# Patient Record
Sex: FEMALE | Race: WHITE | Employment: UNEMPLOYED | URBAN - METROPOLITAN AREA
[De-identification: names, ages, dates, MRNs, and addresses within clinical notes are randomized per-mention and may not be internally consistent; named-entity substitution may affect disease eponyms.]

---

## 2022-05-28 ENCOUNTER — HOSPITAL ENCOUNTER (INPATIENT)
Age: 72
LOS: 2 days | Discharge: HOME | DRG: 064 | End: 2022-05-30
Attending: EMERGENCY MEDICINE | Admitting: PSYCHIATRY & NEUROLOGY
Payer: MEDICARE

## 2022-05-28 ENCOUNTER — APPOINTMENT (OUTPATIENT)
Dept: MRI IMAGING | Age: 72
DRG: 064 | End: 2022-05-28
Payer: MEDICARE

## 2022-05-28 ENCOUNTER — APPOINTMENT (OUTPATIENT)
Dept: CT IMAGING | Age: 72
DRG: 064 | End: 2022-05-28
Payer: MEDICARE

## 2022-05-28 ENCOUNTER — APPOINTMENT (OUTPATIENT)
Dept: GENERAL RADIOLOGY | Age: 72
DRG: 064 | End: 2022-05-28
Payer: MEDICARE

## 2022-05-28 DIAGNOSIS — E86.0 DEHYDRATION: Primary | ICD-10-CM

## 2022-05-28 DIAGNOSIS — N17.9 AKI (ACUTE KIDNEY INJURY) (HCC): ICD-10-CM

## 2022-05-28 DIAGNOSIS — E11.22 CKD STAGE 3 DUE TO TYPE 2 DIABETES MELLITUS (HCC): ICD-10-CM

## 2022-05-28 DIAGNOSIS — N18.30 CKD STAGE 3 DUE TO TYPE 2 DIABETES MELLITUS (HCC): ICD-10-CM

## 2022-05-28 DIAGNOSIS — I63.9 CEREBROVASCULAR ACCIDENT (CVA), UNSPECIFIED MECHANISM (HCC): ICD-10-CM

## 2022-05-28 DIAGNOSIS — E11.65 TYPE 2 DIABETES MELLITUS WITH HYPERGLYCEMIA, WITH LONG-TERM CURRENT USE OF INSULIN (HCC): ICD-10-CM

## 2022-05-28 DIAGNOSIS — Z79.4 TYPE 2 DIABETES MELLITUS WITH HYPERGLYCEMIA, WITH LONG-TERM CURRENT USE OF INSULIN (HCC): ICD-10-CM

## 2022-05-28 PROBLEM — E78.2 MIXED HYPERLIPIDEMIA: Status: ACTIVE | Noted: 2022-05-28

## 2022-05-28 PROBLEM — K52.9 CHRONIC DIARRHEA: Status: ACTIVE | Noted: 2022-05-28

## 2022-05-28 PROBLEM — I10 PRIMARY HYPERTENSION: Status: ACTIVE | Noted: 2022-05-28

## 2022-05-28 PROBLEM — N30.00 ACUTE CYSTITIS: Status: ACTIVE | Noted: 2022-05-28

## 2022-05-28 PROBLEM — E03.9 ACQUIRED HYPOTHYROIDISM: Status: ACTIVE | Noted: 2022-05-28

## 2022-05-28 PROBLEM — E03.8 OTHER SPECIFIED HYPOTHYROIDISM: Status: ACTIVE | Noted: 2022-05-28

## 2022-05-28 LAB
-: ABNORMAL
ABSOLUTE EOS #: 0.28 K/UL (ref 0–0.44)
ABSOLUTE IMMATURE GRANULOCYTE: 0.05 K/UL (ref 0–0.3)
ABSOLUTE LYMPH #: 3.15 K/UL (ref 1.1–3.7)
ABSOLUTE MONO #: 0.7 K/UL (ref 0.1–1.2)
ALBUMIN SERPL-MCNC: 3.9 G/DL (ref 3.5–5.2)
ALBUMIN/GLOBULIN RATIO: 1.2 (ref 1–2.5)
ALP BLD-CCNC: 69 U/L (ref 35–104)
ALT SERPL-CCNC: 45 U/L (ref 5–33)
ANION GAP SERPL CALCULATED.3IONS-SCNC: 16 MMOL/L (ref 9–17)
ANION GAP: 10 MMOL/L (ref 7–16)
AST SERPL-CCNC: 42 U/L
BACTERIA: ABNORMAL
BASOPHILS # BLD: 1 % (ref 0–2)
BASOPHILS ABSOLUTE: 0.13 K/UL (ref 0–0.2)
BETA-HYDROXYBUTYRATE: 0.29 MMOL/L (ref 0.02–0.27)
BILIRUB SERPL-MCNC: 0.38 MG/DL (ref 0.3–1.2)
BILIRUBIN URINE: NEGATIVE
BUN BLDV-MCNC: 23 MG/DL (ref 8–23)
CALCIUM SERPL-MCNC: 9.9 MG/DL (ref 8.6–10.4)
CASTS UA: ABNORMAL /LPF (ref 0–8)
CHLORIDE BLD-SCNC: 93 MMOL/L (ref 98–107)
CHOLESTEROL/HDL RATIO: 5.1
CHOLESTEROL: 256 MG/DL
CHP ED QC CHECK: NORMAL
CO2: 22 MMOL/L (ref 20–31)
COLOR: YELLOW
CREAT SERPL-MCNC: 1.32 MG/DL (ref 0.5–0.9)
CREATININE URINE: 76 MG/DL (ref 28–217)
EOSINOPHILS RELATIVE PERCENT: 3 % (ref 1–4)
EPITHELIAL CELLS UA: ABNORMAL /HPF (ref 0–5)
FLU A ANTIGEN: NEGATIVE
FLU B ANTIGEN: NEGATIVE
GFR AFRICAN AMERICAN: 48 ML/MIN
GFR NON-AFRICAN AMERICAN: 33 ML/MIN
GFR NON-AFRICAN AMERICAN: 40 ML/MIN
GFR SERPL CREATININE-BSD FRML MDRD: 40 ML/MIN
GFR SERPL CREATININE-BSD FRML MDRD: ABNORMAL ML/MIN/{1.73_M2}
GFR SERPL CREATININE-BSD FRML MDRD: ABNORMAL ML/MIN/{1.73_M2}
GLUCOSE BLD-MCNC: 323 MG/DL
GLUCOSE BLD-MCNC: 323 MG/DL (ref 65–105)
GLUCOSE BLD-MCNC: 338 MG/DL (ref 65–105)
GLUCOSE BLD-MCNC: 345 MG/DL (ref 70–99)
GLUCOSE BLD-MCNC: 350 MG/DL (ref 74–100)
GLUCOSE URINE: ABNORMAL
HCO3 VENOUS: 27.7 MMOL/L (ref 22–29)
HCT VFR BLD CALC: 44.4 % (ref 36.3–47.1)
HDLC SERPL-MCNC: 50 MG/DL
HEMOGLOBIN: 15.1 G/DL (ref 11.9–15.1)
IMMATURE GRANULOCYTES: 1 %
INR BLD: 1
KETONES, URINE: NEGATIVE
LACTIC ACID, WHOLE BLOOD: 1.6 MMOL/L (ref 0.7–2.1)
LACTIC ACID, WHOLE BLOOD: 2.2 MMOL/L (ref 0.7–2.1)
LDL CHOLESTEROL: 145 MG/DL (ref 0–130)
LEUKOCYTE ESTERASE, URINE: ABNORMAL
LYMPHOCYTES # BLD: 29 % (ref 24–43)
MCH RBC QN AUTO: 29.8 PG (ref 25.2–33.5)
MCHC RBC AUTO-ENTMCNC: 34 G/DL (ref 28.4–34.8)
MCV RBC AUTO: 87.6 FL (ref 82.6–102.9)
MONOCYTES # BLD: 6 % (ref 3–12)
MYOGLOBIN: 84 NG/ML (ref 25–58)
NITRITE, URINE: NEGATIVE
NRBC AUTOMATED: 0 PER 100 WBC
O2 SAT, VEN: 61 % (ref 60–85)
PARTIAL THROMBOPLASTIN TIME: 22.7 SEC (ref 20.5–30.5)
PCO2, VEN: 49 MM HG (ref 41–51)
PDW BLD-RTO: 13.3 % (ref 11.8–14.4)
PH UA: 5.5 (ref 5–8)
PH VENOUS: 7.36 (ref 7.32–7.43)
PLATELET # BLD: 188 K/UL (ref 138–453)
PMV BLD AUTO: 10.3 FL (ref 8.1–13.5)
PO2, VEN: 33.5 MM HG (ref 30–50)
POC BUN: 25 MG/DL (ref 8–26)
POC CHLORIDE: 98 MMOL/L (ref 98–107)
POC CREATININE: 1.56 MG/DL (ref 0.51–1.19)
POC HEMATOCRIT: 46 % (ref 36–46)
POC HEMOGLOBIN: 15.6 G/DL (ref 12–16)
POC IONIZED CALCIUM: 1.23 MMOL/L (ref 1.15–1.33)
POC LACTIC ACID: 1.88 MMOL/L (ref 0.56–1.39)
POC POTASSIUM: 4.9 MMOL/L (ref 3.5–4.5)
POC SODIUM: 135 MMOL/L (ref 138–146)
POC TCO2: 28 MMOL/L (ref 22–30)
POSITIVE BASE EXCESS, VEN: 1 (ref 0–3)
POTASSIUM SERPL-SCNC: 4.5 MMOL/L (ref 3.7–5.3)
PRO-BNP: 902 PG/ML
PROTEIN UA: ABNORMAL
PROTHROMBIN TIME: 10.4 SEC (ref 9.1–12.3)
RBC # BLD: 5.07 M/UL (ref 3.95–5.11)
RBC UA: ABNORMAL /HPF (ref 0–4)
SEG NEUTROPHILS: 60 % (ref 36–65)
SEGMENTED NEUTROPHILS ABSOLUTE COUNT: 6.55 K/UL (ref 1.5–8.1)
SODIUM BLD-SCNC: 131 MMOL/L (ref 135–144)
SPECIFIC GRAVITY UA: 1.05 (ref 1–1.03)
THYROXINE, FREE: 0.89 NG/DL (ref 0.93–1.7)
TOTAL CK: 63 U/L (ref 26–192)
TOTAL PROTEIN, URINE: 46 MG/DL
TOTAL PROTEIN: 7.2 G/DL (ref 6.4–8.3)
TRIGL SERPL-MCNC: 305 MG/DL
TROPONIN, HIGH SENSITIVITY: 19 NG/L (ref 0–14)
TROPONIN, HIGH SENSITIVITY: 22 NG/L (ref 0–14)
TSH SERPL DL<=0.05 MIU/L-ACNC: 14.46 UIU/ML (ref 0.3–5)
TURBIDITY: CLEAR
URINE HGB: NEGATIVE
URINE TOTAL PROTEIN CREATININE RATIO: 0.61 (ref 0–0.2)
UROBILINOGEN, URINE: NORMAL
WBC # BLD: 10.9 K/UL (ref 3.5–11.3)
WBC UA: ABNORMAL /HPF (ref 0–5)

## 2022-05-28 PROCEDURE — 87088 URINE BACTERIA CULTURE: CPT

## 2022-05-28 PROCEDURE — 81001 URINALYSIS AUTO W/SCOPE: CPT

## 2022-05-28 PROCEDURE — 93005 ELECTROCARDIOGRAM TRACING: CPT | Performed by: STUDENT IN AN ORGANIZED HEALTH CARE EDUCATION/TRAINING PROGRAM

## 2022-05-28 PROCEDURE — 70498 CT ANGIOGRAPHY NECK: CPT

## 2022-05-28 PROCEDURE — 6370000000 HC RX 637 (ALT 250 FOR IP): Performed by: STUDENT IN AN ORGANIZED HEALTH CARE EDUCATION/TRAINING PROGRAM

## 2022-05-28 PROCEDURE — 87186 SC STD MICRODIL/AGAR DIL: CPT

## 2022-05-28 PROCEDURE — 85014 HEMATOCRIT: CPT

## 2022-05-28 PROCEDURE — 84484 ASSAY OF TROPONIN QUANT: CPT

## 2022-05-28 PROCEDURE — 99223 1ST HOSP IP/OBS HIGH 75: CPT | Performed by: PSYCHIATRY & NEUROLOGY

## 2022-05-28 PROCEDURE — 6360000004 HC RX CONTRAST MEDICATION: Performed by: STUDENT IN AN ORGANIZED HEALTH CARE EDUCATION/TRAINING PROGRAM

## 2022-05-28 PROCEDURE — 70450 CT HEAD/BRAIN W/O DYE: CPT

## 2022-05-28 PROCEDURE — 82550 ASSAY OF CK (CPK): CPT

## 2022-05-28 PROCEDURE — 2580000003 HC RX 258: Performed by: STUDENT IN AN ORGANIZED HEALTH CARE EDUCATION/TRAINING PROGRAM

## 2022-05-28 PROCEDURE — 80048 BASIC METABOLIC PNL TOTAL CA: CPT

## 2022-05-28 PROCEDURE — 1200000000 HC SEMI PRIVATE

## 2022-05-28 PROCEDURE — 2580000003 HC RX 258: Performed by: FAMILY MEDICINE

## 2022-05-28 PROCEDURE — 83874 ASSAY OF MYOGLOBIN: CPT

## 2022-05-28 PROCEDURE — 71045 X-RAY EXAM CHEST 1 VIEW: CPT

## 2022-05-28 PROCEDURE — 84439 ASSAY OF FREE THYROXINE: CPT

## 2022-05-28 PROCEDURE — 82010 KETONE BODYS QUAN: CPT

## 2022-05-28 PROCEDURE — 87086 URINE CULTURE/COLONY COUNT: CPT

## 2022-05-28 PROCEDURE — 87635 SARS-COV-2 COVID-19 AMP PRB: CPT

## 2022-05-28 PROCEDURE — 83880 ASSAY OF NATRIURETIC PEPTIDE: CPT

## 2022-05-28 PROCEDURE — 85025 COMPLETE CBC W/AUTO DIFF WBC: CPT

## 2022-05-28 PROCEDURE — 6370000000 HC RX 637 (ALT 250 FOR IP): Performed by: FAMILY MEDICINE

## 2022-05-28 PROCEDURE — 85730 THROMBOPLASTIN TIME PARTIAL: CPT

## 2022-05-28 PROCEDURE — 84520 ASSAY OF UREA NITROGEN: CPT

## 2022-05-28 PROCEDURE — 84156 ASSAY OF PROTEIN URINE: CPT

## 2022-05-28 PROCEDURE — 70551 MRI BRAIN STEM W/O DYE: CPT

## 2022-05-28 PROCEDURE — 82570 ASSAY OF URINE CREATININE: CPT

## 2022-05-28 PROCEDURE — 83605 ASSAY OF LACTIC ACID: CPT

## 2022-05-28 PROCEDURE — 85610 PROTHROMBIN TIME: CPT

## 2022-05-28 PROCEDURE — 82553 CREATINE MB FRACTION: CPT

## 2022-05-28 PROCEDURE — 82803 BLOOD GASES ANY COMBINATION: CPT

## 2022-05-28 PROCEDURE — 99285 EMERGENCY DEPT VISIT HI MDM: CPT

## 2022-05-28 PROCEDURE — 84443 ASSAY THYROID STIM HORMONE: CPT

## 2022-05-28 PROCEDURE — 82947 ASSAY GLUCOSE BLOOD QUANT: CPT

## 2022-05-28 PROCEDURE — 80051 ELECTROLYTE PANEL: CPT

## 2022-05-28 PROCEDURE — 82330 ASSAY OF CALCIUM: CPT

## 2022-05-28 PROCEDURE — 6360000002 HC RX W HCPCS: Performed by: STUDENT IN AN ORGANIZED HEALTH CARE EDUCATION/TRAINING PROGRAM

## 2022-05-28 PROCEDURE — 80061 LIPID PANEL: CPT

## 2022-05-28 PROCEDURE — 82565 ASSAY OF CREATININE: CPT

## 2022-05-28 PROCEDURE — 80053 COMPREHEN METABOLIC PANEL: CPT

## 2022-05-28 PROCEDURE — 83036 HEMOGLOBIN GLYCOSYLATED A1C: CPT

## 2022-05-28 PROCEDURE — 87804 INFLUENZA ASSAY W/OPTIC: CPT

## 2022-05-28 RX ORDER — LEVOTHYROXINE SODIUM 0.12 MG/1
TABLET ORAL
COMMUNITY
Start: 2021-03-21

## 2022-05-28 RX ORDER — CYCLOBENZAPRINE HCL 10 MG
TABLET ORAL NIGHTLY PRN
Status: ON HOLD | COMMUNITY
Start: 2022-03-15 | End: 2022-05-30 | Stop reason: HOSPADM

## 2022-05-28 RX ORDER — GLIMEPIRIDE 4 MG/1
TABLET ORAL 2 TIMES DAILY
COMMUNITY
Start: 2022-05-28

## 2022-05-28 RX ORDER — ONDANSETRON 2 MG/ML
4 INJECTION INTRAMUSCULAR; INTRAVENOUS EVERY 6 HOURS PRN
Status: DISCONTINUED | OUTPATIENT
Start: 2022-05-28 | End: 2022-05-30 | Stop reason: HOSPADM

## 2022-05-28 RX ORDER — GABAPENTIN 300 MG/1
CAPSULE ORAL NIGHTLY
COMMUNITY
Start: 2022-04-27

## 2022-05-28 RX ORDER — SITAGLIPTIN 50 MG/1
TABLET, FILM COATED ORAL
COMMUNITY
Start: 2022-03-15

## 2022-05-28 RX ORDER — CLOPIDOGREL 300 MG/1
300 TABLET, FILM COATED ORAL ONCE
Status: COMPLETED | OUTPATIENT
Start: 2022-05-28 | End: 2022-05-28

## 2022-05-28 RX ORDER — CLOPIDOGREL BISULFATE 75 MG/1
75 TABLET ORAL DAILY
Status: DISCONTINUED | OUTPATIENT
Start: 2022-05-29 | End: 2022-05-30 | Stop reason: HOSPADM

## 2022-05-28 RX ORDER — DEXTROSE MONOHYDRATE 50 MG/ML
100 INJECTION, SOLUTION INTRAVENOUS PRN
Status: DISCONTINUED | OUTPATIENT
Start: 2022-05-28 | End: 2022-05-30 | Stop reason: HOSPADM

## 2022-05-28 RX ORDER — PANTOPRAZOLE SODIUM 40 MG/1
TABLET, DELAYED RELEASE ORAL
COMMUNITY
Start: 2022-03-12

## 2022-05-28 RX ORDER — INSULIN DEGLUDEC INJECTION 100 U/ML
INJECTION, SOLUTION SUBCUTANEOUS
Status: ON HOLD | COMMUNITY
Start: 2021-08-17 | End: 2022-05-30 | Stop reason: HOSPADM

## 2022-05-28 RX ORDER — INSULIN LISPRO 100 [IU]/ML
0-12 INJECTION, SOLUTION INTRAVENOUS; SUBCUTANEOUS
Status: DISCONTINUED | OUTPATIENT
Start: 2022-05-29 | End: 2022-05-30 | Stop reason: HOSPADM

## 2022-05-28 RX ORDER — 0.9 % SODIUM CHLORIDE 0.9 %
1000 INTRAVENOUS SOLUTION INTRAVENOUS ONCE
Status: COMPLETED | OUTPATIENT
Start: 2022-05-28 | End: 2022-05-28

## 2022-05-28 RX ORDER — LOPERAMIDE HYDROCHLORIDE 2 MG/1
2 CAPSULE ORAL 3 TIMES DAILY PRN
Status: DISCONTINUED | OUTPATIENT
Start: 2022-05-28 | End: 2022-05-30 | Stop reason: HOSPADM

## 2022-05-28 RX ORDER — PANTOPRAZOLE SODIUM 40 MG/1
40 TABLET, DELAYED RELEASE ORAL DAILY
Status: DISCONTINUED | OUTPATIENT
Start: 2022-05-29 | End: 2022-05-30 | Stop reason: HOSPADM

## 2022-05-28 RX ORDER — ROSUVASTATIN CALCIUM 20 MG/1
40 TABLET, COATED ORAL NIGHTLY
Status: DISCONTINUED | OUTPATIENT
Start: 2022-05-29 | End: 2022-05-30 | Stop reason: HOSPADM

## 2022-05-28 RX ORDER — INSULIN GLARGINE 100 [IU]/ML
10 INJECTION, SOLUTION SUBCUTANEOUS NIGHTLY
Status: DISCONTINUED | OUTPATIENT
Start: 2022-05-28 | End: 2022-05-29

## 2022-05-28 RX ORDER — SODIUM CHLORIDE 0.9 % (FLUSH) 0.9 %
10 SYRINGE (ML) INJECTION PRN
Status: DISCONTINUED | OUTPATIENT
Start: 2022-05-28 | End: 2022-05-30 | Stop reason: HOSPADM

## 2022-05-28 RX ORDER — ROSUVASTATIN CALCIUM 20 MG/1
40 TABLET, COATED ORAL ONCE
Status: COMPLETED | OUTPATIENT
Start: 2022-05-28 | End: 2022-05-28

## 2022-05-28 RX ORDER — INSULIN LISPRO 100 [IU]/ML
5 INJECTION, SOLUTION INTRAVENOUS; SUBCUTANEOUS ONCE
Status: COMPLETED | OUTPATIENT
Start: 2022-05-28 | End: 2022-05-28

## 2022-05-28 RX ORDER — ONDANSETRON 4 MG/1
4 TABLET, ORALLY DISINTEGRATING ORAL EVERY 8 HOURS PRN
Status: DISCONTINUED | OUTPATIENT
Start: 2022-05-28 | End: 2022-05-30 | Stop reason: HOSPADM

## 2022-05-28 RX ORDER — IBUPROFEN 800 MG/1
TABLET ORAL
Status: ON HOLD | COMMUNITY
Start: 2022-03-15 | End: 2022-05-30 | Stop reason: HOSPADM

## 2022-05-28 RX ORDER — ENOXAPARIN SODIUM 100 MG/ML
40 INJECTION SUBCUTANEOUS DAILY
Status: DISCONTINUED | OUTPATIENT
Start: 2022-05-29 | End: 2022-05-30 | Stop reason: HOSPADM

## 2022-05-28 RX ORDER — LEVOTHYROXINE SODIUM 0.12 MG/1
125 TABLET ORAL DAILY
Status: DISCONTINUED | OUTPATIENT
Start: 2022-05-29 | End: 2022-05-29

## 2022-05-28 RX ORDER — LOPERAMIDE HYDROCHLORIDE 2 MG/1
2 TABLET ORAL 2 TIMES DAILY PRN
COMMUNITY

## 2022-05-28 RX ORDER — INSULIN LISPRO 100 [IU]/ML
0-3 INJECTION, SOLUTION INTRAVENOUS; SUBCUTANEOUS NIGHTLY
Status: CANCELLED | OUTPATIENT
Start: 2022-05-28

## 2022-05-28 RX ORDER — INSULIN LISPRO 100 [IU]/ML
0-6 INJECTION, SOLUTION INTRAVENOUS; SUBCUTANEOUS
Status: CANCELLED | OUTPATIENT
Start: 2022-05-28

## 2022-05-28 RX ORDER — CARVEDILOL 25 MG/1
TABLET ORAL 2 TIMES DAILY WITH MEALS
COMMUNITY
Start: 2022-03-15

## 2022-05-28 RX ORDER — SERTRALINE HYDROCHLORIDE 100 MG/1
TABLET, FILM COATED ORAL
COMMUNITY
Start: 2022-03-15

## 2022-05-28 RX ORDER — ATORVASTATIN CALCIUM 40 MG/1
40 TABLET, FILM COATED ORAL NIGHTLY
Status: DISCONTINUED | OUTPATIENT
Start: 2022-05-28 | End: 2022-05-28

## 2022-05-28 RX ORDER — SODIUM CHLORIDE 9 MG/ML
INJECTION, SOLUTION INTRAVENOUS PRN
Status: DISCONTINUED | OUTPATIENT
Start: 2022-05-28 | End: 2022-05-30 | Stop reason: HOSPADM

## 2022-05-28 RX ORDER — LISINOPRIL 10 MG/1
TABLET ORAL
Status: ON HOLD | COMMUNITY
Start: 2022-05-18 | End: 2022-05-30 | Stop reason: HOSPADM

## 2022-05-28 RX ORDER — INSULIN LISPRO 100 [IU]/ML
0-6 INJECTION, SOLUTION INTRAVENOUS; SUBCUTANEOUS NIGHTLY
Status: DISCONTINUED | OUTPATIENT
Start: 2022-05-28 | End: 2022-05-30 | Stop reason: HOSPADM

## 2022-05-28 RX ORDER — SODIUM CHLORIDE 9 MG/ML
INJECTION, SOLUTION INTRAVENOUS CONTINUOUS
Status: DISCONTINUED | OUTPATIENT
Start: 2022-05-28 | End: 2022-05-30

## 2022-05-28 RX ORDER — SODIUM CHLORIDE 0.9 % (FLUSH) 0.9 %
10 SYRINGE (ML) INJECTION EVERY 12 HOURS SCHEDULED
Status: DISCONTINUED | OUTPATIENT
Start: 2022-05-28 | End: 2022-05-30 | Stop reason: HOSPADM

## 2022-05-28 RX ADMIN — INSULIN LISPRO 5 UNITS: 100 INJECTION, SOLUTION INTRAVENOUS; SUBCUTANEOUS at 19:17

## 2022-05-28 RX ADMIN — INSULIN GLARGINE 10 UNITS: 100 INJECTION, SOLUTION SUBCUTANEOUS at 23:45

## 2022-05-28 RX ADMIN — ASPIRIN 325 MG: 325 TABLET, DELAYED RELEASE ORAL at 18:23

## 2022-05-28 RX ADMIN — ROSUVASTATIN CALCIUM 40 MG: 20 TABLET, FILM COATED ORAL at 18:21

## 2022-05-28 RX ADMIN — INSULIN LISPRO 2 UNITS: 100 INJECTION, SOLUTION INTRAVENOUS; SUBCUTANEOUS at 23:45

## 2022-05-28 RX ADMIN — CEFTRIAXONE SODIUM 1000 MG: 1 INJECTION, POWDER, FOR SOLUTION INTRAMUSCULAR; INTRAVENOUS at 19:46

## 2022-05-28 RX ADMIN — CLOPIDOGREL BISULFATE 300 MG: 300 TABLET, FILM COATED ORAL at 18:21

## 2022-05-28 RX ADMIN — SODIUM CHLORIDE, PRESERVATIVE FREE 10 ML: 5 INJECTION INTRAVENOUS at 22:32

## 2022-05-28 RX ADMIN — SODIUM CHLORIDE 1000 ML: 9 INJECTION, SOLUTION INTRAVENOUS at 15:40

## 2022-05-28 RX ADMIN — SODIUM CHLORIDE: 9 INJECTION, SOLUTION INTRAVENOUS at 22:32

## 2022-05-28 RX ADMIN — IOPAMIDOL 90 ML: 755 INJECTION, SOLUTION INTRAVENOUS at 15:39

## 2022-05-28 ASSESSMENT — ENCOUNTER SYMPTOMS
SHORTNESS OF BREATH: 0
DIARRHEA: 1
PHOTOPHOBIA: 0

## 2022-05-28 NOTE — CONSULTS
[address]     Department of Internal Medicine - Staff Internal Medicine Teaching Service          ADMISSION NOTE/HISTORY AND PHYSICAL EXAMINATION   Date: 5/28/2022  Patient Name: Handy Ruiz  Date of admission: 5/28/2022  3:09 PM  YOB: 1950  PCP: Kathie Salas MD  History Obtained From:  patient, electronic medical record    Consult Reason:     Consult Reason: slurred speech    HISTORY OF PRESENTING ILLNESS     The patient is a pleasant 81 y.o. female presents with a chief complaint of slurred speech. 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 Patient was at ED visiting relative, family noticed slurred speech and left sided leaning. No loss of consciousness, fall, incontinence. At ED, pt alert and oriented x 3, on 3L NC, /73, but as high as 198/100, pulse 80, NIH stroke scale 1 for minor facial palsy,   Significant labs: Cr 1.32, glycemia 380, lft unremarkable, b-hyfdroxy 0.29, cbc normal, INR 1, VBG normal   Imaging revealed  Partial occlusion in the proximal M2 segment of the right MCA. 90% stenosis in the mid M1 segment of the left MCA. 80% stenosis in the proximal P2 segment of the right PCA. 80% stenosis in the mid P2 segment of the left PCA. 50% stenosis in the mid V2 segment of the left vertebral artery. MRI brain Subtle punctate foci of acute infarction involving the left periatrial white matter and left inferior parietal lobule subcortical white matter. Old lacunar infarction right caudate head. Mild chronic microangiopathic ischemic disease. Mild generalized volume loss  Patient started on IVF, aspirin, plavix, rosuvastatin.  Neurology on board      Review of Systems:  General ROS: Completed and except as mentioned above were negative   HEENT ROS: Completed and except as mentioned above were negative   Allergy and Immunology ROS:  Completed and except as mentioned above were negative  Hematological and Lymphatic ROS:  Completed and except as mentioned above were negative  Respiratory ROS:  Completed and except as mentioned above were negative  Cardiovascular ROS:  Completed and except as mentioned above were negative  Gastrointestinal ROS: Completed and except as mentioned above were negative  Genito-Urinary ROS:  Completed and except as mentioned above were negative  Musculoskeletal ROS:  Completed and except as mentioned above were negative  Neurological ROS:  Completed and except as mentioned above were negative  Skin & Dermatological ROS:  Completed and except as mentioned above were negative  Psychological ROS:  Completed and except as mentioned above were negative    PAST MEDICAL HISTORY     Past Medical History:   Diagnosis Date    Depression     Diabetes mellitus ([facility] [address]     Hypertension     Tremors of nervous system 2020    being evaluated for parkinsons       PAST SURGICAL HISTORY     Past Surgical History:   Procedure Laterality Date    CHOLECYSTECTOMY      HYSTERECTOMY         ALLERGIES     Morphine, Metformin, Sulfa antibiotics, and Adhesive tape    MEDICATIONS PRIOR TO ADMISSION     Prior to Admission medications    Medication Sig Start Date End Date Taking?  Authorizing Provider   pantoprazole (PROTONIX) 40 MG tablet TAKE ONE TABLET BY MOUTH DAILY 3/12/22  Yes Historical Provider, MD   levothyroxine (SYNTHROID) 125 MCG tablet TAKE ONE TABLET BY MOUTH DAILY 3/21/21  Yes Historical Provider, MD   Insulin Degludec (TRESIBA FLEXTOUCH) 100 UNIT/ML SOPN 10 units qhs 8/17/21  Yes Historical Provider, MD   gabapentin (NEURONTIN) 300 MG capsule  4/27/22   Historical Provider, MD   glimepiride (AMARYL) 4 MG tablet  5/28/22   Historical Provider, MD   lisinopril (PRINIVIL;ZESTRIL) 10 MG tablet  5/18/22   Historical Provider, MD XIEUVIA 50 MG tablet 3/15/22   Historical Provider, MD   carvedilol (COREG) 25 MG tablet  3/15/22   Historical Provider, MD   sertraline (ZOLOFT) 100 MG tablet  3/15/22   Historical Provider, MD   ibuprofen (ADVIL;MOTRIN) 800 MG tablet  3/15/22   Historical Provider, MD   cyclobenzaprine (FLEXERIL) 10 mg tablet  3/15/22   Historical Provider, MD       SOCIAL HISTORY     Tobacco: denies  Alcohol: denies  Illicits: denies  Occupation:     FAMILY HISTORY     No family history on file. PHYSICAL EXAM     Vitals: BP (!) 198/100   Pulse 77   Temp 98.1 °F (36.7 °C) (Oral)   Resp 18   Ht 5' 2\" (1.575 m)   Wt 210 lb (95.3 kg)   SpO2 90%   BMI 38.41 kg/m²   Tmax: Temp (24hrs), Av.1 °F (36.7 °C), Min:98.1 °F (36.7 °C), Max:98.1 °F (36.7 °C)    Last Body weight:   Wt Readings from Last 3 Encounters:   22 210 lb (95.3 kg)     Body Mass Index : Body mass index is 38.41 kg/m². PHYSICAL EXAMINATION:  Constitutional: This is a well developed, well nourished, 35-39.9 - Obesity Grade II 81y.o. year old female who is alert, oriented, cooperative and in no apparent distress. Head:Left facial palsy. normocephalic and atraumatic. EENT:  PERRLA. No conjunctival injections. Septum was midline, mucosa was without erythema, exudates or cobblestoning. No thrush was noted. Neck: Supple without thyromegaly. No elevated JVP. Trachea was midline. Respiratory: Chest was symmetrical without dullness to percussion. Breath sounds bilaterally were clear to auscultation. There were no wheezes, rhonchi or rales. There is no intercostal retraction or use of accessory muscles. No egophony noted. Cardiovascular: Regular without murmur, clicks, gallops or rubs. Abdomen: Slightly rounded and soft without organomegaly. No rebound, rigidity or guarding was appreciated. Lymphatic: No lymphadenopathy. Musculoskeletal: Normal curvature of the spine. No gross muscle weakness.     Extremities:  No lower extremity edema, ulcerations, Time: 05/28/22  3:26 PM   Result Value Ref Range    POC Glucose 350 (H) 74 - 100 mg/dL   CBC with Auto Differential    Collection Time: 05/28/22  3:30 PM   Result Value Ref Range    WBC 10.9 3.5 - 11.3 k/uL    RBC 5.07 3.95 - 5.11 m/uL    Hemoglobin 15.1 11.9 - 15.1 g/dL    Hematocrit 44.4 36.3 - 47.1 %    MCV 87.6 82.6 - 102.9 fL    MCH 29.8 25.2 - 33.5 pg    MCHC 34.0 28.4 - 34.8 g/dL    RDW 13.3 11.8 - 14.4 %    Platelets 086 648 - 429 k/uL    MPV 10.3 8.1 - 13.5 fL    NRBC Automated 0.0 0.0 per 100 WBC    Seg Neutrophils 60 36 - 65 %    Lymphocytes 29 24 - 43 %    Monocytes 6 3 - 12 %    Eosinophils % 3 1 - 4 %    Basophils 1 0 - 2 %    Immature Granulocytes 1 (H) 0 %    Segs Absolute 6.55 1.50 - 8.10 k/uL    Absolute Lymph # 3.15 1.10 - 3.70 k/uL    Absolute Mono # 0.70 0.10 - 1.20 k/uL    Absolute Eos # 0.28 0.00 - 0.44 k/uL    Basophils Absolute 0.13 0.00 - 0.20 k/uL    Absolute Immature Granulocyte 0.05 0.00 - 0.30 k/uL   Comprehensive Metabolic Panel    Collection Time: 05/28/22  3:30 PM   Result Value Ref Range    Glucose 345 (H) 70 - 99 mg/dL    BUN 23 8 - 23 mg/dL    CREATININE 1.32 (H) 0.50 - 0.90 mg/dL    Calcium 9.9 8.6 - 10.4 mg/dL    Sodium 131 (L) 135 - 144 mmol/L    Potassium 4.5 3.7 - 5.3 mmol/L    Chloride 93 (L) 98 - 107 mmol/L    CO2 22 20 - 31 mmol/L    Anion Gap 16 9 - 17 mmol/L    Alkaline Phosphatase 69 35 - 104 U/L    ALT 45 (H) 5 - 33 U/L    AST 42 (H) <32 U/L    Total Bilirubin 0.38 0.3 - 1.2 mg/dL    Total Protein 7.2 6.4 - 8.3 g/dL    Albumin 3.9 3.5 - 5.2 g/dL    Albumin/Globulin Ratio 1.2 1.0 - 2.5    GFR Non-African American 40 (L) >60 mL/min    GFR  48 (L) >60 mL/min    GFR Comment         Lactic Acid    Collection Time: 05/28/22  3:30 PM   Result Value Ref Range    Lactic Acid, Whole Blood 2.2 (H) 0.7 - 2.1 mmol/L   Troponin    Collection Time: 05/28/22  3:30 PM   Result Value Ref Range    Troponin, High Sensitivity 22 (H) 0 - 14 ng/L   Brain Natriuretic Peptide Collection Time: 05/28/22  3:30 PM   Result Value Ref Range    Pro- (H) <300 pg/mL   Beta-Hydroxybutyrate    Collection Time: 05/28/22  3:30 PM   Result Value Ref Range    Beta-Hydroxybutyrate 0.29 (H) 0.02 - 0.27 mmol/L   CK    Collection Time: 05/28/22  3:30 PM   Result Value Ref Range    Total CK 63 26 - 192 U/L   Myoglobin, Serum    Collection Time: 05/28/22  3:30 PM   Result Value Ref Range    Myoglobin 84 (H) 25 - 58 ng/mL   Protime-INR    Collection Time: 05/28/22  3:30 PM   Result Value Ref Range    Protime 10.4 9.1 - 12.3 sec    INR 1.0    APTT    Collection Time: 05/28/22  3:30 PM   Result Value Ref Range    PTT 22.7 20.5 - 30.5 sec   Lipid Panel    Collection Time: 05/28/22  3:30 PM   Result Value Ref Range    Cholesterol 256 (H) <200 mg/dL    HDL 50 >40 mg/dL    LDL Cholesterol 145 (H) 0 - 130 mg/dL    Chol/HDL Ratio 5.1 (H) <5    Triglycerides 305 (H) <150 mg/dL   Troponin    Collection Time: 05/28/22  5:37 PM   Result Value Ref Range    Troponin, High Sensitivity 19 (H) 0 - 14 ng/L   RAPID INFLUENZA A/B ANTIGENS    Collection Time: 05/28/22  5:46 PM    Specimen: Nasopharyngeal   Result Value Ref Range    Flu A Antigen NEGATIVE NEGATIVE    Flu B Antigen NEGATIVE NEGATIVE   Urinalysis with Reflex to Culture    Collection Time: 05/28/22  6:04 PM    Specimen: Urine   Result Value Ref Range    Color, UA Yellow Yellow    Turbidity UA Clear Clear    Glucose, Ur 1+ (A) NEGATIVE    Bilirubin Urine NEGATIVE NEGATIVE    Ketones, Urine NEGATIVE NEGATIVE    Specific Gravity, UA 1.049 (H) 1.005 - 1.030    Urine Hgb NEGATIVE NEGATIVE    pH, UA 5.5 5.0 - 8.0    Protein, UA 1+ (A) NEGATIVE    Urobilinogen, Urine Normal Normal    Nitrite, Urine NEGATIVE NEGATIVE    Leukocyte Esterase, Urine SMALL (A) NEGATIVE   Microscopic Urinalysis    Collection Time: 05/28/22  6:04 PM   Result Value Ref Range    -          WBC, UA 20 TO 50 0 - 5 /HPF    RBC, UA 0 TO 2 0 - 4 /HPF    Casts UA  0 - 8 /LPF     2 TO 5 HYALINE Reference range defined for non-centrifuged specimen. Epithelial Cells UA 0 TO 2 0 - 5 /HPF    Bacteria, UA MANY (A) None   POC Glucose Fingerstick    Collection Time: 05/28/22  6:46 PM   Result Value Ref Range    POC Glucose 323 (H) 65 - 105 mg/dL   POCT Glucose    Collection Time: 05/28/22  6:54 PM   Result Value Ref Range    Glucose 323 mg/dL    QC OK? y        Imaging:   CT HEAD WO CONTRAST    Result Date: 5/28/2022  No acute intracranial abnormality. RECOMMENDATIONS: Unavailable     CTA HEAD NECK W CONTRAST    Result Date: 5/28/2022  Partial occlusion in the proximal M2 segment of the right MCA. 90% stenosis in the mid M1 segment of the left MCA. 80% stenosis in the proximal P2 segment of the right PCA. 80% stenosis in the mid P2 segment of the left PCA. 50% stenosis in the mid V2 segment of the left vertebral artery. RECOMMENDATIONS: Unavailable       ASSESSMENT & PLAN     ASSESSMENT / PLAN:     Thank you for allowing us to see Deepak Carmichael in consultation for medical management of her diabetes and other medical comorbidities. Principal Problem:    Ischemic stroke [facility])   Neurology on board. not a candidate for mechanical thrombectomy      Primary hypertension  - on coreg, lisinopril at home. Permissive HTN BP<200      Mixed hyperlipidemia  Started on dual antiplatelet  Aspirin and plavix. Start on Lipitor 40mg  Daily. Type 2 diabetes mellitus with hyperglycemia, with long-term current use of insulin (HCC)  Lantus 10 nightly. MDSS      Acquired hypothyroidism  Resume synthroid 125mcg daily      GERD: resume protonix 40mg daily      CKD stage 3 due to type 2 diabetes mellitus ([address] baseline Cr 1.1-1.3      Ashlyn Drew MD  Internal Medicine Resident, PGY-1  [facility];  [place], [place]  5/28/2022, 7:26 PM

## 2022-05-28 NOTE — ED PROVIDER NOTES
Signed out by Dr. Leeroy Ortega. Presenting with dysarthria, ?left sided weakness which began at approximately 1415 today. Stroke alert declared,w/u in progress, neurologist involved. Dispo TBD-anticipate admission.      Katt Novoa MD  05/28/22 [id number]

## 2022-05-28 NOTE — CARE COORDINATION
Case Management Initial Discharge Plan  Theone Estevan,             Met with:patient to discuss discharge plans. Information verified: address, contacts, phone number, , insurance Yes  Insurance Provider: [address] [place] [address] Blvd: No    Emergency Contact/Next of Kin name & number: Alan Boyce and Catherine Wall children  Who are involved in patient's support system? family    PCP: John Gallardo MD  Date of last visit: 2 months ago      Discharge Planning    Living Arrangements:    lives alone    Home has 1 stories  no stairs to climb to get into front door, no stairs to climb to reach second floor  Location of bedroom/bathroom in home main    Patient able to perform ADL's:Independent    Current Services (outpatient & in home) DME  DME equipment: walker, grab bars, glucometer  DME provider: na    Is patient receiving oral anticoagulation therapy? No    If indicated:   Physician managing anticoagulation treatment: na  Where does patient obtain lab work for ATC treatment? na    Does patient have any issues/concerns obtaining medications? No  If yes, what are patient's concerns? Is there a preferred Pharmacy after hours or on weekends? Yes    If yes, which pharmacy?  [facility]    Potential Assistance Needed:       Patient agreeable to home care: No  Tatamy of choice provided:  n/a    Prior SNF/Rehab Placement and Facility: none  Agreeable to SNF/Rehab: No  Tatamy of choice provided: n/a     Evaluation: no    Expected Discharge date:       Patient expects to be discharged to:    home  If home: is the family and/or caregiver wiling & able to provide support at home? yes  Who will be providing this support? family*    Follow Up Appointment: Best Day/ Time:      Transportation provider: daughter  Transportation arrangements needed for discharge: No    Readmission Risk              Risk of Unplanned Readmission:  0             Does patient have a readmission risk score greater than 14?: No  If yes, follow-up appointment must be made within 7 days of discharge.      Goals of Care: self care      Educated pt on transitional options, provided freedom of choice and are agreeable with plan      Discharge Plan: monitor pt/ot notes for ptential PMR needs, goal is home, has transportation          Electronically signed by Gibson Ling RN on 5/28/22 at 6:45 PM EDT

## 2022-05-28 NOTE — FLOWSHEET NOTE
[address] [facility] [address]  PROGRESS NOTE    Shift date: 5/28/2022  Shift day: Saturday   Shift # 2    Room # 04/04   Name: Trish Roberts                Judaism: [address] [place] [address] of Bahai:       Referral: Routine Visit    Admit Date & Time: 5/28/2022  3:09 PM    Assessment:  Trish Roberts is a 81 y.o. female in the hospital due to Stroke Alert. Upon entering the room writer observes patient is calm and visiting with her two daughters. Intervention:  Writer introduced himself to the patient and her family and offered space to express feelings, needs, and concerns and provided a ministry presence. Patient denies any spiritual\emotional distress at this time. Outcome:  Upon concluding the visit patient appeared calm and expressed gratitude for the spiritual\emotional care provided.     Plan:  Chaplains will remain available to offer spiritual and emotional support as needed      Electronically signed by Miguel Walls MDiv.on 5/28/2022 at [facility]  [phone]

## 2022-05-28 NOTE — H&P
[facility]  Department of Neurology  Resident H & P        History Obtained From:  patient, family member - daughter and sister in-law at bedside    CHIEF COMPLAINT:       Slurred speech and lean to left    HISTORY OF PRESENT ILLNESS:       The patient is a 81 y.o. female with PMH of HTN, DM, HLP, Hypothyroid, prior left sided bells palsy who presents with complaint of slurred speech and left sided weakness. Patient was in the waiting room of the ER visiting her mother who was here when her daughter and sister in-law noted her to have slurred speech and lean towards her left along with a left droop more pronounced. Patient was brought in to ER and blood glucose was checked and was 380s    Last know well: 1415    On presentation:  BP: 150/73  BSL: 380    Prior to arrival patient was on  Antiplatelets/anticoagulants: no  Statins: unsure if presently, but reportedly on atorvastatin previously      Smoking history: non-smoker       PAST MEDICAL HISTORY :       Past Medical History:    No past medical history on file. Past Surgical History:    No past surgical history on file.     Social History:   Social History     Socioeconomic History    Marital status: Not on file     Spouse name: Not on file    Number of children: Not on file    Years of education: Not on file    Highest education level: Not on file   Occupational History    Not on file   Tobacco Use    Smoking status: Not on file    Smokeless tobacco: Not on file   Substance and Sexual Activity    Alcohol use: Not on file    Drug use: Not on file    Sexual activity: Not on file   Other Topics Concern    Not on file   Social History Narrative    Not on file     Social Determinants of Health     Financial Resource Strain:     Difficulty of Paying Living Expenses: Not on file   Food Insecurity:     Worried About Running Out of Food in the Last Year: Not on file    Bandar of Food in the Last Year: Not on file   Transportation Needs:     Lack of Transportation (Medical): Not on file    Lack of Transportation (Non-Medical): Not on file   Physical Activity:     Days of Exercise per Week: Not on file    Minutes of Exercise per Session: Not on file   Stress:     Feeling of Stress : Not on file   Social Connections:     Frequency of Communication with Friends and Family: Not on file    Frequency of Social Gatherings with Friends and Family: Not on file    Attends Cheondoism Services: Not on file    Active Member of [address] [place] [address] or Organizations: Not on file    Attends Club or Organization Meetings: Not on file    Marital Status: Not on file   Intimate Partner Violence:     Fear of Current or Ex-Partner: Not on file    Emotionally Abused: Not on file    Physically Abused: Not on file    Sexually Abused: Not on file   Housing Stability:     Unable to Pay for Housing in the Last Year: Not on file    Number of [place] in the Last Year: Not on file    Unstable Housing in the Last Year: Not on file       Family History:   No family history on file. Allergies:  Patient has no known allergies. Home Medications:  Prior to Admission medications    Not on File       Current Medications:   Current Facility-Administered Medications: 0.9 % sodium chloride bolus, 1,000 mL, IntraVENous, Once    REVIEW OF SYSTEMS:       CONSTITUTIONAL: negative for fatigue and malaise   EYES: negative for double vision and photophobia    HEENT: negative for tinnitus and sore throat   RESPIRATORY: negative for cough, shortness of breath   CARDIOVASCULAR: negative for chest pain, palpitations   GASTROINTESTINAL: negative for nausea, vomiting   GENITOURINARY: negative for incontinence   MUSCULOSKELETAL: negative for neck or back pain   NEUROLOGICAL: negative for seizures   PSYCHIATRIC: negative for fatigue     Review of systems otherwise negative.     PHYSICAL EXAM:       BP (!) 150/73   Pulse 80   Temp 98.1 °F (36.7 °C) (Oral)   Resp 16   Ht 5' 2\" (1.575 m)   Wt 210 lb (95.3 kg)   SpO2 95%   BMI 38.41 kg/m²     CONSTITUTIONAL:  Well developed, well nourished, alert and oriented x 3, in no acute distress. GCS 15, nontoxic. No dysarthria, no aphasia. HEAD:  normocephalic, atraumatic    EYES:  PERRLA, EOMI.   ENT:  moist mucous membranes   NECK:  supple, symmetric, no midline tenderness to palpation    BACK:  without midline tenderness, step-offs or deformities    LUNGS:  Equal air entry bilaterally   CARDIOVASCULAR:  normal s1 / s2   ABDOMEN:  Soft, no rigidity   NEUROLOGIC:  Mental Status:  A & O x3,awake             Cranial Nerves:    cranial nerves II-XII are grossly intact except for mild left facial droop    Motor Exam:    Drift:  absent  Tone:  normal    Motor exam is symmetrical 5 out of 5 all extremities bilaterally    Sensory:    Touch:    Right Upper Extremity:  normal  Left Upper Extremity:  normal  Right Lower Extremity:  normal  Left Lower Extremity:  normal    Deep Tendon Reflexes:    Right Bicep:  2+  Left Bicep:  2+  Right Knee:  2+  Left Knee:  2+    Plantar Response:  Right:  downgoing  Left:  downgoing    Clonus:  N/A  Manuel's:  N/A    Coordination/Dysmetria:  Heel to Shin:  Right:  normal  Left:  normal  Finger to Nose:   Right:  normal  Left:  normal   Dysdiadochokinesia:  N/A    Gait:  Not tested due to condition    INITIAL NIH STROKE SCALE:    Time Performed:  340 PM     1a. Level of consciousness:  0 - alert; keenly responsive  1b. Level of consciousness questions:  0 - answers both questions correctly  1c. Level of consciousness questions:  0 - performs both tasks correctly  2. Best Gaze:  0 - normal  3. Visual:  0 - no visual loss  4. Facial Palsy:  1 - minor paralysis (flattened nasolabial fold, asymmetric on smiling)  5a. Motor left arm:  0 - no drift, limb holds 90 (or 45) degrees for full 10 seconds  5b. Motor right arm:  0 - no drift, limb holds 90 (or 45) degrees for full 10 seconds  6a.   Motor left le - no drift; leg holds 30 degree position for full 5 seconds  6b. Motor right le - no drift; leg holds 30 degree position for full 5 seconds  7. Limb Ataxia:  0 - absent  8. Sensory:  0 - normal; no sensory loss  9. Best Language:  0 - no aphasia, normal  10. Dysarthria:  0 - normal  11.   Extinction and Inattention:  0 - no abnormality    TOTAL:  1     SKIN:  no rash      Modified Putney Score Scale:     [] Zero: No symptoms at all   [x] 1: No significant disability despite symptoms; able to carry out all usual duties and activities   [] 2: Slight disability; unable to carry out all previous activities, but able to look after own affairs without assistance   [] 3:Moderate disability; requiring some help, but able to walk without assistance   [] 4: Moderately severe disability; unable to walk and attend to bodily needs without assistance   [] 5:Severe disability; bedridden, incontinent and requiring constant nursing care and attention       LABS AND IMAGING:     CBC with Differential:  No results found for: WBC, RBC, HGB, HCT, PLT, MCV, MCH, MCHC, RDW, NRBC, SEGSPCT, BANDSPCT, BLASTSPCT, METASPCT, LYMPHOPCT, PROMYELOPCT, MONOPCT, MYELOPCT, EOSPCT, BASOPCT, MONOSABS, LYMPHSABS, EOSABS, BASOSABS, DIFFTYPE      BMP:  No results found for: NA, K, CL, CO2, BUN, LABALBU, CREATININE, CALCIUM, GFRAA, LABGLOM, GLUCOSE, GLU    Radiology Review:    1.) CT brain without contrast:  Impression   No acute intracranial abnormality.       RECOMMENDATIONS:   Unavailable     2.) CTA Head/Neck:   Impression   Partial occlusion in the proximal M2 segment of the right MCA.       90% stenosis in the mid M1 segment of the left MCA.       80% stenosis in the proximal P2 segment of the right PCA.       80% stenosis in the mid P2 segment of the left PCA.       50% stenosis in the mid V2 segment of the left vertebral artery.       RECOMMENDATIONS:   Unavailable     3.) Brain MRI W/O:   IMPRESSION   Subtle punctate foci of acute infarction involving the left periatrial white   matter and left inferior parietal lobule subcortical white matter. .       Old lacunar infarction right caudate head.       There is no acute intracranial hemorrhage, or intracranial mass lesion.       Mild chronic microangiopathic ischemic disease.       Mild generalized volume loss.       RECOMMENDATIONS:   Unavailable       ASSESSMENT AND PLAN:       Patient Active Problem List   Diagnosis    Primary hypertension    Mixed hyperlipidemia    Type 2 diabetes mellitus with hyperglycemia, with long-term current use of insulin (HCC)    Ischemic stroke (HCC)    Other specified hypothyroidism    ALPHONSE (acute kidney injury) ([facility]       Assessment                 81 y.o. female with PMH of HTN, DM, HLP, Hypothyroid, prior left sided bells palsy who presents with complaint of slurred speech and left sided weakness. CTA with diffuse intracranial athero. Would recommend DAPT for 90 days as per [facility]  MRI positive for stroke  Sx likely secondary to dehydration  Also appears to have ALPHONSE    Last Known Well (date and time): 5/28/2022 @ 8546    2. Candidate for IV tPA therapy     Yes []     No  [x] due to the following exclusion criteria: sx improved, NIH 1    3.  Candidate for Thrombectomy    Yes []      No [x] due to the following exclusion criteria: no LVO    - Discussed with Dr. Alisa Lim     Recommendations:    [x] General Neurology Care Status - prefer 5th floor (5A/5C)   [] Internal Medicine General Care Status   [] NICU Status - (5B)     [] MICU Status   [] Observation Status    Please use the following admission order set for stroke admission:   [] [id number] - KEV Intercerebral Hemorrhage Admission   [] [id number] - KEV Sub Arachnoid Hemorrhage Admission   [] [id number] - KEV Ischemic Stroke TPA Treatment Focused   [] [id number] - IP Ischemic Stroke ICU Post Alteplase (TPA) Admission    [x] [id number] - GEN Ischemic Stroke Non-Thrombolytic Focussed      Imaging   - CT Head WO : done   - CTA Head and Neck : done   - MRI Brain WO :  - ECHO can be done as outpatient as medical management would not change    Medications  - Aspirin load of 325mg x 1 followed by 81mg daily   - Clopidogrel load of 300mg x 1 followed by 75mg daily   - Rosuvastatin 40 mg nightly     Labs  - Fasting Lipid panel  - HgbA1c lab    - PT/OT/SLP  - Hydrate with 1000cc bolus  - Neuro checks per protocol  - We recommend SBP <220  - Blood glucose goal less than 180  - Please avoid dextrose containing solutions    - IM consultation for medical management    Additional recommendations may follow    Please contact Neurology with any changes in patients neurologic status.        Minda Rincon MD   PGY 3 Neurology Resident  5/28/2022 at 3:29 PM

## 2022-05-28 NOTE — ED NOTES
The following labs labeled with pt sticker and tubed to lab:     [x] Blue     [x] Lavender   [] on ice  [x] Green/yellow  [x] Green/black [] on ice  [x] Yellow  [] Red  [] Pink      [] COVID-19 swab    [] Rapid  [] PCR  [] Flu swab  [] Peds Viral Panel     [] Urine Sample  [] Pelvic Cultures  [] Blood Cultures     Nic Delcid RN  05/28/22 9251

## 2022-05-28 NOTE — ED PROVIDER NOTES
[facility] 1C STEP DOWN  Emergency Department Encounter  EmergencyMedicine Resident     Pt Name:Aracelis Ramos  MRN: [id number]  [place] 1950  Date of evaluation: 5/28/22  PCP:  Helene Almodovar MD    This patient was evaluated in the Emergency Department for symptoms described in the history of present illness. The patient was evaluated in the context of the global COVID-19 pandemic, which necessitated consideration that the patient might be at risk for infection with the SARS-CoV-2 virus that causes COVID-19. Institutional protocols and algorithms that pertain to the evaluation of patients at risk for COVID-19 are in a state of rapid change based on information released by regulatory bodies including the CDC and federal and state organizations. These policies and algorithms were followed during the patient's care in the ED. CHIEF COMPLAINT       Chief Complaint   Patient presents with    Altered Mental Status     altered mental status appox. 1415 became difficult speech and weakness with facial droop, leaning to left       HISTORY OF PRESENT ILLNESS  (Location/Symptom, Timing/Onset, Context/Setting, Quality, Duration, Modifying Factors, Severity.)      Violeta Stovall is a 81 y.o. female who presents with an onset of dysarthria as well as left-sided weakness. Patient was with family visiting a relative who is currently being treated in the emergency department when she had the onset of the symptoms last known well was 1400 approxi-1 hour prior to arrival.  Patient arrives to room floor in a wheelchair leaning to the left side with dysarthria and mild aphasia and right symptoms like this before point-of-care testing showed glucose in the 380s patient is an insulin-dependent diabetic. No trauma. PAST MEDICAL / SURGICAL / SOCIAL / FAMILY HISTORY      has a past medical history of Depression, Diabetes mellitus ([address] Hypertension, and Tremors of nervous system.   Insulin-dependent diabetes, baseline tremor, Communication with Friends and Family: Not on file    Frequency of Social Gatherings with Friends and Family: Not on file    Attends Anabaptism Services: Not on file    Active Member of Clubs or Organizations: Not on file    Attends Club or Organization Meetings: Not on file    Marital Status: Not on file   Intimate Partner Violence:     Fear of Current or Ex-Partner: Not on file    Emotionally Abused: Not on file    Physically Abused: Not on file    Sexually Abused: Not on file   Housing Stability:     Unable to Pay for Housing in the Last Year: Not on file    Number of [place] in the Last Year: Not on file    Unstable Housing in the Last Year: Not on file       No family history on file. Allergies:  Morphine, Metformin, Sulfa antibiotics, and Adhesive tape    Home Medications:  Prior to Admission medications    Medication Sig Start Date End Date Taking? Authorizing Provider   pantoprazole (PROTONIX) 40 MG tablet TAKE ONE TABLET BY MOUTH DAILY 3/12/22  Yes Historical Provider, MD   levothyroxine (SYNTHROID) 125 MCG tablet TAKE ONE TABLET BY MOUTH DAILY 3/21/21  Yes Historical Provider, MD   Insulin Degludec (TRESIBA FLEXTOUCH) 100 UNIT/ML SOPN 10 units qhs 8/17/21  Yes Historical Provider, MD   gabapentin (NEURONTIN) 300 MG capsule at bedtime.   4/27/22   Historical Provider, MD   glimepiride (AMARYL) 4 MG tablet in the morning and at bedtime  5/28/22   Historical Provider, MD   lisinopril (PRINIVIL;ZESTRIL) 10 MG tablet  5/18/22   Historical Provider, MD   JANUVIA 50 MG tablet  3/15/22   Historical Provider, MD   carvedilol (COREG) 25 MG tablet 2 times daily (with meals)  3/15/22   Historical Provider, MD   sertraline (ZOLOFT) 100 MG tablet  3/15/22   Historical Provider, MD   ibuprofen (ADVIL;MOTRIN) 800 MG tablet  3/15/22   Historical Provider, MD   cyclobenzaprine (FLEXERIL) 10 mg tablet nightly as needed  3/15/22   Historical Provider, MD       REVIEW OF SYSTEMS    (2-9 systems for level 4, 10 or more for level 5)      Review of Systems   Constitutional: Negative for fever. HENT: Negative for congestion. Eyes: Negative for photophobia. Respiratory: Negative for shortness of breath. Cardiovascular: Negative for chest pain. Gastrointestinal: Positive for diarrhea. Genitourinary: Negative for flank pain. Musculoskeletal: Negative for neck pain and neck stiffness. Skin: Negative for pallor, rash and wound. Allergic/Immunologic: Negative for environmental allergies and food allergies. Neurological: Positive for headaches. Psychiatric/Behavioral: Positive for confusion and decreased concentration. PHYSICAL EXAM   (up to 7 for level 4, 8 or more for level 5)      INITIAL VITALS:   BP (!) 154/75   Pulse 73   Temp 97.5 °F (36.4 °C)   Resp 21   Ht 5' 2\" (1.575 m)   Wt 210 lb (95.3 kg)   SpO2 90%   BMI 38.41 kg/m²     Physical Exam  Vitals and nursing note reviewed. Constitutional:       General: She is in acute distress. Appearance: Normal appearance. She is ill-appearing. HENT:      Head: Normocephalic and atraumatic. Right Ear: External ear normal.      Left Ear: External ear normal.      Nose: Nose normal.      Mouth/Throat:      Pharynx: Oropharynx is clear. Eyes:      Conjunctiva/sclera: Conjunctivae normal.   Cardiovascular:      Rate and Rhythm: Normal rate. Pulses: Normal pulses. Pulmonary:      Effort: Pulmonary effort is normal.   Abdominal:      Palpations: Abdomen is soft. Tenderness: There is no abdominal tenderness. Musculoskeletal:         General: Normal range of motion. Cervical back: Normal range of motion. Skin:     General: Skin is warm. Capillary Refill: Capillary refill takes less than 2 seconds. Neurological:      Mental Status: She is alert. Cranial Nerves: Cranial nerve deficit and dysarthria present. Motor: Weakness, tremor and abnormal muscle tone present.       Gait: Gait abnormal.   Psychiatric: Mood and Affect: Mood normal.         DIFFERENTIAL  DIAGNOSIS     PLAN (LABS / IMAGING / EKG):  Orders Placed This Encounter   Procedures    COVID-19, Rapid    RAPID INFLUENZA A/B ANTIGENS    Culture, Urine    CT HEAD WO CONTRAST    CTA HEAD NECK W CONTRAST    MRI BRAIN WO CONTRAST    XR CHEST PORTABLE    CBC with Auto Differential    Comprehensive Metabolic Panel    Lactic Acid    Urinalysis with Reflex to Culture    Troponin    Brain Natriuretic Peptide    Beta-Hydroxybutyrate    CK    Myoglobin, Serum    Protime-INR    APTT    ELECTROLYTES PLUS    Hemoglobin and hematocrit, blood    CALCIUM, IONIC (POC)    Troponin    Hemoglobin A1C    Lipid Panel    Lactic Acid    Microscopic Urinalysis    Comprehensive Metabolic Panel w/ Reflex to MG    CBC with Auto Differential    TSH with Reflex    Protein / creatinine ratio, urine    BLOOD GAS, VENOUS    T4, Free    Diet NPO    Vital signs    Up as tolerated    Adv Diet as Tolerated (nurse communication)    NIHSS    Tobacco cessation education    Swallow assessment by nursing before diet and oral medications started.     Stroke education    Admission/Observation order previously placed    Telemetry monitoring - 48 hour duration    HYPOGLYCEMIA TREATMENT: blood glucose less than 50 mg/dL and patient  ALERT and TOLERATING PO    HYPOGLYCEMIA TREATMENT: blood glucose less than 70 mg/dL and patient ALERT and TOLERATING PO    HYPOGLYCEMIA TREATMENT: blood glucose less than 70 mg/dL and patient NOT ALERT or NPO    Full Code    Inpatient consult to Stroke Team    Inpatient consult to Internal Medicine    OT eval and treat    PT evaluation and treat    Initiate Oxygen Therapy Protocol    Speech Language Pathology (SLP) eval and treat    POC CHEMISTRY (NA,K,ICA,GLU,CALC HCT/HGB,LACTATE,CREA,CL)    POC Glucose Fingerstick    Venous Blood Gas, POC    Creatinine W/GFR Point of Care    POCT urea (BUN)    Lactic Acid, POC  POCT Glucose    POCT Glucose    POC Glucose Fingerstick    POCT glucose    POCT Glucose    POCT glucose    POCT glucose    EKG 12 Lead    Echo Complete    ADMIT TO INPATIENT       MEDICATIONS ORDERED:  Orders Placed This Encounter   Medications    0.9 % sodium chloride bolus    iopamidol (ISOVUE-370) 76 % injection 90 mL    insulin lispro (HUMALOG) injection vial 5 Units    aspirin EC tablet 325 mg    clopidogrel (PLAVIX) tablet 300 mg    rosuvastatin (CRESTOR) tablet 40 mg    sodium chloride flush 0.9 % injection 10 mL    sodium chloride flush 0.9 % injection 10 mL    0.9 % sodium chloride infusion    OR Linked Order Group     ondansetron (ZOFRAN-ODT) disintegrating tablet 4 mg     ondansetron (ZOFRAN) injection 4 mg    magnesium hydroxide (MILK OF MAGNESIA) 400 MG/5ML suspension 30 mL    enoxaparin (LOVENOX) injection 40 mg     Order Specific Question:   Indication of Use     Answer:   Prophylaxis-DVT/PE    atorvastatin (LIPITOR) tablet 40 mg    perflutren lipid microspheres (DEFINITY) injection 1.65 mg    0.9 % sodium chloride infusion    clopidogrel (PLAVIX) tablet 75 mg    sertraline (ZOLOFT) tablet 100 mg    pantoprazole (PROTONIX) tablet 40 mg    levothyroxine (SYNTHROID) tablet 125 mcg    glucose chewable tablet 16 g    OR Linked Order Group     dextrose bolus 10% 125 mL     dextrose bolus 10% 250 mL    glucagon (rDNA) injection 1 mg    dextrose 5 % solution    cefTRIAXone (ROCEPHIN) 1000 mg IVPB in NS 50ml minibag     Order Specific Question:   Antimicrobial Indications     Answer:   Urinary Tract Infection     Order Specific Question:   UTI duration of therapy     Answer:   5 days    insulin glargine (LANTUS) injection vial 10 Units    insulin lispro (HUMALOG) injection vial 0-12 Units    insulin lispro (HUMALOG) injection vial 0-6 Units    cefTRIAXone (ROCEPHIN) 1,000 mg in sterile water 10 mL IV syringe     Order Specific Question:   Antimicrobial Indications Answer:   Urinary Tract Infection     Order Specific Question:   UTI duration of therapy     Answer:   5 days       DDX:cva, glucose abnormality    DIAGNOSTIC RESULTS / EMERGENCY DEPARTMENT COURSE / MDM   LAB RESULTS:  Results for orders placed or performed during the hospital encounter of 05/28/22   RAPID INFLUENZA A/B ANTIGENS    Specimen: Nasopharyngeal   Result Value Ref Range    Flu A Antigen NEGATIVE NEGATIVE    Flu B Antigen NEGATIVE NEGATIVE   CBC with Auto Differential   Result Value Ref Range    WBC 10.9 3.5 - 11.3 k/uL    RBC 5.07 3.95 - 5.11 m/uL    Hemoglobin 15.1 11.9 - 15.1 g/dL    Hematocrit 44.4 36.3 - 47.1 %    MCV 87.6 82.6 - 102.9 fL    MCH 29.8 25.2 - 33.5 pg    MCHC 34.0 28.4 - 34.8 g/dL    RDW 13.3 11.8 - 14.4 %    Platelets 786 964 - 090 k/uL    MPV 10.3 8.1 - 13.5 fL    NRBC Automated 0.0 0.0 per 100 WBC    Seg Neutrophils 60 36 - 65 %    Lymphocytes 29 24 - 43 %    Monocytes 6 3 - 12 %    Eosinophils % 3 1 - 4 %    Basophils 1 0 - 2 %    Immature Granulocytes 1 (H) 0 %    Segs Absolute 6.55 1.50 - 8.10 k/uL    Absolute Lymph # 3.15 1.10 - 3.70 k/uL    Absolute Mono # 0.70 0.10 - 1.20 k/uL    Absolute Eos # 0.28 0.00 - 0.44 k/uL    Basophils Absolute 0.13 0.00 - 0.20 k/uL    Absolute Immature Granulocyte 0.05 0.00 - 0.30 k/uL   Comprehensive Metabolic Panel   Result Value Ref Range    Glucose 345 (H) 70 - 99 mg/dL    BUN 23 8 - 23 mg/dL    CREATININE 1.32 (H) 0.50 - 0.90 mg/dL    Calcium 9.9 8.6 - 10.4 mg/dL    Sodium 131 (L) 135 - 144 mmol/L    Potassium 4.5 3.7 - 5.3 mmol/L    Chloride 93 (L) 98 - 107 mmol/L    CO2 22 20 - 31 mmol/L    Anion Gap 16 9 - 17 mmol/L    Alkaline Phosphatase 69 35 - 104 U/L    ALT 45 (H) 5 - 33 U/L    AST 42 (H) <32 U/L    Total Bilirubin 0.38 0.3 - 1.2 mg/dL    Total Protein 7.2 6.4 - 8.3 g/dL    Albumin 3.9 3.5 - 5.2 g/dL    Albumin/Globulin Ratio 1.2 1.0 - 2.5    GFR Non-African American 40 (L) >60 mL/min    GFR  48 (L) >60 mL/min    GFR Comment         Lactic Acid   Result Value Ref Range    Lactic Acid, Whole Blood 2.2 (H) 0.7 - 2.1 mmol/L   Urinalysis with Reflex to Culture    Specimen: Urine   Result Value Ref Range    Color, UA Yellow Yellow    Turbidity UA Clear Clear    Glucose, Ur 1+ (A) NEGATIVE    Bilirubin Urine NEGATIVE NEGATIVE    Ketones, Urine NEGATIVE NEGATIVE    Specific Gravity, UA 1.049 (H) 1.005 - 1.030    Urine Hgb NEGATIVE NEGATIVE    pH, UA 5.5 5.0 - 8.0    Protein, UA 1+ (A) NEGATIVE    Urobilinogen, Urine Normal Normal    Nitrite, Urine NEGATIVE NEGATIVE    Leukocyte Esterase, Urine SMALL (A) NEGATIVE   Troponin   Result Value Ref Range    Troponin, High Sensitivity 22 (H) 0 - 14 ng/L   Brain Natriuretic Peptide   Result Value Ref Range    Pro- (H) <300 pg/mL   Beta-Hydroxybutyrate   Result Value Ref Range    Beta-Hydroxybutyrate 0.29 (H) 0.02 - 0.27 mmol/L   CK   Result Value Ref Range    Total CK 63 26 - 192 U/L   Myoglobin, Serum   Result Value Ref Range    Myoglobin 84 (H) 25 - 58 ng/mL   Protime-INR   Result Value Ref Range    Protime 10.4 9.1 - 12.3 sec    INR 1.0    APTT   Result Value Ref Range    PTT 22.7 20.5 - 30.5 sec   ELECTROLYTES PLUS   Result Value Ref Range    POC Sodium 135 (L) 138 - 146 mmol/L    POC Potassium 4.9 (H) 3.5 - 4.5 mmol/L    POC Chloride 98 98 - 107 mmol/L    POC TCO2 28 22 - 30 mmol/L    Anion Gap 10 7 - 16 mmol/L   Hemoglobin and hematocrit, blood   Result Value Ref Range    POC Hemoglobin 15.6 12.0 - 16.0 g/dL    POC Hematocrit 46 36 - 46 %   CALCIUM, IONIC (POC)   Result Value Ref Range    POC Ionized Calcium 1.23 1.15 - 1.33 mmol/L   Troponin   Result Value Ref Range    Troponin, High Sensitivity 19 (H) 0 - 14 ng/L   Lipid Panel   Result Value Ref Range    Cholesterol 256 (H) <200 mg/dL    HDL 50 >40 mg/dL    LDL Cholesterol 145 (H) 0 - 130 mg/dL    Chol/HDL Ratio 5.1 (H) <5    Triglycerides 305 (H) <150 mg/dL   Lactic Acid   Result Value Ref Range    Lactic Acid, Whole Blood 1.6 0.7 - 2.1 mmol/L   Microscopic Urinalysis   Result Value Ref Range    -          WBC, UA 20 TO 50 0 - 5 /HPF    RBC, UA 0 TO 2 0 - 4 /HPF    Casts UA  0 - 8 /LPF     2 TO 5 HYALINE Reference range defined for non-centrifuged specimen.     Epithelial Cells UA 0 TO 2 0 - 5 /HPF    Bacteria, UA MANY (A) None   TSH with Reflex   Result Value Ref Range    TSH 14.46 (H) 0.30 - 5.00 uIU/mL   Protein / creatinine ratio, urine   Result Value Ref Range    Total Protein, Urine 46 mg/dL    Creatinine, Ur 76.0 28.0 - 217.0 mg/dL    Urine Total Protein Creatinine Ratio 0.61 (H) 0.00 - 0.20   T4, Free   Result Value Ref Range    Thyroxine, Free 0.89 (L) 0.93 - 1.70 ng/dL   POC Glucose Fingerstick   Result Value Ref Range    POC Glucose 338 (H) 65 - 105 mg/dL   Venous Blood Gas, POC   Result Value Ref Range    pH, [place] 7.360 7.320 - 7.430    pCO2, [place] 49.0 41.0 - 51.0 mm Hg    pO2, [place] 33.5 30.0 - 50.0 mm Hg    HCO3, Venous 27.7 22.0 - 29.0 mmol/L    Positive Base Excess, [place] 1 0.0 - 3.0    O2 Sat, [place] 61 60.0 - 85.0 %   Creatinine W/GFR Point of Care   Result Value Ref Range    POC Creatinine 1.56 (H) 0.51 - 1.19 mg/dL    GFR Comment 40 (L) >60 mL/min    GFR Non- 33 (L) >60 mL/min    GFR Comment         POCT urea (BUN)   Result Value Ref Range    POC BUN 25 8 - 26 mg/dL   Lactic Acid, POC   Result Value Ref Range    POC Lactic Acid 1.88 (H) 0.56 - 1.39 mmol/L   POCT Glucose   Result Value Ref Range    POC Glucose 350 (H) 74 - 100 mg/dL   POCT Glucose   Result Value Ref Range    Glucose 323 mg/dL    QC OK? y    POC Glucose Fingerstick   Result Value Ref Range    POC Glucose 323 (H) 65 - 105 mg/dL       IMPRESSION: Is an acutely ill-appearing 11year-old female who arrives to room 4 from the waiting room with acute onset left-sided deficits dysarthria aphasia  No diaphoresis heart is regular rate and rhythm abdomen soft no external signs of trauma pupils are equal round reactive to light  plan will be stroke work-up will check point-of-care glucose on examination she is uncomfortable mildly confused. Anticipate admission    INITIAL NIH STROKE SCALE:    Time Performed:  304pm    1a. Level of consciousness:  1 - not alert but arousable by minor stimulation to obey, answer or respond  1b. Level of consciousness questions:  1 - answers one question correctly  1c. Level of consciousness questions:  0 - performs both tasks correctly  2. Best Gaze:  0 - normal  3. Visual:  0 - no visual loss  4. Facial Palsy:  0 - normal symmetric movement  5a. Motor left arm:  1 - drift, limb holds 90 (or 45) degrees but drifts down before full 10 seconds: does not hit bed  5b. Motor right arm:  0 - no drift, limb holds 90 (or 45) degrees for full 10 seconds  6a. Motor left le - drift; leg falls by the end of the 5 second period but does not hit bed  6b. Motor right le - no drift; leg holds 30 degree position for full 5 seconds  7. Limb Ataxia:  0 - absent  8. Sensory:  0 - normal; no sensory loss  9. Best Language:  1 - mild to moderate aphasia; some obvious loss of fluency or facility of comprehension without significant limitation on ideas expressed or form of expression. Reduction of speech and/or comprehension, however, makes conversation about provided materials difficult or impossible. For example, in conversation about provided materials, examiner can identify picture or naming card content from patient's response. 10.  Dysarthria:  1 - mild to moderate, patient slurs at least some words and at worst, can be understood with some difficulty  11.   Extinction and Inattention:  0 - no abnormality    TOTAL:  6    RADIOLOGY:  CT HEAD WO CONTRAST    Result Date: 2022  EXAMINATION: CT OF THE HEAD WITHOUT CONTRAST  2022 3:26 pm TECHNIQUE: CT of the head was performed without the administration of intravenous contrast. Automated exposure control, iterative reconstruction, and/or weight based adjustment of the mA/kV was utilized to reduce the radiation dose to as low as reasonably achievable. COMPARISON: None. HISTORY: ORDERING SYSTEM PROVIDED HISTORY: cva TECHNOLOGIST PROVIDED HISTORY: cva Decision Support Exception - unselect if not a suspected or confirmed emergency medical condition->Emergency Medical Condition (MA) Reason for Exam: AMS FINDINGS: BRAIN/VENTRICLES: There is no acute intracranial hemorrhage, mass effect or midline shift. No abnormal extra-axial fluid collection. The gray-white differentiation is maintained without evidence of an acute infarct. There is no evidence of hydrocephalus. ORBITS: The visualized portion of the orbits demonstrate no acute abnormality. SINUSES: The visualized paranasal sinuses and mastoid air cells demonstrate no acute abnormality. SOFT TISSUES/SKULL:  No acute abnormality of the visualized skull or soft tissues. No acute intracranial abnormality. RECOMMENDATIONS: Unavailable     XR CHEST PORTABLE    Result Date: 5/28/2022  EXAMINATION: ONE XRAY VIEW OF THE CHEST 5/28/2022 7:16 pm COMPARISON: None. HISTORY: ORDERING SYSTEM PROVIDED HISTORY: pulm congestion? TECHNOLOGIST PROVIDED HISTORY: pulm congestion? Reason for Exam: port uprt FINDINGS: The lungs are underinflated, resulting in vascular crowding and subsegmental atelectasis. Central pulmonary vascular congestion without overt pulmonary edema. No focal consolidation, pleural effusion or pneumothorax. The cardiac silhouette mediastinal contours are within normal limits. No acute bony abnormality. Central pulmonary vascular congestion without overt pulmonary edema. CTA HEAD NECK W CONTRAST    Result Date: 5/28/2022  EXAMINATION: CTA OF THE HEAD AND NECK WITH CONTRAST 5/28/2022 3:26 pm: TECHNIQUE: CTA of the head and neck was performed with the administration of intravenous contrast. Multiplanar reformatted images are provided for review. MIP images are provided for review.  Stenosis of the internal carotid arteries measured using NASCET criteria. Automated exposure control, iterative reconstruction, and/or weight based adjustment of the mA/kV was utilized to reduce the radiation dose to as low as reasonably achievable. COMPARISON: None. HISTORY: ORDERING SYSTEM PROVIDED HISTORY: [place] [id number] TECHNOLOGIST PROVIDED HISTORY: [place] [id number] Decision Support Exception - unselect if not a suspected or confirmed emergency medical condition->Emergency Medical Condition (MA) Reason for Exam: AMS FINDINGS: CTA NECK: AORTIC ARCH/ARCH VESSELS: No dissection or arterial injury. No significant stenosis of the brachiocephalic or subclavian arteries. CAROTID ARTERIES: No dissection, arterial injury, or hemodynamically significant stenosis by NASCET criteria. VERTEBRAL ARTERIES: There is 50% stenosis in the mid V2 segment of the left vertebral artery. No dissection, arterial injury, or significant stenosis in the remainder of the bilateral vertebral arteries. SOFT TISSUES: There is ground-glass attenuation at the lung apices, likely related to mild pulmonary vascular congestion versus pneumonitis. No cervical or superior mediastinal lymphadenopathy. The larynx and pharynx are unremarkable. No acute abnormality of the salivary and thyroid glands. BONES: No acute osseous abnormality. CTA HEAD: ANTERIOR CIRCULATION: There is 90% stenosis in the mid M1 segment of the left MCA. There is partial occlusion in the proximal M2 segment of the right MCA. No significant stenosis of the intracranial internal carotid, anterior cerebral arteries. No aneurysm. POSTERIOR CIRCULATION: There is 80% stenosis in the proximal P2 segment of the right PCA. There is 80% stenosis in the mid P2 segment of the left PCA. No significant stenosis of the vertebral, basilar arteries. No aneurysm. OTHER: No dural venous sinus thrombosis on this non-dedicated study. BRAIN: No mass effect or midline shift. No extra-axial fluid collection.  The gray-white differentiation is maintained. Partial occlusion in the proximal M2 segment of the right MCA. 90% stenosis in the mid M1 segment of the left MCA. 80% stenosis in the proximal P2 segment of the right PCA. 80% stenosis in the mid P2 segment of the left PCA. 50% stenosis in the mid V2 segment of the left vertebral artery. RECOMMENDATIONS: Unavailable     MRI BRAIN WO CONTRAST    Result Date: 5/28/2022  EXAMINATION: MRI OF THE BRAIN WITHOUT CONTRAST  5/28/2022 4:28 pm TECHNIQUE: Multiplanar multisequence MRI of the brain was performed without the administration of intravenous contrast. COMPARISON: None. HISTORY: ORDERING SYSTEM PROVIDED HISTORY: dysarthria, lean towards left side TECHNOLOGIST PROVIDED HISTORY: dysarthria, lean towards left side What is the sedation requirement?->None Decision Support Exception - unselect if not a suspected or confirmed emergency medical condition->Emergency Medical Condition (MA) Reason for Exam: dysarthria, lean towards left side Relevant Medical/Surgical History: ams FINDINGS: There are subtle punctate foci of restricted diffusion with increased signal on T2/FLAIR sequence involving the left periatrial white matter and left inferior parietal lobule subcortical white matter suggestive of acute infarction. There is no intracranial hemorrhage, or intracranial mass lesion. No mass effect, midline shift, or extra-axial collection is noted. There are mild nonspecific foci of periventricular and subcortical cerebral white matter T2/FLAIR hyperintensity, most likely representing chronic microangiopathic disease in this age group. Old lacunar infarction of right caudate head. The brain parenchyma is otherwise normal. The pituitary gland is normal in appearance. The cerebellar tonsils are in normal position. The ventricles, sulci, and cisterns are prominent suggestive of generalized volume loss. The intracranial flow voids are preserved.  The globes and orbits are within normal limits. The visualized extracranial structures including paranasal sinuses and mastoid air cells are unremarkable. IMPRESSION Subtle punctate foci of acute infarction involving the left periatrial white matter and left inferior parietal lobule subcortical white matter. . Old lacunar infarction right caudate head. There is no acute intracranial hemorrhage, or intracranial mass lesion. Mild chronic microangiopathic ischemic disease. Mild generalized volume loss. RECOMMENDATIONS: Unavailable         EKG  Sinus rhythm at a rate of 81 normal axis  CA interval 140 QRS duration 76 ms with normal R wave progression there are some subtle ST depression in lateral leads particular notable in V4 through V6 was lead I. There are no prior EKG images in the medical record for comparison. All EKG's are interpreted by the Emergency Department Physician who either signs or Co-signs this chart in the absence of a cardiologist.    EMERGENCY DEPARTMENT COURSE:  ED Course as of 05/28/22 2240   Sat May 28, 2022   1538 Patient was seen and evaluated upon arrival to the ED room 4 from the waiting room in a wheelchair with left-sided weakness and dysarthria and aphasia\" upon arrival to the room she required assistance transitioning to the bed. [BG]   1545 No bleed on noncon ct head [BG]   1620 IMPRESSION:  Partial occlusion in the proximal M2 segment of the right MCA.     90% stenosis in the mid M1 segment of the left MCA.     80% stenosis in the proximal P2 segment of the right PCA.     80% stenosis in the mid P2 segment of the left PCA.     50% stenosis in the mid V2 segment of the left vertebral artery.  [BG]   1624 Troponin(!):    Troponin, High Sensitivity 22(!) [BG]   1624 Brain Natriuretic Peptide(!):    Pro-(!) [BG]   1624 Beta-Hydroxybutyrate(!):    Beta-Hydroxybutyrate 0.29(!) [BG]   1624 Comprehensive Metabolic Panel(!):    GLUCOSE, FASTING,(!)   BUN,BUNPL 23   Creatinine 1.32(!)   CALCIUM, SERUM, 690286 9.9 Sodium 131(!)   Potassium 4.5   Chloride 93(!)   CO2 22   Anion Gap 16   Alk Phos 69   ALT 45(!)   AST 42(!)   Bilirubin 0.38   Total Protein 7.2   Albumin 3.9   ALBUMIN/GLOBULIN RATIO 1.2   GFR Non- 40(!)   GFR  48(!)   GFR Comment      [BG]   1624 CBC with Auto Differential(!):    WBC 10.9   RBC 5.07   Hemoglobin Quant 15.1   Hematocrit 44.4   MCV 87.6   MCH 29.8   MCHC 34.0   RDW 13.3   Platelet Count 011   MPV 10.3   NRBC Automated 0.0   Seg Neutrophils 60   Lymphocytes 29   Monocytes 6   Eosinophils % 3   Basophils 1   Immature Granulocytes 1(!)   Segs Absolute 6.55   Absolute Lymph # 3.15   Absolute Mono # 0.70   Absolute Eos # 0.28   Basophils Absolute 0.13   Absolute Immature Granulocyte 0.05  5units humalog, repeat trop [BG]   1800 MRI BRAIN WO CONTRAST [BG]   1800 Acute stroke [BG]   1811 Troponin(!):    Troponin, High Sensitivity 19(!)  Trop downtrending [BG]      ED Course User Index  [BG] Dorothy Weaver DO         No notes of EC Admission Criteria type on file. PROCEDURES:      CONSULTS:  IP CONSULT TO STROKE TEAM  IP CONSULT TO INTERNAL MEDICINE    CRITICAL CARE:      FINAL IMPRESSION      1. Dehydration    2. ALPHONSE (acute kidney injury) ([facility]    3. Cerebrovascular accident (CVA), unspecified mechanism ([facility]          DISPOSITION / PLAN     DISPOSITION Admitted 05/28/2022 06:19:56 PM      PATIENT REFERRED TO:  No follow-up provider specified.     DISCHARGE MEDICATIONS:  Current Discharge Medication List          Dorothy Weaver DO  Emergency Medicine Resident    (Please note that portions of thisnote were completed with a voice recognition program.  Efforts were made to edit the dictations but occasionally words are mis-transcribed.)       Dorothy Weaver DO  Resident  05/28/22 9841

## 2022-05-28 NOTE — PROGRESS NOTES
I did not participate in the management of this patient    Electronically signed by Essie Pop MD on 5/28/2022 at 3:12 PM

## 2022-05-28 NOTE — CONSULTS
Department of Endovascular Neurosurgery  Resident Consult Note  Stroke Alert paged @ 320 PM  ER Room # 4  Arrival to patient bedside @ 329 PM        Reason for Consult:  Stroke alert  Requesting Physician:  Dr. Bandar Brooks  Endovascular Neurosurgeon:   [x]Dr. Natalie Amaya  []Dr. Jayla Serrano  []Dr. Brooklyn Poon   []    History Obtained From:  patient, family member - daughter and sister in-law at bedside    CHIEF COMPLAINT:       Slurred speech and lean to left    HISTORY OF PRESENT ILLNESS:       The patient is a 81 y.o. female with PMH of HTN, DM, HLP, Hypothyroid, prior left sided bells palsy who presents with complaint of slurred speech and left sided weakness. Patient was in the waiting room of the ER visiting her mother who was here when her daughter and sister in-law noted her to have slurred speech and lean towards her left along with a left droop more pronounced. Patient was brought in to ER and blood glucose was checked and was 380s    Last know well: 1415    On presentation:  BP: 150/73  BSL: 380    Prior to arrival patient was on  Antiplatelets/anticoagulants: no  Statins: unsure if presently, but reportedly on atorvastatin previously      Smoking history: non-smoker       PAST MEDICAL HISTORY :       Past Medical History:    No past medical history on file. Past Surgical History:    No past surgical history on file.     Social History:   Social History     Socioeconomic History    Marital status: Not on file     Spouse name: Not on file    Number of children: Not on file    Years of education: Not on file    Highest education level: Not on file   Occupational History    Not on file   Tobacco Use    Smoking status: Not on file    Smokeless tobacco: Not on file   Substance and Sexual Activity    Alcohol use: Not on file    Drug use: Not on file    Sexual activity: Not on file   Other Topics Concern    Not on file   Social History Narrative    Not on file     Social Determinants of Health     Financial Resource Strain:     Difficulty of Paying Living Expenses: Not on file   Food Insecurity:     Worried About Running Out of Food in the Last Year: Not on file    Bandar of Food in the Last Year: Not on file   Transportation Needs:     Lack of Transportation (Medical): Not on file    Lack of Transportation (Non-Medical): Not on file   Physical Activity:     Days of Exercise per Week: Not on file    Minutes of Exercise per Session: Not on file   Stress:     Feeling of Stress : Not on file   Social Connections:     Frequency of Communication with Friends and Family: Not on file    Frequency of Social Gatherings with Friends and Family: Not on file    Attends Restorationism Services: Not on file    Active Member of [address] [place] [address] [employer] or Organizations: Not on file    Attends Club or Organization Meetings: Not on file    Marital Status: Not on file   Intimate Partner Violence:     Fear of Current or Ex-Partner: Not on file    Emotionally Abused: Not on file    Physically Abused: Not on file    Sexually Abused: Not on file   Housing Stability:     Unable to Pay for Housing in the Last Year: Not on file    Number of [place] in the Last Year: Not on file    Unstable Housing in the Last Year: Not on file       Family History:   No family history on file. Allergies:  Patient has no known allergies.     Home Medications:  Prior to Admission medications    Not on File       Current Medications:   Current Facility-Administered Medications: 0.9 % sodium chloride bolus, 1,000 mL, IntraVENous, Once    REVIEW OF SYSTEMS:       CONSTITUTIONAL: negative for fatigue and malaise   EYES: negative for double vision and photophobia    HEENT: negative for tinnitus and sore throat   RESPIRATORY: negative for cough, shortness of breath   CARDIOVASCULAR: negative for chest pain, palpitations   GASTROINTESTINAL: negative for nausea, vomiting   GENITOURINARY: negative for incontinence   MUSCULOSKELETAL: negative for neck or back pain NEUROLOGICAL: negative for seizures   PSYCHIATRIC: negative for fatigue     Review of systems otherwise negative. PHYSICAL EXAM:       BP (!) 150/73   Pulse 80   Temp 98.1 °F (36.7 °C) (Oral)   Resp 16   Ht 5' 2\" (1.575 m)   Wt 210 lb (95.3 kg)   SpO2 95%   BMI 38.41 kg/m²     CONSTITUTIONAL:  Well developed, well nourished, alert and oriented x 3, in no acute distress. GCS 15, nontoxic. No dysarthria, no aphasia. HEAD:  normocephalic, atraumatic    EYES:  PERRLA, EOMI.   ENT:  moist mucous membranes   NECK:  supple, symmetric, no midline tenderness to palpation    BACK:  without midline tenderness, step-offs or deformities    LUNGS:  Equal air entry bilaterally   CARDIOVASCULAR:  normal s1 / s2   ABDOMEN:  Soft, no rigidity   NEUROLOGIC:  Mental Status:  A & O x3,awake             Cranial Nerves:    cranial nerves II-XII are grossly intact except for mild left facial droop    Motor Exam:    Drift:  absent  Tone:  normal    Motor exam is symmetrical 5 out of 5 all extremities bilaterally    Sensory:    Touch:    Right Upper Extremity:  normal  Left Upper Extremity:  normal  Right Lower Extremity:  normal  Left Lower Extremity:  normal    Deep Tendon Reflexes:    Right Bicep:  2+  Left Bicep:  2+  Right Knee:  2+  Left Knee:  2+    Plantar Response:  Right:  downgoing  Left:  downgoing    Clonus:  N/A  Manuel's:  N/A    Coordination/Dysmetria:  Heel to Shin:  Right:  normal  Left:  normal  Finger to Nose:   Right:  normal  Left:  normal   Dysdiadochokinesia:  N/A    Gait:  Not tested due to condition    INITIAL NIH STROKE SCALE:    Time Performed:  340 PM     1a. Level of consciousness:  0 - alert; keenly responsive  1b. Level of consciousness questions:  0 - answers both questions correctly  1c. Level of consciousness questions:  0 - performs both tasks correctly  2. Best Gaze:  0 - normal  3. Visual:  0 - no visual loss  4.     Facial Palsy:  1 - minor paralysis (flattened nasolabial fold, asymmetric on smiling)  5a. Motor left arm:  0 - no drift, limb holds 90 (or 45) degrees for full 10 seconds  5b. Motor right arm:  0 - no drift, limb holds 90 (or 45) degrees for full 10 seconds  6a. Motor left le - no drift; leg holds 30 degree position for full 5 seconds  6b. Motor right le - no drift; leg holds 30 degree position for full 5 seconds  7. Limb Ataxia:  0 - absent  8. Sensory:  0 - normal; no sensory loss  9. Best Language:  0 - no aphasia, normal  10. Dysarthria:  0 - normal  11.   Extinction and Inattention:  0 - no abnormality    TOTAL:  1     SKIN:  no rash      Modified Raquel Score Scale:     [] Zero: No symptoms at all   [x] 1: No significant disability despite symptoms; able to carry out all usual duties and activities   [] 2: Slight disability; unable to carry out all previous activities, but able to look after own affairs without assistance   [] 3:Moderate disability; requiring some help, but able to walk without assistance   [] 4: Moderately severe disability; unable to walk and attend to bodily needs without assistance   [] 5:Severe disability; bedridden, incontinent and requiring constant nursing care and attention       LABS AND IMAGING:     CBC with Differential:  No results found for: WBC, RBC, HGB, HCT, PLT, MCV, MCH, MCHC, RDW, NRBC, SEGSPCT, BANDSPCT, BLASTSPCT, METASPCT, LYMPHOPCT, PROMYELOPCT, MONOPCT, MYELOPCT, EOSPCT, BASOPCT, MONOSABS, LYMPHSABS, EOSABS, BASOSABS, DIFFTYPE      BMP:  No results found for: NA, K, CL, CO2, BUN, LABALBU, CREATININE, CALCIUM, GFRAA, LABGLOM, GLUCOSE, GLU    Radiology Review:    1.) CT brain without contrast:  Impression   No acute intracranial abnormality.       RECOMMENDATIONS:   Unavailable     2.) CTA Head/Neck:   Impression   Partial occlusion in the proximal M2 segment of the right MCA.       90% stenosis in the mid M1 segment of the left MCA.       80% stenosis in the proximal P2 segment of the right PCA.       80% stenosis in the mid P2 segment of the left PCA.       50% stenosis in the mid V2 segment of the left vertebral artery.       RECOMMENDATIONS:   Unavailable     3.) Brain MRI W/O:   IMPRESSION   Subtle punctate foci of acute infarction involving the left periatrial white   matter and left inferior parietal lobule subcortical white matter. .       Old lacunar infarction right caudate head.       There is no acute intracranial hemorrhage, or intracranial mass lesion.       Mild chronic microangiopathic ischemic disease.       Mild generalized volume loss.       RECOMMENDATIONS:   Unavailable       ASSESSMENT AND PLAN:     There is no problem list on file for this patient. Assessment                 81 y.o. female with PMH of HTN, DM, HLP, Hypothyroid, prior left sided bells palsy who presents with complaint of slurred speech and left sided weakness. CTA with diffuse intracranial athero. [facility]  MRI positive for stroke  Sx likely secondary to dehydration  Also appears to have ALPHONSE    1. Last Known Well (date and time): 5/28/2022 @ 7951    2. Candidate for IV tPA therapy     Yes []     No  [x] due to the following exclusion criteria: sx improved, NIH 1    3.  Candidate for Thrombectomy    Yes []      No [x] due to the following exclusion criteria: no LVO    - Discussed with Dr. Shawn Dickens     Recommendations:    [x] General Neurology Care Status - prefer 5th floor (5A/5C)   [] Internal Medicine General Care Status   [] NICU Status - (5B)     [] MICU Status   [] Observation Status    Please use the following admission order set for stroke admission:   [] [id number] - KEV Intercerebral Hemorrhage Admission   [] [id number] - KEV Sub Arachnoid Hemorrhage Admission   [] [id number] - KEV Ischemic Stroke TPA Treatment Focused   [] [id number] - IP Ischemic Stroke ICU Post Alteplase (TPA) Admission    [x] [id number] - GEN Ischemic Stroke Non-Thrombolytic Focussed      Imaging   - CT Head  WO : done   - CTA Head and Neck : done   - MRI Brain WO :  - ECHO can be done as outpatient as medical management would not change    Medications   -  Aspirin load of 325mg x 1 followed by 81mg daily   - Clopidogrel load of 300mg x 1 followed by 75mg daily   - Rosuvastatin 40 mg nightly     Labs  - Fasting Lipid panel  - HgbA1c lab    - PT/OT/SLP  - Hydrate with 1000cc bolus  - Neuro checks per protocol  - We recommend SBP <220  - Blood glucose goal less than 180  - Please avoid dextrose containing solutions        Additional recommendations may follow    Please contact [facility] with any changes in patients neurologic status. Thank you for your consult.        Pamella Vigil MD   PGY 3 Neurology Resident  5/28/2022 at 3:29 PM

## 2022-05-28 NOTE — ED PROVIDER NOTES
[facility] [facility] [address] [facility]     Emergency Department     Faculty Attestation    I performed a history and physical examination of the patient and discussed management with the resident. I reviewed the residents note and agree with the documented findings and plan of care. Any areas of disagreement are noted on the chart. I was personally present for the key portions of any procedures. I have documented in the chart those procedures where I was not present during the key portions. I have reviewed the emergency nurses triage note. I agree with the chief complaint, past medical history, past surgical history, allergies, medications, social and family history as documented unless otherwise noted below. For Physician Assistant/ Nurse Practitioner cases/documentation I have personally evaluated this patient and have completed at least one if not all key elements of the E/M (history, physical exam, and MDM). Additional findings are as noted. Patient presents with altered mental status, dysarthria, and leaning to the left side. According to family, this started about 215 this afternoon. Patient has been having diarrhea and has been getting worked up for that. She has not had any recent illness with fever. She does not have a history of stroke and is not on any anticoagulants. On my exam, patient is resting comfortably in the bed. She is drowsy but will open her eyes to name. She complains of a headache. Speech is mildly dysarthric. A stroke alert critical was paged. Will obtain CT scans and imaging. Will await neurology recommendations.     EKG Interpretation    Interpreted by emergency department physician    Rhythm: normal sinus   Rate: normal  Axis: normal  Ectopy: none  Conduction: normal  ST Segments: normal  T Waves: non specific changes  Q Waves: none    Clinical Impression: non-specific EKG    MD Caden Thompson MD  Attending Emergency  Physician              Cherelle Marcus MD  05/28/22 1530

## 2022-05-28 NOTE — ACP (ADVANCE CARE PLANNING)
portable DNR orders.     Length of ACP Conversation in minutes:      Conversation Outcomes:  [x] ACP discussion completed  [] Existing advance directive reviewed with patient; no changes to patient's previously recorded wishes  [] New Advance Directive completed  [] Portable Do Not Rescitate prepared for Provider review and signature  [] POLST/POST/MOLST/MOST prepared for Provider review and signature      Follow-up plan:    [] Schedule follow-up conversation to continue planning  [] Referred individual to Provider for additional questions/concerns   [] Advised patient/agent/surrogate to review completed ACP document and update if needed with changes in condition, patient preferences or care setting    [] This note routed to one or more involved healthcare providers

## 2022-05-29 LAB
ABSOLUTE EOS #: 0.2 K/UL (ref 0–0.44)
ABSOLUTE IMMATURE GRANULOCYTE: 0.04 K/UL (ref 0–0.3)
ABSOLUTE LYMPH #: 4.29 K/UL (ref 1.1–3.7)
ABSOLUTE MONO #: 0.77 K/UL (ref 0.1–1.2)
ALBUMIN SERPL-MCNC: 3.4 G/DL (ref 3.5–5.2)
ALBUMIN/GLOBULIN RATIO: 1.3 (ref 1–2.5)
ALP BLD-CCNC: 57 U/L (ref 35–104)
ALT SERPL-CCNC: 36 U/L (ref 5–33)
ANION GAP SERPL CALCULATED.3IONS-SCNC: 12 MMOL/L (ref 9–17)
AST SERPL-CCNC: 30 U/L
BASOPHILS # BLD: 1 % (ref 0–2)
BASOPHILS ABSOLUTE: 0.07 K/UL (ref 0–0.2)
BILIRUB SERPL-MCNC: 0.26 MG/DL (ref 0.3–1.2)
BUN BLDV-MCNC: 22 MG/DL (ref 8–23)
CALCIUM SERPL-MCNC: 9.2 MG/DL (ref 8.6–10.4)
CHLORIDE BLD-SCNC: 97 MMOL/L (ref 98–107)
CO2: 23 MMOL/L (ref 20–31)
CREAT SERPL-MCNC: 1.19 MG/DL (ref 0.5–0.9)
EOSINOPHILS RELATIVE PERCENT: 2 % (ref 1–4)
ESTIMATED AVERAGE GLUCOSE: 269 MG/DL
GFR AFRICAN AMERICAN: 54 ML/MIN
GFR NON-AFRICAN AMERICAN: 45 ML/MIN
GFR SERPL CREATININE-BSD FRML MDRD: ABNORMAL ML/MIN/{1.73_M2}
GLUCOSE BLD-MCNC: 125 MG/DL (ref 65–105)
GLUCOSE BLD-MCNC: 168 MG/DL (ref 65–105)
GLUCOSE BLD-MCNC: 174 MG/DL (ref 70–99)
GLUCOSE BLD-MCNC: 211 MG/DL (ref 65–105)
GLUCOSE BLD-MCNC: 225 MG/DL (ref 65–105)
GLUCOSE BLD-MCNC: 247 MG/DL (ref 65–105)
HBA1C MFR BLD: 11 % (ref 4–6)
HCT VFR BLD CALC: 38.8 % (ref 36.3–47.1)
HEMOGLOBIN: 13.5 G/DL (ref 11.9–15.1)
IMMATURE GRANULOCYTES: 0 %
LYMPHOCYTES # BLD: 41 % (ref 24–43)
MAGNESIUM: 1.6 MG/DL (ref 1.6–2.6)
MCH RBC QN AUTO: 30.3 PG (ref 25.2–33.5)
MCHC RBC AUTO-ENTMCNC: 34.8 G/DL (ref 28.4–34.8)
MCV RBC AUTO: 87.2 FL (ref 82.6–102.9)
MONOCYTES # BLD: 7 % (ref 3–12)
NRBC AUTOMATED: 0 PER 100 WBC
PDW BLD-RTO: 13.2 % (ref 11.8–14.4)
PLATELET # BLD: 144 K/UL (ref 138–453)
PMV BLD AUTO: 10.3 FL (ref 8.1–13.5)
POTASSIUM SERPL-SCNC: 3.5 MMOL/L (ref 3.7–5.3)
RBC # BLD: 4.45 M/UL (ref 3.95–5.11)
SARS-COV-2, RAPID: NOT DETECTED
SEG NEUTROPHILS: 49 % (ref 36–65)
SEGMENTED NEUTROPHILS ABSOLUTE COUNT: 5.1 K/UL (ref 1.5–8.1)
SODIUM BLD-SCNC: 132 MMOL/L (ref 135–144)
SPECIMEN DESCRIPTION: NORMAL
TOTAL PROTEIN: 6.1 G/DL (ref 6.4–8.3)
WBC # BLD: 10.5 K/UL (ref 3.5–11.3)

## 2022-05-29 PROCEDURE — 6360000002 HC RX W HCPCS: Performed by: STUDENT IN AN ORGANIZED HEALTH CARE EDUCATION/TRAINING PROGRAM

## 2022-05-29 PROCEDURE — 6370000000 HC RX 637 (ALT 250 FOR IP): Performed by: STUDENT IN AN ORGANIZED HEALTH CARE EDUCATION/TRAINING PROGRAM

## 2022-05-29 PROCEDURE — 99233 SBSQ HOSP IP/OBS HIGH 50: CPT | Performed by: PSYCHIATRY & NEUROLOGY

## 2022-05-29 PROCEDURE — 99223 1ST HOSP IP/OBS HIGH 75: CPT | Performed by: PSYCHIATRY & NEUROLOGY

## 2022-05-29 PROCEDURE — 97530 THERAPEUTIC ACTIVITIES: CPT

## 2022-05-29 PROCEDURE — 99222 1ST HOSP IP/OBS MODERATE 55: CPT | Performed by: INTERNAL MEDICINE

## 2022-05-29 PROCEDURE — 2500000003 HC RX 250 WO HCPCS: Performed by: STUDENT IN AN ORGANIZED HEALTH CARE EDUCATION/TRAINING PROGRAM

## 2022-05-29 PROCEDURE — 2580000003 HC RX 258: Performed by: FAMILY MEDICINE

## 2022-05-29 PROCEDURE — 6360000002 HC RX W HCPCS: Performed by: FAMILY MEDICINE

## 2022-05-29 PROCEDURE — 85025 COMPLETE CBC W/AUTO DIFF WBC: CPT

## 2022-05-29 PROCEDURE — 83735 ASSAY OF MAGNESIUM: CPT

## 2022-05-29 PROCEDURE — 6370000000 HC RX 637 (ALT 250 FOR IP): Performed by: FAMILY MEDICINE

## 2022-05-29 PROCEDURE — 1200000000 HC SEMI PRIVATE

## 2022-05-29 PROCEDURE — 93005 ELECTROCARDIOGRAM TRACING: CPT | Performed by: STUDENT IN AN ORGANIZED HEALTH CARE EDUCATION/TRAINING PROGRAM

## 2022-05-29 PROCEDURE — 97166 OT EVAL MOD COMPLEX 45 MIN: CPT

## 2022-05-29 PROCEDURE — 97162 PT EVAL MOD COMPLEX 30 MIN: CPT

## 2022-05-29 PROCEDURE — 97535 SELF CARE MNGMENT TRAINING: CPT

## 2022-05-29 PROCEDURE — 36415 COLL VENOUS BLD VENIPUNCTURE: CPT

## 2022-05-29 PROCEDURE — 80053 COMPREHEN METABOLIC PANEL: CPT

## 2022-05-29 RX ORDER — POTASSIUM CHLORIDE 20 MEQ/1
40 TABLET, EXTENDED RELEASE ORAL ONCE
Status: COMPLETED | OUTPATIENT
Start: 2022-05-29 | End: 2022-05-29

## 2022-05-29 RX ORDER — MAGNESIUM SULFATE IN WATER 40 MG/ML
2000 INJECTION, SOLUTION INTRAVENOUS ONCE
Status: COMPLETED | OUTPATIENT
Start: 2022-05-29 | End: 2022-05-29

## 2022-05-29 RX ORDER — CARVEDILOL 25 MG/1
25 TABLET ORAL 2 TIMES DAILY WITH MEALS
Status: DISCONTINUED | OUTPATIENT
Start: 2022-05-29 | End: 2022-05-30 | Stop reason: HOSPADM

## 2022-05-29 RX ORDER — LISINOPRIL 5 MG/1
5 TABLET ORAL DAILY
Status: DISCONTINUED | OUTPATIENT
Start: 2022-05-29 | End: 2022-05-30

## 2022-05-29 RX ORDER — LEVOTHYROXINE SODIUM 0.12 MG/1
125 TABLET ORAL DAILY
Status: DISCONTINUED | OUTPATIENT
Start: 2022-05-30 | End: 2022-05-30 | Stop reason: HOSPADM

## 2022-05-29 RX ORDER — INSULIN GLARGINE 100 [IU]/ML
15 INJECTION, SOLUTION SUBCUTANEOUS NIGHTLY
Status: DISCONTINUED | OUTPATIENT
Start: 2022-05-29 | End: 2022-05-30 | Stop reason: HOSPADM

## 2022-05-29 RX ORDER — ASPIRIN 81 MG/1
81 TABLET, CHEWABLE ORAL DAILY
Status: DISCONTINUED | OUTPATIENT
Start: 2022-05-29 | End: 2022-05-30 | Stop reason: HOSPADM

## 2022-05-29 RX ORDER — LABETALOL HYDROCHLORIDE 5 MG/ML
5 INJECTION, SOLUTION INTRAVENOUS
Status: ACTIVE | OUTPATIENT
Start: 2022-05-29 | End: 2022-05-29

## 2022-05-29 RX ORDER — LEVOTHYROXINE SODIUM 0.12 MG/1
125 TABLET ORAL DAILY
Qty: 30 TABLET | Refills: 3 | Status: CANCELLED | OUTPATIENT
Start: 2022-05-30

## 2022-05-29 RX ORDER — LABETALOL HYDROCHLORIDE 5 MG/ML
5 INJECTION, SOLUTION INTRAVENOUS EVERY 6 HOURS PRN
Status: DISCONTINUED | OUTPATIENT
Start: 2022-05-29 | End: 2022-05-29

## 2022-05-29 RX ADMIN — ENOXAPARIN SODIUM 40 MG: 100 INJECTION SUBCUTANEOUS at 09:15

## 2022-05-29 RX ADMIN — INSULIN LISPRO 2 UNITS: 100 INJECTION, SOLUTION INTRAVENOUS; SUBCUTANEOUS at 13:14

## 2022-05-29 RX ADMIN — MAGNESIUM SULFATE 2000 MG: 2 INJECTION INTRAVENOUS at 06:48

## 2022-05-29 RX ADMIN — ASPIRIN 81 MG: 81 TABLET, CHEWABLE ORAL at 11:17

## 2022-05-29 RX ADMIN — LEVOTHYROXINE SODIUM 125 MCG: 125 TABLET ORAL at 09:15

## 2022-05-29 RX ADMIN — SERTRALINE 100 MG: 50 TABLET, FILM COATED ORAL at 09:14

## 2022-05-29 RX ADMIN — CEFTRIAXONE SODIUM 1000 MG: 1 INJECTION, POWDER, FOR SOLUTION INTRAMUSCULAR; INTRAVENOUS at 21:37

## 2022-05-29 RX ADMIN — INSULIN LISPRO 2 UNITS: 100 INJECTION, SOLUTION INTRAVENOUS; SUBCUTANEOUS at 21:38

## 2022-05-29 RX ADMIN — LISINOPRIL 5 MG: 5 TABLET ORAL at 11:17

## 2022-05-29 RX ADMIN — INSULIN LISPRO 4 UNITS: 100 INJECTION, SOLUTION INTRAVENOUS; SUBCUTANEOUS at 17:40

## 2022-05-29 RX ADMIN — INSULIN GLARGINE 15 UNITS: 100 INJECTION, SOLUTION SUBCUTANEOUS at 21:38

## 2022-05-29 RX ADMIN — CARVEDILOL 25 MG: 25 TABLET, FILM COATED ORAL at 15:41

## 2022-05-29 RX ADMIN — POTASSIUM CHLORIDE 40 MEQ: 1500 TABLET, EXTENDED RELEASE ORAL at 09:14

## 2022-05-29 RX ADMIN — PANTOPRAZOLE SODIUM 40 MG: 40 TABLET, DELAYED RELEASE ORAL at 09:14

## 2022-05-29 RX ADMIN — CLOPIDOGREL 75 MG: 75 TABLET, FILM COATED ORAL at 09:15

## 2022-05-29 RX ADMIN — ROSUVASTATIN CALCIUM 40 MG: 20 TABLET, FILM COATED ORAL at 21:38

## 2022-05-29 RX ADMIN — SODIUM CHLORIDE: 9 INJECTION, SOLUTION INTRAVENOUS at 14:54

## 2022-05-29 ASSESSMENT — PAIN SCALES - GENERAL: PAINLEVEL_OUTOF10: 0

## 2022-05-29 NOTE — PROGRESS NOTES
[id number] [facility] Neurology   [address] [place] [address]    Progress note             Date:   5/29/2022  Patient name:  Monique Rouse  Date of admission:  5/28/2022  3:09 PM  MRN:   [id number]  Account:  [de-identified]  YOB: 1950  PCP:    Gini Gregory MD  Room:   [address] [place] [address]  Code Status:    Full Code    Chief Complaint:     Chief Complaint   Patient presents with    Altered Mental Status     altered mental status appox. 1415 became difficult speech and weakness with facial droop, leaning to left       Interval hx: The Patient was seen and examined at bedside  Is vitally stable alert oriented x3  No acute events overnight  The patient stated that she is improving  Pending echo      Brief History of Present Illness: The patient is a 81 y.o. Non- / non  female who presents with Altered Mental Status (altered mental status appox. 1415 became difficult speech and weakness with facial droop, leaning to left)   and she is admitted to the hospital for the management of left-sided weakness and dysarthria. History of Bell's palsy in the left side. Past Medical History:     Past Medical History:   Diagnosis Date    Depression     Diabetes mellitus ([facility] [address]     Hypertension     Tremors of nervous system 2020    being evaluated for parkinsons        Past Surgical History:     Past Surgical History:   Procedure Laterality Date    CHOLECYSTECTOMY      HYSTERECTOMY          Medications Prior to Admission:     Prior to Admission medications    Medication Sig Start Date End Date Taking?  Authorizing Provider   pantoprazole (PROTONIX) 40 MG tablet TAKE ONE TABLET BY MOUTH DAILY 3/12/22  Yes Historical Provider, MD   levothyroxine (SYNTHROID) 125 MCG tablet TAKE ONE TABLET BY MOUTH DAILY 3/21/21  Yes Historical Provider, MD   Insulin Degludec (TRESIBA FLEXTOUCH) 100 UNIT/ML SOPN 10 units qhs 8/17/21  Yes Historical Provider, MD   gabapentin (NEURONTIN) 300 MG capsule at bedtime. 4/27/22   Historical Provider, MD   glimepiride (AMARYL) 4 MG tablet in the morning and at bedtime  5/28/22   Historical Provider, MD   lisinopril (PRINIVIL;ZESTRIL) 10 MG tablet  5/18/22   Historical Provider, MD   JANUVIA 50 MG tablet  3/15/22   Historical Provider, MD   carvedilol (COREG) 25 MG tablet 2 times daily (with meals)  3/15/22   Historical Provider, MD   sertraline (ZOLOFT) 100 MG tablet  3/15/22   Historical Provider, MD   ibuprofen (ADVIL;MOTRIN) 800 MG tablet  3/15/22   Historical Provider, MD   cyclobenzaprine (FLEXERIL) 10 mg tablet nightly as needed  3/15/22   Historical Provider, MD   loperamide (LOPERAMIDE A-D) 2 MG tablet Take 2 tablets by mouth 2 times daily as needed    Historical Provider, MD        Allergies:     Morphine, Metformin, Sulfa antibiotics, and Adhesive tape    Social History:     Tobacco:    reports that she has never smoked. She has never used smokeless tobacco.  Alcohol:      reports no history of alcohol use. Drug Use:  reports no history of drug use. Family History:     No family history on file. Review of Systems:     ROS:  Constitutional  Negative for fever and chills    HEENT  Negative for ear discharge, ear pain, nosebleed    Eyes  Negative for photophobia, pain and discharge    Respiratory  Negative for hemoptysis and sputum    Cardiovascular  Negative for orthopnea, claudication and PND    Gastrointestinal  Negative for abdominal pain, diarrhea, blood in stool    Musculoskeletal  Negative for joint pain, negative for myalgia    Neurology Negative for seizures, loss of consciousness   Skin  Negative for rash or itching    Endo/heme/allergies  Negative for polydipsia, environmental allergy    Psychiatric/behavioral  Negative for suicidal ideation.  Patient is not anxious        Physical Exam:   BP (!) 159/68   Pulse 80   Temp 98.3 °F (36.8 °C) (Oral)   Resp 24   Ht 5' 2\" (1.575 m)   Wt 210 lb (95.3 kg)   SpO2 90%   BMI 38.41 kg/m²   Temp (24hrs), Av.9 °F (36.6 °C), Min:97.5 °F (36.4 °C), Max:98.3 °F (36.8 °C)    Recent Labs     22  1526 22  1846 22  2344 22  0741   POCGLU 350* 323* 211* 125*       Intake/Output Summary (Last 24 hours) at 2022 8789  Last data filed at 2022 3797  Gross per 24 hour   Intake 10 ml   Output 500 ml   Net -490 ml         NEUROLOGIC EXAMINATION  GENERAL  Appears comfortable and in no distress   HEENT  NC/ AT   NECK  Supple and no bruits heard   MENTAL STATUS:  Alert, oriented, intact memory, no confusion, normal speech, normal language, no hallucination or delusion   CRANIAL NERVES: II     -      Visual fields intact to confrontation  III,IV,VI -  EOMs full, no afferent defect, no DARRIUS, no ptosis  V     -     Normal facial sensation  VII    -     subtle left facial droop  VIII   -     Intact hearing  IX,X -     Symmetrical palate  XI    -     Symmetrical shoulder shrug  XII   -     Midline tongue, no atrophy    MOTOR FUNCTION:  significant for good strength of grade 5/5 in bilateral proximal and distal muscle groups of both upper and lower extremities with normal bulk, normal tone and no involuntary movements, no tremor   SENSORY FUNCTION:  Decree sensation in the distal upper and lower extremities   CEREBELLAR FUNCTION:  Intact fine motor control over upper limbs   REFLEX FUNCTION:  Symmetric, no perverted reflex, no Babinski sign   STATION and GAIT  Not tested       Investigations:      Laboratory Testing:  Recent Results (from the past 24 hour(s))   POC Glucose Fingerstick    Collection Time: 22  3:16 PM   Result Value Ref Range    POC Glucose 338 (H) 65 - 105 mg/dL   EKG 12 Lead    Collection Time: 22  3:18 PM   Result Value Ref Range    Ventricular Rate 81 BPM    Atrial Rate 81 BPM    P-R Interval 150 ms    QRS Duration 76 ms    Q-T Interval 390 ms    QTc Calculation (Bazett) 453 ms    P Axis 49 degrees    R Axis 4 degrees    T Axis 107 degrees   Venous Blood Gas, POC Collection Time: 05/28/22  3:26 PM   Result Value Ref Range    pH, Hebert 7.360 7.320 - 7.430    pCO2, Hebert 49.0 41.0 - 51.0 mm Hg    pO2, Hebert 33.5 30.0 - 50.0 mm Hg    HCO3, Venous 27.7 22.0 - 29.0 mmol/L    Positive Base Excess, Hebert 1 0.0 - 3.0    O2 Sat, Hebert 61 60.0 - 85.0 %   ELECTROLYTES PLUS    Collection Time: 05/28/22  3:26 PM   Result Value Ref Range    POC Sodium 135 (L) 138 - 146 mmol/L    POC Potassium 4.9 (H) 3.5 - 4.5 mmol/L    POC Chloride 98 98 - 107 mmol/L    POC TCO2 28 22 - 30 mmol/L    Anion Gap 10 7 - 16 mmol/L   Hemoglobin and hematocrit, blood    Collection Time: 05/28/22  3:26 PM   Result Value Ref Range    POC Hemoglobin 15.6 12.0 - 16.0 g/dL    POC Hematocrit 46 36 - 46 %   Creatinine W/GFR Point of Care    Collection Time: 05/28/22  3:26 PM   Result Value Ref Range    POC Creatinine 1.56 (H) 0.51 - 1.19 mg/dL    GFR Comment 40 (L) >60 mL/min    GFR Non- 33 (L) >60 mL/min    GFR Comment         CALCIUM, IONIC (POC)    Collection Time: 05/28/22  3:26 PM   Result Value Ref Range    POC Ionized Calcium 1.23 1.15 - 1.33 mmol/L   POCT urea (BUN)    Collection Time: 05/28/22  3:26 PM   Result Value Ref Range    POC BUN 25 8 - 26 mg/dL   Lactic Acid, POC    Collection Time: 05/28/22  3:26 PM   Result Value Ref Range    POC Lactic Acid 1.88 (H) 0.56 - 1.39 mmol/L   POCT Glucose    Collection Time: 05/28/22  3:26 PM   Result Value Ref Range    POC Glucose 350 (H) 74 - 100 mg/dL   CBC with Auto Differential    Collection Time: 05/28/22  3:30 PM   Result Value Ref Range    WBC 10.9 3.5 - 11.3 k/uL    RBC 5.07 3.95 - 5.11 m/uL    Hemoglobin 15.1 11.9 - 15.1 g/dL    Hematocrit 44.4 36.3 - 47.1 %    MCV 87.6 82.6 - 102.9 fL    MCH 29.8 25.2 - 33.5 pg    MCHC 34.0 28.4 - 34.8 g/dL    RDW 13.3 11.8 - 14.4 %    Platelets 401 927 - 510 k/uL    MPV 10.3 8.1 - 13.5 fL    NRBC Automated 0.0 0.0 per 100 WBC    Seg Neutrophils 60 36 - 65 %    Lymphocytes 29 24 - 43 %    Monocytes 6 3 - 12 % Eosinophils % 3 1 - 4 %    Basophils 1 0 - 2 %    Immature Granulocytes 1 (H) 0 %    Segs Absolute 6.55 1.50 - 8.10 k/uL    Absolute Lymph # 3.15 1.10 - 3.70 k/uL    Absolute Mono # 0.70 0.10 - 1.20 k/uL    Absolute Eos # 0.28 0.00 - 0.44 k/uL    Basophils Absolute 0.13 0.00 - 0.20 k/uL    Absolute Immature Granulocyte 0.05 0.00 - 0.30 k/uL   Comprehensive Metabolic Panel    Collection Time: 05/28/22  3:30 PM   Result Value Ref Range    Glucose 345 (H) 70 - 99 mg/dL    BUN 23 8 - 23 mg/dL    CREATININE 1.32 (H) 0.50 - 0.90 mg/dL    Calcium 9.9 8.6 - 10.4 mg/dL    Sodium 131 (L) 135 - 144 mmol/L    Potassium 4.5 3.7 - 5.3 mmol/L    Chloride 93 (L) 98 - 107 mmol/L    CO2 22 20 - 31 mmol/L    Anion Gap 16 9 - 17 mmol/L    Alkaline Phosphatase 69 35 - 104 U/L    ALT 45 (H) 5 - 33 U/L    AST 42 (H) <32 U/L    Total Bilirubin 0.38 0.3 - 1.2 mg/dL    Total Protein 7.2 6.4 - 8.3 g/dL    Albumin 3.9 3.5 - 5.2 g/dL    Albumin/Globulin Ratio 1.2 1.0 - 2.5    GFR Non-African American 40 (L) >60 mL/min    GFR  48 (L) >60 mL/min    GFR Comment         Lactic Acid    Collection Time: 05/28/22  3:30 PM   Result Value Ref Range    Lactic Acid, Whole Blood 2.2 (H) 0.7 - 2.1 mmol/L   Troponin    Collection Time: 05/28/22  3:30 PM   Result Value Ref Range    Troponin, High Sensitivity 22 (H) 0 - 14 ng/L   Brain Natriuretic Peptide    Collection Time: 05/28/22  3:30 PM   Result Value Ref Range    Pro- (H) <300 pg/mL   Beta-Hydroxybutyrate    Collection Time: 05/28/22  3:30 PM   Result Value Ref Range    Beta-Hydroxybutyrate 0.29 (H) 0.02 - 0.27 mmol/L   CK    Collection Time: 05/28/22  3:30 PM   Result Value Ref Range    Total CK 63 26 - 192 U/L   Myoglobin, Serum    Collection Time: 05/28/22  3:30 PM   Result Value Ref Range    Myoglobin 84 (H) 25 - 58 ng/mL   Protime-INR    Collection Time: 05/28/22  3:30 PM   Result Value Ref Range    Protime 10.4 9.1 - 12.3 sec    INR 1.0    APTT    Collection Time: 05/28/22 3:30 PM   Result Value Ref Range    PTT 22.7 20.5 - 30.5 sec   Hemoglobin A1C    Collection Time: 05/28/22  3:30 PM   Result Value Ref Range    Hemoglobin A1C 11.0 (H) 4.0 - 6.0 %    Estimated Avg Glucose 269 mg/dL   Lipid Panel    Collection Time: 05/28/22  3:30 PM   Result Value Ref Range    Cholesterol 256 (H) <200 mg/dL    HDL 50 >40 mg/dL    LDL Cholesterol 145 (H) 0 - 130 mg/dL    Chol/HDL Ratio 5.1 (H) <5    Triglycerides 305 (H) <150 mg/dL   TSH with Reflex    Collection Time: 05/28/22  3:30 PM   Result Value Ref Range    TSH 14.46 (H) 0.30 - 5.00 uIU/mL   T4, Free    Collection Time: 05/28/22  3:30 PM   Result Value Ref Range    Thyroxine, Free 0.89 (L) 0.93 - 1.70 ng/dL   Troponin    Collection Time: 05/28/22  5:37 PM   Result Value Ref Range    Troponin, High Sensitivity 19 (H) 0 - 14 ng/L   RAPID INFLUENZA A/B ANTIGENS    Collection Time: 05/28/22  5:46 PM    Specimen: Nasopharyngeal   Result Value Ref Range    Flu A Antigen NEGATIVE NEGATIVE    Flu B Antigen NEGATIVE NEGATIVE   Urinalysis with Reflex to Culture    Collection Time: 05/28/22  6:04 PM    Specimen: Urine   Result Value Ref Range    Color, UA Yellow Yellow    Turbidity UA Clear Clear    Glucose, Ur 1+ (A) NEGATIVE    Bilirubin Urine NEGATIVE NEGATIVE    Ketones, Urine NEGATIVE NEGATIVE    Specific Gravity, UA 1.049 (H) 1.005 - 1.030    Urine Hgb NEGATIVE NEGATIVE    pH, UA 5.5 5.0 - 8.0    Protein, UA 1+ (A) NEGATIVE    Urobilinogen, Urine Normal Normal    Nitrite, Urine NEGATIVE NEGATIVE    Leukocyte Esterase, Urine SMALL (A) NEGATIVE   Microscopic Urinalysis    Collection Time: 05/28/22  6:04 PM   Result Value Ref Range    -          WBC, UA 20 TO 50 0 - 5 /HPF    RBC, UA 0 TO 2 0 - 4 /HPF    Casts UA  0 - 8 /LPF     2 TO 5 HYALINE Reference range defined for non-centrifuged specimen.     Epithelial Cells UA 0 TO 2 0 - 5 /HPF    Bacteria, UA MANY (A) None   Protein / creatinine ratio, urine    Collection Time: 05/28/22  6:04 PM   Result Value Ref Range    Total Protein, Urine 46 mg/dL    Creatinine, Ur 76.0 28.0 - 217.0 mg/dL    Urine Total Protein Creatinine Ratio 0.61 (H) 0.00 - 0.20   POC Glucose Fingerstick    Collection Time: 05/28/22  6:46 PM   Result Value Ref Range    POC Glucose 323 (H) 65 - 105 mg/dL   POCT Glucose    Collection Time: 05/28/22  6:54 PM   Result Value Ref Range    Glucose 323 mg/dL    QC OK? y    Lactic Acid    Collection Time: 05/28/22  7:43 PM   Result Value Ref Range    Lactic Acid, Whole Blood 1.6 0.7 - 2.1 mmol/L   POC Glucose Fingerstick    Collection Time: 05/28/22 11:44 PM   Result Value Ref Range    POC Glucose 211 (H) 65 - 105 mg/dL   Comprehensive Metabolic Panel w/ Reflex to MG    Collection Time: 05/29/22  3:46 AM   Result Value Ref Range    Glucose 174 (H) 70 - 99 mg/dL    BUN 22 8 - 23 mg/dL    CREATININE 1.19 (H) 0.50 - 0.90 mg/dL    Calcium 9.2 8.6 - 10.4 mg/dL    Sodium 132 (L) 135 - 144 mmol/L    Potassium 3.5 (L) 3.7 - 5.3 mmol/L    Chloride 97 (L) 98 - 107 mmol/L    CO2 23 20 - 31 mmol/L    Anion Gap 12 9 - 17 mmol/L    Alkaline Phosphatase 57 35 - 104 U/L    ALT 36 (H) 5 - 33 U/L    AST 30 <32 U/L    Total Bilirubin 0.26 (L) 0.3 - 1.2 mg/dL    Total Protein 6.1 (L) 6.4 - 8.3 g/dL    Albumin 3.4 (L) 3.5 - 5.2 g/dL    Albumin/Globulin Ratio 1.3 1.0 - 2.5    GFR Non- 45 (L) >60 mL/min    GFR  54 (L) >60 mL/min    GFR Comment         CBC with Auto Differential    Collection Time: 05/29/22  3:46 AM   Result Value Ref Range    WBC 10.5 3.5 - 11.3 k/uL    RBC 4.45 3.95 - 5.11 m/uL    Hemoglobin 13.5 11.9 - 15.1 g/dL    Hematocrit 38.8 36.3 - 47.1 %    MCV 87.2 82.6 - 102.9 fL    MCH 30.3 25.2 - 33.5 pg    MCHC 34.8 28.4 - 34.8 g/dL    RDW 13.2 11.8 - 14.4 %    Platelets 151 141 - 696 k/uL    MPV 10.3 8.1 - 13.5 fL    NRBC Automated 0.0 0.0 per 100 WBC    Seg Neutrophils 49 36 - 65 %    Lymphocytes 41 24 - 43 %    Monocytes 7 3 - 12 %    Eosinophils % 2 1 - 4 %    Basophils 1 0 consulted, appreciate recommendations. Follow-up further recommendations after discussing the case with attending  The plan was discussed with the patient, patient's family and the medical staff. Consultations:   IP CONSULT TO STROKE TEAM  IP CONSULT TO INTERNAL MEDICINE    Patient is admitted as inpatient status because of co-morbidities listed above, severity of signs and symptoms as outlined, requirement for current medical therapies and most importantly because of direct risk to patient if care not provided in a hospital setting.     Damian Baldwin MD  5/29/2022  9:24 AM    Copy sent to Dr. Kathie Salas MD

## 2022-05-29 NOTE — PLAN OF CARE
Problem: Discharge Planning  Goal: Discharge to home or other facility with appropriate resources  Outcome: Progressing     Problem: Safety - Adult  Goal: Free from fall injury  Outcome: Progressing     Problem: ABCDS Injury Assessment  Goal: Absence of physical injury  Outcome: Progressing     Problem: Skin/Tissue Integrity - Adult  Goal: Skin integrity remains intact  Outcome: Progressing  Goal: Incisions, wounds, or drain sites healing without S/S of infection  Outcome: Progressing  Goal: Oral mucous membranes remain intact  Outcome: Progressing     Problem: Skin/Tissue Integrity - Adult  Goal: Incisions, wounds, or drain sites healing without S/S of infection  Outcome: Progressing       Problem: Musculoskeletal - Adult  Goal: Return mobility to safest level of function  Outcome: Progressing  Goal: Maintain proper alignment of affected body part  Outcome: Progressing  Goal: Return ADL status to a safe level of function  Outcome: Progressing

## 2022-05-29 NOTE — PROGRESS NOTES
Patient signed go off unit policy paperwork. Ok to go off unit/monitor per Dr. Jose Rodriguez, Neuro Resident.

## 2022-05-29 NOTE — PLAN OF CARE
Problem: Discharge Planning  Goal: Discharge to home or other facility with appropriate resources  Outcome: Progressing     Problem: Safety - Adult  Goal: Free from fall injury  Outcome: Progressing  Flowsheets (Taken 5/29/2022 0759)  Free From Fall Injury: Instruct family/caregiver on patient safety     Problem: ABCDS Injury Assessment  Goal: Absence of physical injury  Outcome: Progressing  Flowsheets (Taken 5/29/2022 0759)  Absence of Physical Injury: Implement safety measures based on patient assessment     Problem: Skin/Tissue Integrity - Adult  Goal: Skin integrity remains intact  Outcome: Progressing  Flowsheets  Taken 5/29/2022 0800  Skin Integrity Remains Intact: Monitor for areas of redness and/or skin breakdown  Taken 5/29/2022 0759  Skin Integrity Remains Intact: Monitor for areas of redness and/or skin breakdown  Goal: Incisions, wounds, or drain sites healing without S/S of infection  Outcome: Progressing  Goal: Oral mucous membranes remain intact  Outcome: Progressing     Problem: Musculoskeletal - Adult  Goal: Return mobility to safest level of function  Outcome: Progressing  Goal: Maintain proper alignment of affected body part  Outcome: Progressing  Goal: Return ADL status to a safe level of function  Outcome: Progressing     Problem: Chronic Conditions and Co-morbidities  Goal: Patient's chronic conditions and co-morbidity symptoms are monitored and maintained or improved  Outcome: Progressing

## 2022-05-29 NOTE — PROGRESS NOTES
[facility]  Internal Medicine Teaching Residency Program  Inpatient Daily Progress Note  ______________________________________________________________________________    Patient: Violeta Stovall  YOB: 1950   IKN:[id number]    Acct: [de-identified]     Room: [facility]  Admit date: 5/28/2022  Today's date: 05/29/22  Number of days in the hospital: 1    SUBJECTIVE   Admitting Diagnosis: Ischemic stroke ([facility] [address]  CC: slurred speech  Pt examined at bedside. Chart & results reviewed. /68, pulse 76, on 2L NC  Hyponatremia 132, hypokalemia 3.5. Mg 1.6. replaced. Echo with bubble study pending    ROS:  Constitutional:  negative for chills, fevers, sweats  Respiratory:  negative for cough, dyspnea on exertion, hemoptysis, shortness of breath, wheezing  Cardiovascular:  negative for chest pain, chest pressure/discomfort, lower extremity edema, palpitations  Gastrointestinal:  negative for abdominal pain, constipation, diarrhea, nausea, vomiting  Neurological:  negative for dizziness, headache  BRIEF HISTORY     The patient is a pleasant 81 y.o. female presents with a chief complaint of slurred speech. 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 Patient was at ED visiting relative, family noticed slurred speech and left sided leaning. No loss of consciousness, fall, incontinence. At ED, pt alert and oriented x 3, on 3L NC, /73, but as high as 198/100, pulse 80, NIH stroke scale 1 for minor facial palsy,   Significant labs: Cr 1.32, glycemia 380, lft unremarkable, b-hyfdroxy 0.29, cbc normal, INR 1, VBG normal   Imaging revealed  Partial occlusion in the proximal M2 segment of the right MCA. 90% stenosis in the mid M1 segment of the left MCA.  80% stenosis in the proximal P2 segment of the right PCA. 80% stenosis in the mid P2 segment of the left PCA. 50% stenosis in the mid V2 segment of the left vertebral artery. MRI brain Subtle punctate foci of acute infarction involving the left periatrial white matter and left inferior parietal lobule subcortical white matter. Old lacunar infarction right caudate head. Mild chronic microangiopathic ischemic disease. Mild generalized volume loss  Patient started on IVF, aspirin, plavix, rosuvastatin. Neurology on board    OBJECTIVE     Vital Signs:  BP (!) 164/68   Pulse 76   Temp 97.9 °F (36.6 °C) (Temporal)   Resp 17   Ht 5' 2\" (1.575 m)   Wt 210 lb (95.3 kg)   SpO2 90%   BMI 38.41 kg/m²     Temp (24hrs), Av.8 °F (36.6 °C), Min:97.5 °F (36.4 °C), Max:98.1 °F (36.7 °C)    In: -   Out: 500 [Urine:500]    Physical Exam:  Constitutional: This is a well developed, well nourished, 35-39.9 - Obesity Grade II 81y.o. year old female who is alert, oriented, cooperative and in no apparent distress. Head:Left facial palsy. normocephalic and atraumatic. EENT:  PERRLA. No conjunctival injections. Septum was midline, mucosa was without erythema, exudates or cobblestoning. No thrush was noted. Neck: Supple without thyromegaly. No elevated JVP. Trachea was midline. Respiratory: Chest was symmetrical without dullness to percussion. Breath sounds bilaterally were clear to auscultation. There were no wheezes, rhonchi or rales. There is no intercostal retraction or use of accessory muscles. No egophony noted. Cardiovascular: Regular without murmur, clicks, gallops or rubs. Abdomen: Slightly rounded and soft without organomegaly. No rebound, rigidity or guarding was appreciated. Lymphatic: No lymphadenopathy. Musculoskeletal: Normal curvature of the spine. No gross muscle weakness. Extremities:  No lower extremity edema, ulcerations, tenderness, varicosities or erythema.   Muscle size, tone and strength are normal.  No involuntary movements are noted. Skin:  Warm and dry. Good color, turgor and pigmentation. No lesions or scars. No cyanosis or clubbing  Neurological/Psychiatric: The patient's general behavior, level of consciousness, thought content and emotional status is normal.             Medications:  Scheduled Medications:    potassium chloride  40 mEq Oral Once    sodium chloride flush  10 mL IntraVENous 2 times per day    enoxaparin  40 mg SubCUTAneous Daily    clopidogrel  75 mg Oral Daily    sertraline  100 mg Oral Daily    pantoprazole  40 mg Oral Daily    levothyroxine  125 mcg Oral Daily    insulin glargine  10 Units SubCUTAneous Nightly    insulin lispro  0-12 Units SubCUTAneous TID WC    insulin lispro  0-6 Units SubCUTAneous Nightly    cefTRIAXone (ROCEPHIN) IV  1,000 mg IntraVENous Q24H    rosuvastatin  40 mg Oral Nightly     Continuous Infusions:    sodium chloride      sodium chloride 75 mL/hr at 05/28/22 2232    dextrose       PRN Medicationssodium chloride flush, 10 mL, PRN  sodium chloride, , PRN  ondansetron, 4 mg, Q8H PRN   Or  ondansetron, 4 mg, Q6H PRN  magnesium hydroxide, 30 mL, Daily PRN  perflutren lipid microspheres, 1.5 mL, ONCE PRN  glucose, 4 tablet, PRN  dextrose bolus, 125 mL, PRN   Or  dextrose bolus, 250 mL, PRN  glucagon (rDNA), 1 mg, PRN  dextrose, 100 mL/hr, PRN  loperamide, 2 mg, TID PRN        Diagnostic Labs:  CBC:   Recent Labs     05/28/22  1530 05/29/22  0346   WBC 10.9 10.5   RBC 5.07 4.45   HGB 15.1 13.5   HCT 44.4 38.8   MCV 87.6 87.2   RDW 13.3 13.2    144     BMP:   Recent Labs     05/28/22  1526 05/28/22  1530 05/29/22  0346   NA  --  131* 132*   K  --  4.5 3.5*   CL  --  93* 97*   CO2  --  22 23   BUN  --  23 22   CREATININE 1.56* 1.32* 1.19*     BNP: No results for input(s): BNP in the last 72 hours.   PT/INR:   Recent Labs     05/28/22  1530   PROTIME 10.4   INR 1.0     APTT:   Recent Labs     05/28/22  1530   APTT 22.7     CARDIAC ENZYMES: No results for input(s): CKMB, CKMBINDEX, TROPONINI in the last 72 hours. Invalid input(s): CKTOTAL;3  FASTING LIPID PANEL:  Lab Results   Component Value Date    CHOL 256 (H) 05/28/2022    HDL 50 05/28/2022    TRIG 305 (H) 05/28/2022     LIVER PROFILE:   Recent Labs     05/28/22  1530 05/29/22  0346   AST 42* 30   ALT 45* 36*   BILITOT 0.38 0.26*   ALKPHOS 69 57      MICROBIOLOGY: No results found for: CULTURE    Imaging:    CT HEAD WO CONTRAST    Result Date: 5/28/2022  No acute intracranial abnormality. RECOMMENDATIONS: Unavailable     XR CHEST PORTABLE    Result Date: 5/28/2022  Central pulmonary vascular congestion without overt pulmonary edema. CTA HEAD NECK W CONTRAST    Result Date: 5/28/2022  Partial occlusion in the proximal M2 segment of the right MCA. 90% stenosis in the mid M1 segment of the left MCA. 80% stenosis in the proximal P2 segment of the right PCA. 80% stenosis in the mid P2 segment of the left PCA. 50% stenosis in the mid V2 segment of the left vertebral artery. RECOMMENDATIONS: Unavailable       ASSESSMENT & PLAN      Ischemic stroke ([facility] [address]  Acute left subcortical ischemic infarct with severe left MCA and left PCA stenosis along with diffuse significant intracranial atherosclerotic disease involving right MCA, right PCA Neurology on board. not a candidate for mechanical thrombectomy     Acute hypoxic respiratory failure  Patient desaturating at admission, requiring 2L NC  CXR central vascular congestion. Currently of supplemental O2. Will repeat cxr      Primary hypertension  - on coreg, lisinopril at home. Permissive HTN BP<200    CAD:   severe left MCA and left PCA stenosis along with diffuse significant intracranial atherosclerotic disease involving right MCA, right PCA  Started on aspirin, plavix and lipitor. ASCVD 38% risk CV event, needs high intensity statin.        Mixed hyperlipidemia  Cholesterol 256, HDL 50, , triglycerides 305. BMI 38.  Started on dual antiplatelet Aspirin and plavix. Start on Lipitor 40mg  Daily.        Type 2 diabetes mellitus with hyperglycemia, with long-term current use of insulin (HCC)  Lantus 10 nightly. MDSS. glycemia 174       Acquired hypothyroidism  TSH 14. Resume synthroid 125mcg daily       GERD: resume protonix 40mg daily       CKD stage 3 due to type 2 diabetes mellitus ([facility] [address] baseline Cr 1.1-1.3     Sb Mcdonald MD  Internal Medicine Resident, PGY-1  [facility];  [place], [place]  5/29/2022, 6:01 AM

## 2022-05-29 NOTE — PROGRESS NOTES
[address] [facility]  Speech Language Pathology    Date: 5/29/2022  Patient Name: Kleber Livingston  YOB: 1950   AGE: 81 y.o. MRN: [id number]        Patient Not Available for Speech Therapy     Due to:  [] Testing  [] Hemodialysis  [] Cancelled by RN  [] Surgery   [] Intubation/Sedation/Pain Medication  [] Medical instability  [x] Other:Pt w/ other professionals. ST to continue to follow.       Next scheduled treatment: 5/30  Completed by: Shellie Manuel, SLP

## 2022-05-29 NOTE — PROGRESS NOTES
Physical Therapy  Facility/Department: [address] STEP DOWN  Physical Therapy Initial Assessment    Name: Vishnu Barker  : 1950  MRN: [id number]  Date of Service: 2022    Chief Complaint   Patient presents with    Altered Mental Status     altered mental status appox. 1415 became difficult speech and weakness with facial droop, leaning to left       Discharge Recommendations:    Further therapy recommended at discharge. PT Equipment Recommendations  Equipment Needed: Yes  Other: RW required to safetly attempt amb at this time. Patient Diagnosis(es): The primary encounter diagnosis was Dehydration. Diagnoses of ALPHONSE (acute kidney injury) ([facility] [address] and Cerebrovascular accident (CVA), unspecified mechanism ([address] were also pertinent to this visit. Past Medical History:  has a past medical history of Depression, Diabetes mellitus ([address] Hypertension, and Tremors of nervous system. Past Surgical History:  has a past surgical history that includes Hysterectomy and Cholecystectomy. Assessment   Body Structures, Functions, Activity Limitations Requiring Skilled Therapeutic Intervention: Decreased functional mobility ; Decreased strength;Decreased endurance;Decreased balance  Assessment: Pt grossly CGA for mobility, amb 150' RW CGA. Pt does not use AD baseline. Pt would benefit from continued acute PT to address deficits.   Therapy Prognosis: Good  Decision Making: Medium Complexity  Requires PT Follow-Up: Yes  Activity Tolerance  Activity Tolerance: Patient tolerated treatment well;Patient limited by fatigue;Patient limited by endurance  Activity Tolerance Comments: Pt does not use an AD baseline     Plan   Plan  Plan:  (5-6x/wk)  Current Treatment Recommendations: Strengthening,Balance training,Functional mobility training,Transfer training,Endurance training,Gait training,Stair training,Home exercise program,Safety education & training,Patient/Caregiver education & training,Equipment evaluation, education, & procurement  Safety Devices  Type of Devices: Call light within reach,Gait belt,Nurse notified,Patient at risk for falls,Left in bed,All fall risk precautions in place  Restraints  Restraints Initially in Place: No     Restrictions  Restrictions/Precautions  Restrictions/Precautions: Fall Risk,Up as Tolerated  Required Braces or Orthoses?: No     Subjective   General  Chart Reviewed: Yes  Patient assessed for rehabilitation services?: Yes  Response To Previous Treatment: Not applicable  Family / Caregiver Present: Yes (dtr and gdtr)  Follows Commands: Within Functional Limits  General Comment  Comments: RN and pt agreeable to PT. Pt alert in bed upon arrival. OT co-eval  Subjective  Subjective: Pt denies any pain bur reports chronic numbness in toes d/t neuropathy         Social/Functional History  Social/Functional History  Lives With: Alone  Type of Home: Apartment (1st)  Home Layout: One level  Home Access: Level entry,Elevator (elevator up for laundry)  Bathroom Shower/Tub: Tub/Shower unit  Bathroom Toilet: Standard  Bathroom Equipment: Grab bars in shower,Grab bars around toilet (no chair)  Bathroom Accessibility: Accessible  Home Equipment: Rollator (pull cords in each room)  Has the patient had two or more falls in the past year or any fall with injury in the past year?: Yes (after dizzy upon standing, fall into wall.  no injuries)  ADL Assistance: Independent  Homemaking Assistance: Independent (family often brings families)  Homemaking Responsibilities: Yes  Ambulation Assistance: Independent (rollator for longer distances)  Transfer Assistance: Independent  Active : Yes  Mode of Transportation: Car  Leisure & Hobbies: out to eat  Vision/Hearing  Vision Exceptions: Wears glasses for reading  Hearing: Within functional limits    Cognition   Orientation  Overall Orientation Status: Within Functional Limits  Cognition  Overall Cognitive Status: WFL     Objective     AROM RLE (degrees)  RLE AROM: WFL  AROM LLE (degrees)  LLE AROM : WFL  Strength RLE  Strength RLE: WFL  Strength LLE  Strength LLE: WFL  Strength RUE  Comment: antigravity observed, see OT  Strength LUE  Comment: antigravity observed, see OT        Bed Mobility Training  Bed Mobility Training: Yes  Overall Level of Assistance: Stand-by assistance; Adaptive equipment (pt completed bed mobility with SBA and bed rail use. HOB flat to simulate home environment)  Balance  Sitting: With support (SBA for static and CGA for dynamic sitting/reaching)  Standing: With support (CGA grossly)  Transfer Training  Transfer Training: Yes  Overall Level of Assistance: Contact-guard assistance (pt completed sit > stand with no device use and stand > sit with CGA and RW)  Sit to Stand: Contact-guard assistance  Stand to Sit: Contact-guard assistance; Adaptive equipment  Gait  Overall Level of Assistance: Contact-guard assistance; Adaptive equipment (pt completed functional mobility within hospital room and hallway to simulate home environment with CGA and RW use. Pt attempted functional mobility with no device use however noted to reach out for furniture to steady self.  Pt ed on safety and DME use)  Bed mobility  Supine to Sit: Contact guard assistance  Sit to Supine: Contact guard assistance  Scooting: Contact guard assistance  Transfers  Sit to Stand: Contact guard assistance  Stand to sit: Contact guard assistance  Ambulation  Surface: level tile  Device: Rolling Walker  Assistance: Contact guard assistance  Quality of Gait: slowed, reciprocal, no LOB or buckling, unsteady  Distance: 150'  More Ambulation?: No  Stairs/Curb  Stairs?: No     Balance  Posture: Good  Sitting - Static: Good  Sitting - Dynamic: Good  Standing - Static: Good;-  Standing - Dynamic: Fair;+  Comments: RW used while assessing standing balance             AM-PAC Score  AM-PAC Inpatient Mobility Raw Score : 19 (05/29/22 1441)  AM-PAC Inpatient T-Scale Score : 45.44 (05/29/22 1441)  Mobility Inpatient CMS 0-100% Score: 41.77 (05/29/22 1441)  Mobility Inpatient CMS G-Code Modifier : CK (05/29/22 1441)          Goals  Short Term Goals  Time Frame for Short term goals: 14 visits  Short term goal 1: Pt will be Kaya bed mobility  Short term goal 2: Pt will be Kaya transfers  Short term goal 3: Pt will be Kaya amb 250' RW or least restrictive AD  Short term goal 4: Pt will navigate 2 steps Kaya for safe curb navigation       Education  Patient Education  Education Given To: Patient; Family  Education Provided: Role of Therapy;Plan of Care  Education Method: Demonstration;Verbal  Barriers to Learning: None  Education Outcome: Verbalized understanding;Demonstrated understanding      Therapy Time   Individual Concurrent Group Co-treatment   Time In 0931         Time Out 0953         Minutes 22         Timed Code Treatment Minutes: 9 Minutes       Magnolia Mccall PT

## 2022-05-29 NOTE — PROGRESS NOTES
Occupational Therapy  Facility/Department: [address] STEP DOWN  Occupational Therapy Initial Assessment    Name: Vishnu Barker  : 1950  MRN: [id number]  Date of Service: 2022    Discharge Recommendations:  Patient would benefit from continued therapy after discharge  OT Equipment Recommendations  Equipment Needed: Yes  Mobility Devices: [place]; ADL Assistive Devices  Walker: Rolling  ADL Assistive Devices: Transfer Tub Bench;Reacher;Long-handled Shoe [place];Long-handled Sponge       Patient Diagnosis(es): The primary encounter diagnosis was Dehydration. Diagnoses of ALPHONSE (acute kidney injury) ([facility] [address] and Cerebrovascular accident (CVA), unspecified mechanism ([facility] [address] were also pertinent to this visit. Past Medical History:  has a past medical history of Depression, Diabetes mellitus ([facility] [address] Hypertension, and Tremors of nervous system. Past Surgical History:  has a past surgical history that includes Hysterectomy and Cholecystectomy. Assessment   Performance deficits / Impairments: Decreased functional mobility ; Decreased ADL status; Decreased endurance;Decreased balance;Decreased high-level IADLs  Assessment: Pt agreeable to OT eval this date. Pt demonstrates slight balance and endurance deficits that limit independent performance of ADLs/IADLs and functional mobility performance.  Pt will continue to benefit from OT services to improve safety and independence with ADLs/IADLs and functional transfers/mobility  Prognosis: Good  Decision Making: Medium Complexity  REQUIRES OT FOLLOW-UP: Yes  Activity Tolerance  Activity Tolerance: Patient Tolerated treatment well        Plan   Plan  Times per Week: 3-4 x/wk  Current Treatment Recommendations: Balance training,Functional mobility training,Endurance training,Safety education & training,Patient/Caregiver education & training,Equipment evaluation, education, & procurement,Home management training,Self-Care / ADL Restrictions  Restrictions/Precautions  Restrictions/Precautions: Fall Risk,Up as Tolerated  Required Braces or Orthoses?: No    Subjective   General  Patient assessed for rehabilitation services?: Yes  Family / Caregiver Present: Yes (daughter and granddaughter present throughout evaluation)  General Comment  Comments: RN ok'd pt for OT eval this date. Pt agreeable to session and pleasant/cooperative throughout. Pt denies pain     Social/Functional History  Social/Functional History  Lives With: Alone  Type of Home: Apartment (1st)  Home Layout: One level  Home Access: Level entry,Elevator (elevator up for laundry)  Bathroom Shower/Tub: Tub/Shower unit  Bathroom Toilet: Standard  Bathroom Equipment: Grab bars in shower,Grab bars around toilet (no chair)  Bathroom Accessibility: Accessible  Home Equipment: Rollator (pull cords in each room)  Has the patient had two or more falls in the past year or any fall with injury in the past year?: Yes (after dizzy upon standing, fall into wall. no injuries)  ADL Assistance: Independent  Homemaking Assistance: Independent (family often brings families)  Homemaking Responsibilities: Yes  Ambulation Assistance: Independent (rollator for longer distances)  Transfer Assistance: Independent  Active : Yes  Mode of Transportation: Car  Leisure & Hobbies: out to eat       Objective   Vision Exceptions: Wears glasses for reading  Hearing: Within functional limits         Safety Devices  Type of Devices: Call light within reach;Gait belt;Nurse notified; Left in bed  Restraints  Restraints Initially in Place: No     Bed Mobility Training  Bed Mobility Training: Yes  Overall Level of Assistance: Stand-by assistance; Adaptive equipment (pt completed bed mobility with SBA and bed rail use.  HOB flat to simulate home environment)  Balance  Sitting: With support (SBA for static and CGA for dynamic sitting/reaching)  Standing: With support (CGA grossly)  Transfer Training  Transfer Training: Yes  Overall Level of Assistance: Contact-guard assistance (pt completed sit > stand with no device use and stand > sit with CGA and RW)  Sit to Stand: Contact-guard assistance  Stand to Sit: Contact-guard assistance; Adaptive equipment  Gait  Overall Level of Assistance: Contact-guard assistance; Adaptive equipment (pt completed functional mobility within hospital room and hallway to simulate home environment with CGA and RW use. Pt attempted functional mobility with no device use however noted to reach out for furniture to steady self. Pt ed on safety and DME use)    AROM: Within functional limits  Strength: Within functional limits (grossly 4+/5)  Coordination: Within functional limits  Tone: Normal  Sensation: Intact     ADL  Feeding: Modified independent ;Setup  Grooming: Modified independent ;Setup  UE Bathing: Stand by assistance;Setup  LE Bathing: Contact guard assistance;Setup; Increased time to complete  UE Dressing: Stand by assistance;Setup (pt donned gown to back with SBA for proper orientation)  LE Dressing: Contact guard assistance;Setup; Increased time to complete (pt simulated LB dressing task while  in figure 4 seated position; CGA required throughout)  Toileting: Contact guard assistance;Setup                Cognition  Overall Cognitive Status: [facility]                 Education Provided Comments: Pt ed on OT role, OT POC, safety awareness, transfer training, DME use, and importance of continued OT.  Pt verbalized good understanding  LUE AROM (degrees)  LUE AROM : WFL  Left Hand AROM (degrees)  Left Hand AROM: WFL  RUE AROM (degrees)  RUE AROM : WFL  Right Hand AROM (degrees)  Right Hand AROM: WFL       Hand Dominance  Hand Dominance: Right            AM-PAC Score  AM-[facility] Inpatient Daily Activity Raw Score: 20 (05/29/22 1434)  AM-PAC Inpatient ADL T-Scale Score : 42.03 (05/29/22 1434)  ADL Inpatient CMS 0-100% Score: 38.32 (05/29/22 1434)  ADL Inpatient CMS G-Code Modifier : Jovita Jones (05/29/22 1434)    Goals  Short Term Goals  Time Frame for Short term goals: By discharge, pt will:  Short Term Goal 1: Demo Mod I for functional transfers and functional mobility with use of LRD PRN for ADL/IADL participation  Short Term Goal 2: Demo Mod I for all ADLs with AE use PRN  Short Term Goal 3: Demo 10+ min dynamic standing and reaching outside GAYLE with unilateral/bilateral hand release and SUP for engagement in meaningful occupations  Short Term Goal 4:  Increase activity tolerance to 30+ min for participation in functional activities       Therapy Time   Individual Concurrent Group Co-treatment   Time In [facility] 51         Minutes 23         Timed Code Treatment Minutes: 1200 [facility] [place], OTR/L

## 2022-05-30 ENCOUNTER — APPOINTMENT (OUTPATIENT)
Dept: GENERAL RADIOLOGY | Age: 72
DRG: 064 | End: 2022-05-30
Payer: MEDICARE

## 2022-05-30 VITALS
BODY MASS INDEX: 38.64 KG/M2 | HEIGHT: 62 IN | HEART RATE: 81 BPM | RESPIRATION RATE: 18 BRPM | WEIGHT: 210 LBS | TEMPERATURE: 98.8 F | SYSTOLIC BLOOD PRESSURE: 117 MMHG | OXYGEN SATURATION: 95 % | DIASTOLIC BLOOD PRESSURE: 64 MMHG

## 2022-05-30 LAB
ANION GAP SERPL CALCULATED.3IONS-SCNC: 13 MMOL/L (ref 9–17)
BUN BLDV-MCNC: 18 MG/DL (ref 8–23)
CALCIUM SERPL-MCNC: 8.8 MG/DL (ref 8.6–10.4)
CHLORIDE BLD-SCNC: 104 MMOL/L (ref 98–107)
CO2: 20 MMOL/L (ref 20–31)
CREAT SERPL-MCNC: 1.06 MG/DL (ref 0.5–0.9)
CULTURE: ABNORMAL
GFR AFRICAN AMERICAN: >60 ML/MIN
GFR NON-AFRICAN AMERICAN: 51 ML/MIN
GFR SERPL CREATININE-BSD FRML MDRD: ABNORMAL ML/MIN/{1.73_M2}
GLUCOSE BLD-MCNC: 172 MG/DL (ref 70–99)
GLUCOSE BLD-MCNC: 186 MG/DL (ref 65–105)
GLUCOSE BLD-MCNC: 204 MG/DL (ref 65–105)
POTASSIUM SERPL-SCNC: 4.1 MMOL/L (ref 3.7–5.3)
SODIUM BLD-SCNC: 137 MMOL/L (ref 135–144)
SPECIMEN DESCRIPTION: ABNORMAL
TROPONIN, HIGH SENSITIVITY: 16 NG/L (ref 0–14)
TROPONIN, HIGH SENSITIVITY: 17 NG/L (ref 0–14)

## 2022-05-30 PROCEDURE — 92523 SPEECH SOUND LANG COMPREHEN: CPT

## 2022-05-30 PROCEDURE — 6370000000 HC RX 637 (ALT 250 FOR IP): Performed by: STUDENT IN AN ORGANIZED HEALTH CARE EDUCATION/TRAINING PROGRAM

## 2022-05-30 PROCEDURE — 84484 ASSAY OF TROPONIN QUANT: CPT

## 2022-05-30 PROCEDURE — 6360000002 HC RX W HCPCS: Performed by: STUDENT IN AN ORGANIZED HEALTH CARE EDUCATION/TRAINING PROGRAM

## 2022-05-30 PROCEDURE — 6370000000 HC RX 637 (ALT 250 FOR IP)

## 2022-05-30 PROCEDURE — 80048 BASIC METABOLIC PNL TOTAL CA: CPT

## 2022-05-30 PROCEDURE — 6360000002 HC RX W HCPCS: Performed by: FAMILY MEDICINE

## 2022-05-30 PROCEDURE — 99239 HOSP IP/OBS DSCHRG MGMT >30: CPT | Performed by: PSYCHIATRY & NEUROLOGY

## 2022-05-30 PROCEDURE — 71045 X-RAY EXAM CHEST 1 VIEW: CPT

## 2022-05-30 PROCEDURE — 2500000003 HC RX 250 WO HCPCS: Performed by: STUDENT IN AN ORGANIZED HEALTH CARE EDUCATION/TRAINING PROGRAM

## 2022-05-30 PROCEDURE — 6370000000 HC RX 637 (ALT 250 FOR IP): Performed by: FAMILY MEDICINE

## 2022-05-30 PROCEDURE — 82947 ASSAY GLUCOSE BLOOD QUANT: CPT

## 2022-05-30 PROCEDURE — 99232 SBSQ HOSP IP/OBS MODERATE 35: CPT | Performed by: INTERNAL MEDICINE

## 2022-05-30 PROCEDURE — 2580000003 HC RX 258: Performed by: FAMILY MEDICINE

## 2022-05-30 PROCEDURE — 36415 COLL VENOUS BLD VENIPUNCTURE: CPT

## 2022-05-30 RX ORDER — LISINOPRIL 20 MG/1
20 TABLET ORAL DAILY
Status: DISCONTINUED | OUTPATIENT
Start: 2022-05-30 | End: 2022-05-30 | Stop reason: HOSPADM

## 2022-05-30 RX ORDER — ASPIRIN 81 MG/1
81 TABLET, CHEWABLE ORAL DAILY
Qty: 30 TABLET | Refills: 3 | Status: SHIPPED | OUTPATIENT
Start: 2022-05-30

## 2022-05-30 RX ORDER — CLOPIDOGREL BISULFATE 75 MG/1
75 TABLET ORAL DAILY
Qty: 30 TABLET | Refills: 2 | Status: SHIPPED | OUTPATIENT
Start: 2022-05-30

## 2022-05-30 RX ORDER — HYDRALAZINE HYDROCHLORIDE 20 MG/ML
5 INJECTION INTRAMUSCULAR; INTRAVENOUS ONCE
Status: COMPLETED | OUTPATIENT
Start: 2022-05-30 | End: 2022-05-30

## 2022-05-30 RX ORDER — LISINOPRIL 10 MG/1
10 TABLET ORAL DAILY
Status: DISCONTINUED | OUTPATIENT
Start: 2022-05-30 | End: 2022-05-30

## 2022-05-30 RX ORDER — CEPHALEXIN 500 MG/1
500 CAPSULE ORAL 2 TIMES DAILY
Qty: 10 CAPSULE | Refills: 0 | Status: SHIPPED | OUTPATIENT
Start: 2022-05-30 | End: 2022-06-04

## 2022-05-30 RX ORDER — ROSUVASTATIN CALCIUM 40 MG/1
40 TABLET, COATED ORAL NIGHTLY
Qty: 30 TABLET | Refills: 3 | Status: SHIPPED | OUTPATIENT
Start: 2022-05-30

## 2022-05-30 RX ORDER — EPINEPHRINE 1 MG/ML
0.5 INJECTION, SOLUTION, CONCENTRATE INTRAVENOUS ONCE AS NEEDED
Status: DISCONTINUED | OUTPATIENT
Start: 2022-05-30 | End: 2022-05-30 | Stop reason: HOSPADM

## 2022-05-30 RX ORDER — KETOROLAC TROMETHAMINE 30 MG/ML
15 INJECTION, SOLUTION INTRAMUSCULAR; INTRAVENOUS ONCE
Status: COMPLETED | OUTPATIENT
Start: 2022-05-30 | End: 2022-05-30

## 2022-05-30 RX ORDER — LABETALOL HYDROCHLORIDE 5 MG/ML
5 INJECTION, SOLUTION INTRAVENOUS ONCE
Status: COMPLETED | OUTPATIENT
Start: 2022-05-30 | End: 2022-05-30

## 2022-05-30 RX ORDER — OXYCODONE HYDROCHLORIDE 5 MG/1
5 TABLET ORAL ONCE
Status: COMPLETED | OUTPATIENT
Start: 2022-05-30 | End: 2022-05-30

## 2022-05-30 RX ORDER — LABETALOL HYDROCHLORIDE 5 MG/ML
10 INJECTION, SOLUTION INTRAVENOUS ONCE
Status: COMPLETED | OUTPATIENT
Start: 2022-05-30 | End: 2022-05-30

## 2022-05-30 RX ORDER — LISINOPRIL 20 MG/1
20 TABLET ORAL DAILY
Qty: 30 TABLET | Refills: 3 | Status: SHIPPED | OUTPATIENT
Start: 2022-05-31

## 2022-05-30 RX ADMIN — ASPIRIN 81 MG: 81 TABLET, CHEWABLE ORAL at 08:39

## 2022-05-30 RX ADMIN — LISINOPRIL 20 MG: 20 TABLET ORAL at 08:38

## 2022-05-30 RX ADMIN — OXYCODONE 5 MG: 5 TABLET ORAL at 09:29

## 2022-05-30 RX ADMIN — Medication 10 MG: at 05:01

## 2022-05-30 RX ADMIN — ENOXAPARIN SODIUM 40 MG: 100 INJECTION SUBCUTANEOUS at 08:39

## 2022-05-30 RX ADMIN — PANTOPRAZOLE SODIUM 40 MG: 40 TABLET, DELAYED RELEASE ORAL at 08:39

## 2022-05-30 RX ADMIN — CARVEDILOL 25 MG: 25 TABLET, FILM COATED ORAL at 08:39

## 2022-05-30 RX ADMIN — HYDRALAZINE HYDROCHLORIDE 5 MG: 20 INJECTION, SOLUTION INTRAMUSCULAR; INTRAVENOUS at 03:23

## 2022-05-30 RX ADMIN — KETOROLAC TROMETHAMINE 15 MG: 30 INJECTION, SOLUTION INTRAMUSCULAR at 05:54

## 2022-05-30 RX ADMIN — INSULIN LISPRO 4 UNITS: 100 INJECTION, SOLUTION INTRAVENOUS; SUBCUTANEOUS at 11:27

## 2022-05-30 RX ADMIN — INSULIN LISPRO 2 UNITS: 100 INJECTION, SOLUTION INTRAVENOUS; SUBCUTANEOUS at 08:39

## 2022-05-30 RX ADMIN — CLOPIDOGREL 75 MG: 75 TABLET, FILM COATED ORAL at 08:39

## 2022-05-30 RX ADMIN — SERTRALINE 100 MG: 50 TABLET, FILM COATED ORAL at 08:39

## 2022-05-30 RX ADMIN — LEVOTHYROXINE SODIUM 125 MCG: 125 TABLET ORAL at 08:39

## 2022-05-30 RX ADMIN — KETOROLAC TROMETHAMINE 15 MG: 30 INJECTION, SOLUTION INTRAMUSCULAR at 04:53

## 2022-05-30 RX ADMIN — SODIUM CHLORIDE: 9 INJECTION, SOLUTION INTRAVENOUS at 05:04

## 2022-05-30 RX ADMIN — Medication 5 MG: at 00:52

## 2022-05-30 ASSESSMENT — PAIN - FUNCTIONAL ASSESSMENT
PAIN_FUNCTIONAL_ASSESSMENT: ACTIVITIES ARE NOT PREVENTED

## 2022-05-30 ASSESSMENT — PAIN DESCRIPTION - ORIENTATION: ORIENTATION: RIGHT;LEFT

## 2022-05-30 ASSESSMENT — PAIN DESCRIPTION - FREQUENCY
FREQUENCY: CONTINUOUS

## 2022-05-30 ASSESSMENT — PAIN SCALES - GENERAL
PAINLEVEL_OUTOF10: 7
PAINLEVEL_OUTOF10: 4
PAINLEVEL_OUTOF10: 8
PAINLEVEL_OUTOF10: 8
PAINLEVEL_OUTOF10: 5
PAINLEVEL_OUTOF10: 4

## 2022-05-30 ASSESSMENT — PAIN DESCRIPTION - ONSET
ONSET: ON-GOING
ONSET: ON-GOING

## 2022-05-30 ASSESSMENT — PAIN DESCRIPTION - DESCRIPTORS
DESCRIPTORS: ACHING;DULL;PRESSURE
DESCRIPTORS: ACHING;DULL;PRESSURE
DESCRIPTORS: DULL;PRESSURE

## 2022-05-30 ASSESSMENT — PAIN DESCRIPTION - LOCATION
LOCATION: BACK
LOCATION: CHEST;BACK
LOCATION: CHEST;BACK

## 2022-05-30 ASSESSMENT — PAIN DESCRIPTION - PAIN TYPE
TYPE: ACUTE PAIN;CHRONIC PAIN
TYPE: ACUTE PAIN;CHRONIC PAIN

## 2022-05-30 ASSESSMENT — PAIN SCALES - WONG BAKER: WONGBAKER_NUMERICALRESPONSE: 0

## 2022-05-30 NOTE — PROGRESS NOTES
Speech Language Pathology  Facility/Department: [facility] 1C STEP DOWN  Initial Speech/Language/Cognitive Assessment    NAME: Christiano Arriaga  : 1950   MRN: [id number]  ADMISSION DATE: 2022  ADMITTING DIAGNOSIS: has Primary hypertension; Mixed hyperlipidemia; Type 2 diabetes mellitus with hyperglycemia, with long-term current use of insulin ([address] Ischemic stroke ([address] Other specified hypothyroidism; Cerebrovascular accident (CVA) ([address] Dehydration; CKD stage 3 due to type 2 diabetes mellitus ([address] Chronic diarrhea; Acute cystitis; ALPHONSE (acute kidney injury) ([address] Facial droop; and Intracranial vascular stenosis on their problem list.      Date of Eval: 2022   Evaluating Therapist: KARL Bellamy    RECENT RESULTS  CT OF HEAD/MRI:   There are subtle punctate foci of restricted diffusion with increased signal   on T2/FLAIR sequence involving the left periatrial white matter and left   inferior parietal lobule subcortical white matter suggestive of acute   infarction.       There is no intracranial hemorrhage, or intracranial mass lesion. No mass   effect, midline shift, or extra-axial collection is noted.       There are mild nonspecific foci of periventricular and subcortical cerebral   white matter T2/FLAIR hyperintensity, most likely representing chronic   microangiopathic disease in this age group.  Old lacunar infarction of right   caudate head.  The brain parenchyma is otherwise normal. The pituitary gland   is normal in appearance. The cerebellar tonsils are in normal position.       The ventricles, sulci, and cisterns are prominent suggestive of generalized   volume loss. The intracranial flow voids are preserved.       The globes and orbits are within normal limits.  The visualized extracranial   structures including paranasal sinuses and mastoid air cells are unremarkable.       IMPRESSION   Subtle punctate foci of acute infarction involving the left periatrial white   matter and 3-5X/week      Recommendations:  Requires SLP Intervention: Yes     D/C Recommendations: Ongoing speech therapy is recommended during this hospitalization; Ongoing speech therapy is recommended at next level of care       Plan:   Goals:  Short-term Goals  Goal 1: Recall 3-5 units with and without distraction with 90% accuracy  Goal 2: Provide comp. recall strategies to aid in recall  Goal 3: Complete task insight and thought flexibility tasks with 90% accuracy   Patient/family involved in developing goals and treatment plan: yes     Subjective:   Previous level of function and limitations: independent  General  Chart Reviewed: Yes  Social/Functional History  Lives With: Alone  Vision  Vision: Impaired  Hearing  Hearing: Within functional limits            Cognition:      Orientation  Overall Orientation Status: Within Normal Limits  Attention  Attention: Within Functional Limits  Memory  Memory: Exceptions to [facility] (3/3,2/5 immediate)  Short-term Memory: Mild (2/3,3/3)  Problem Solving  Problem Solving: Within Functional Limits  Abstract Reasoning  Abstract Reasoning: Within Functional Limits  Safety/Judgment  Safety/Judgment: Exceptions to [facility]  Insight: WFL (2/3)  Flexibility of Thought: WFL (2/3)    Prognosis:  Speech Therapy Prognosis  Prognosis: Good  Individuals consulted  Consulted and agree with results and recommendations: Patient    Education:  Patient Education: yes  Patient Education Response: Verbalizes understanding          Therapy Time:   Individual Concurrent Group Co-treatment   Time In  950         Time Out  1001         Minutes  [address]  5/30/2022 10:01 AM

## 2022-05-30 NOTE — PROGRESS NOTES
bedtime. 4/27/22   Historical Provider, MD   glimepiride (AMARYL) 4 MG tablet in the morning and at bedtime  5/28/22   Historical Provider, MD   lisinopril (PRINIVIL;ZESTRIL) 10 MG tablet  5/18/22   Historical Provider, MD   JANUVIA 50 MG tablet  3/15/22   Historical Provider, MD   carvedilol (COREG) 25 MG tablet 2 times daily (with meals)  3/15/22   Historical Provider, MD   sertraline (ZOLOFT) 100 MG tablet  3/15/22   Historical Provider, MD   ibuprofen (ADVIL;MOTRIN) 800 MG tablet  3/15/22   Historical Provider, MD   cyclobenzaprine (FLEXERIL) 10 mg tablet nightly as needed  3/15/22   Historical Provider, MD   loperamide (LOPERAMIDE A-D) 2 MG tablet Take 2 tablets by mouth 2 times daily as needed    Historical Provider, MD        Allergies:     Morphine, Metformin, Sulfa antibiotics, and Adhesive tape    Social History:     Tobacco:    reports that she has never smoked. She has never used smokeless tobacco.  Alcohol:      reports no history of alcohol use. Drug Use:  reports no history of drug use. Family History:     No family history on file. Review of Systems:     ROS:  Constitutional  Negative for fever and chills    HEENT  Negative for ear discharge, ear pain, nosebleed    Eyes  Negative for photophobia, pain and discharge    Respiratory  Negative for hemoptysis and sputum    Cardiovascular  Negative for orthopnea, claudication and PND    Gastrointestinal  Negative for abdominal pain, diarrhea, blood in stool    Musculoskeletal  Negative for joint pain, negative for myalgia    Neurology Negative for seizures, loss of consciousness   Skin  Negative for rash or itching    Endo/heme/allergies  Negative for polydipsia, environmental allergy    Psychiatric/behavioral  Negative for suicidal ideation.  Patient is not anxious        Physical Exam:   BP (!) 179/70   Pulse 79   Temp 98 °F (36.7 °C) (Oral)   Resp 16   Ht 5' 2\" (1.575 m)   Wt 210 lb (95.3 kg)   SpO2 97%   BMI 38.41 kg/m²   Temp (24hrs), Av.1 °F (36.7 °C), Min:97.8 °F (36.6 °C), Max:98.3 °F (36.8 °C)    Recent Labs     22  1221 22  1738 22  2123 22  0814   POCGLU 168* 225* 247* 186*       Intake/Output Summary (Last 24 hours) at 2022 0915  Last data filed at 2022 1745  Gross per 24 hour   Intake 840 ml   Output --   Net 840 ml         NEUROLOGIC EXAMINATION  GENERAL  Appears comfortable and in no distress   HEENT  NC/ AT   NECK  Supple and no bruits heard   MENTAL STATUS:  Alert, oriented, intact memory, no confusion, normal speech, normal language, no hallucination or delusion   CRANIAL NERVES: II     -      Visual fields intact to confrontation  III,IV,VI -  EOMs full, no afferent defect, no DARRIUS, no ptosis  V     -     Normal facial sensation  VII    -     subtle left facial droop  VIII   -     Intact hearing  IX,X -     Symmetrical palate  XI    -     Symmetrical shoulder shrug  XII   -     Midline tongue, no atrophy    MOTOR FUNCTION:  significant for good strength of grade 5/5 in bilateral proximal and distal muscle groups of both upper and lower extremities with normal bulk, normal tone and no involuntary movements, no tremor   SENSORY FUNCTION:  Decree sensation in the distal upper and lower extremities   CEREBELLAR FUNCTION:  Intact fine motor control over upper limbs   REFLEX FUNCTION:  Symmetric, no perverted reflex, no Babinski sign   STATION and GAIT  Not tested       Investigations:      Laboratory Testing:  Recent Results (from the past 24 hour(s))   POC Glucose Fingerstick    Collection Time: 22 12:21 PM   Result Value Ref Range    POC Glucose 168 (H) 65 - 105 mg/dL   EKG 12 Lead    Collection Time: 22  4:49 PM   Result Value Ref Range    Ventricular Rate 78 BPM    Atrial Rate 78 BPM    P-R Interval 146 ms    QRS Duration 80 ms    Q-T Interval 380 ms    QTc Calculation (Bazett) 433 ms    P Axis 48 degrees    R Axis 4 degrees    T Axis 127 degrees   POC Glucose Fingerstick    Collection Time: 05/29/22  5:38 PM   Result Value Ref Range    POC Glucose 225 (H) 65 - 105 mg/dL   POC Glucose Fingerstick    Collection Time: 05/29/22  9:23 PM   Result Value Ref Range    POC Glucose 247 (H) 65 - 105 mg/dL   Troponin    Collection Time: 05/30/22  3:36 AM   Result Value Ref Range    Troponin, High Sensitivity 16 (H) 0 - 14 ng/L   Basic Metabolic Panel w/ Reflex to MG    Collection Time: 05/30/22  7:59 AM   Result Value Ref Range    Glucose 172 (H) 70 - 99 mg/dL    BUN 18 8 - 23 mg/dL    CREATININE 1.06 (H) 0.50 - 0.90 mg/dL    Calcium 8.8 8.6 - 10.4 mg/dL    Sodium 137 135 - 144 mmol/L    Potassium 4.1 3.7 - 5.3 mmol/L    Chloride 104 98 - 107 mmol/L    CO2 20 20 - 31 mmol/L    Anion Gap 13 9 - 17 mmol/L    GFR Non-African American 51 (L) >60 mL/min    GFR African American >60 >60 mL/min    GFR Comment         Troponin    Collection Time: 05/30/22  7:59 AM   Result Value Ref Range    Troponin, High Sensitivity 17 (H) 0 - 14 ng/L   POC Glucose Fingerstick    Collection Time: 05/30/22  8:14 AM   Result Value Ref Range    POC Glucose 186 (H) 65 - 105 mg/dL         Assessment :      Primary Problem  Ischemic stroke [facility])    Active Hospital Problems    Diagnosis Date Noted    ALPHONSE (acute kidney injury) ([facility] [N17.9]      Priority: Medium    Facial droop [R29.810]      Priority: Medium    Intracranial vascular stenosis [I67.9]      Priority: Medium    Primary hypertension [I10] 05/28/2022     Priority: Medium    Mixed hyperlipidemia [E78.2] 05/28/2022     Priority: Medium    Type 2 diabetes mellitus with hyperglycemia, with long-term current use of insulin ([facility]) [E11.65, Z79.4] 05/28/2022     Priority: Medium    Ischemic stroke ([facility] [I63.9] 05/28/2022     Priority: Medium    Other specified hypothyroidism [E03.8] 05/28/2022     Priority: Medium    Cerebrovascular accident (CVA) ([facility] [I63.9] 05/28/2022     Priority: Medium    CKD stage 3 due to type 2 diabetes mellitus ([facility] [R05.94, N18.30] 05/28/2022 Priority: Medium    Chronic diarrhea [K52.9] 05/28/2022     Priority: Medium    Acute cystitis [N30.00] 05/28/2022     Priority: Medium       Plan:     Patient was admitted to the hospital due to dysarthria and left-sided weakness. Resolved. MRI of the brain showed left temporoparietal stroke. CTA with diffuse iCAD disease. Loaded with dual antiplatelets. To continue aspirin & Plavix as maintenance dose. Crestor 40 mg. LDL is 145. Hemoglobin A1c is 11. Echo is pending. PT/OT/SLP  Lovenox for DVT prophylaxis. UA is positive, on Rocephin. High TSH, on replacement. Diabetes mellitus type 2, on insulin replacement. We will start to normalize blood pressure. Coreg 25 mg twice daily, lisinopril 20 mg were restarted. To keep systolic blood pressure less than 160. Internal medicine was consulted, appreciate recommendations. Follow-up further recommendations after discussing the case with attending  The plan was discussed with the patient, patient's family and the medical staff. Consultations:   IP CONSULT TO STROKE TEAM  IP CONSULT TO INTERNAL MEDICINE  IP CONSULT TO DIABETES EDUCATOR  IP CONSULT TO DIABETES EDUCATOR    Patient is admitted as inpatient status because of co-morbidities listed above, severity of signs and symptoms as outlined, requirement for current medical therapies and most importantly because of direct risk to patient if care not provided in a hospital setting.     Morro Liu MD  5/30/2022  9:15 AM    Copy sent to Dr. Hannah Cano MD

## 2022-05-30 NOTE — PROGRESS NOTES
[facility]  Internal Medicine Teaching Residency Program  Inpatient Daily Progress Note  ______________________________________________________________________________    Patient: Violeta Stovall  YOB: 1950   LZA:[id number]    Acct: [de-identified]     Room: [address] [place] [address]  Admit date: 5/28/2022  Today's date: 05/30/22  Number of days in the hospital: 2    SUBJECTIVE   Admitting Diagnosis: Ischemic stroke ([facility]  CC: slurred speech  Pt examined at bedside. Chart & results reviewed. Complaining of lower chest and back pain. Patient had hallucinations and rash with morphine about 20 years ago. Discussed risk and interaction with other opioids. She is willing to try Roxicodone. Chest pain with elevated BP sbp in the 200s. Had labetalol. /109, pulse 71. Antihypertensives coreg and lisinopril resumed. Glycemia 186. Increased lantus to 15 nightly    ROS:  Constitutional:  negative for chills, fevers, sweats  Respiratory:  negative for cough, dyspnea on exertion, hemoptysis, shortness of breath, wheezing  Cardiovascular:  negative for chest pain, chest pressure/discomfort, lower extremity edema, palpitations  Gastrointestinal:  negative for abdominal pain, constipation, diarrhea, nausea, vomiting  Neurological:  negative for dizziness, headache  BRIEF HISTORY     The patient is a pleasant 81 y.o. female presents with a chief complaint of slurred speech. 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 Patient was at ED visiting relative, family noticed slurred speech and left sided leaning. No loss of consciousness, fall, incontinence.    At ED, pt alert and oriented x 3, on 3L NC, /73, but as high as 198/100, pulse 80, NIH stroke scale 1 for minor facial palsy, Significant labs: Cr 1.32, glycemia 380, lft unremarkable, b-hyfdroxy 0.29, cbc normal, INR 1, VBG normal   Imaging revealed  Partial occlusion in the proximal M2 segment of the right MCA. 90% stenosis in the mid M1 segment of the left MCA. 80% stenosis in the proximal P2 segment of the right PCA. 80% stenosis in the mid P2 segment of the left PCA. 50% stenosis in the mid V2 segment of the left vertebral artery. MRI brain Subtle punctate foci of acute infarction involving the left periatrial white matter and left inferior parietal lobule subcortical white matter. Old lacunar infarction right caudate head. Mild chronic microangiopathic ischemic disease. Mild generalized volume loss  Patient started on IVF, aspirin, plavix, rosuvastatin. Neurology on board    OBJECTIVE     Vital Signs:  BP (!) 150/109   Pulse 71   Temp 98.3 °F (36.8 °C) (Oral)   Resp 16   Ht 5' 2\" (1.575 m)   Wt 210 lb (95.3 kg)   SpO2 97%   BMI 38.41 kg/m²     Temp (24hrs), Av.1 °F (36.7 °C), Min:97.8 °F (36.6 °C), Max:98.3 °F (36.8 °C)    In: 850   Out: -     Physical Exam:  Constitutional: This is a well developed, well nourished, 35-39.9 - Obesity Grade II 81y.o. year old female who is alert, oriented, cooperative and in no apparent distress. Head:Left facial palsy. normocephalic and atraumatic. EENT:  PERRLA. No conjunctival injections. Septum was midline, mucosa was without erythema, exudates or cobblestoning. No thrush was noted. Neck: Supple without thyromegaly. No elevated JVP. Trachea was midline. Respiratory: Chest was symmetrical without dullness to percussion. Breath sounds bilaterally were clear to auscultation. There were no wheezes, rhonchi or rales. There is no intercostal retraction or use of accessory muscles. No egophony noted. Cardiovascular: Regular without murmur, clicks, gallops or rubs. Abdomen: Slightly rounded and soft without organomegaly. No rebound, rigidity or guarding was appreciated. Lymphatic: No lymphadenopathy. Musculoskeletal: Normal curvature of the spine. No gross muscle weakness. Extremities:  No lower extremity edema, ulcerations, tenderness, varicosities or erythema. Muscle size, tone and strength are normal.  No involuntary movements are noted. Skin:  Warm and dry. Good color, turgor and pigmentation. No lesions or scars.   No cyanosis or clubbing  Neurological/Psychiatric: The patient's general behavior, level of consciousness, thought content and emotional status is normal.             Medications:  Scheduled Medications:    lisinopril  10 mg Oral Daily    aspirin  81 mg Oral Daily    insulin glargine  15 Units SubCUTAneous Nightly    levothyroxine  125 mcg Oral Daily    carvedilol  25 mg Oral BID WC    sodium chloride flush  10 mL IntraVENous 2 times per day    enoxaparin  40 mg SubCUTAneous Daily    clopidogrel  75 mg Oral Daily    sertraline  100 mg Oral Daily    pantoprazole  40 mg Oral Daily    insulin lispro  0-12 Units SubCUTAneous TID WC    insulin lispro  0-6 Units SubCUTAneous Nightly    cefTRIAXone (ROCEPHIN) IV  1,000 mg IntraVENous Q24H    rosuvastatin  40 mg Oral Nightly     Continuous Infusions:    sodium chloride      sodium chloride 75 mL/hr at 05/30/22 0504    dextrose       PRN Medicationssodium chloride flush, 10 mL, PRN  sodium chloride, , PRN  ondansetron, 4 mg, Q8H PRN   Or  ondansetron, 4 mg, Q6H PRN  magnesium hydroxide, 30 mL, Daily PRN  perflutren lipid microspheres, 1.5 mL, ONCE PRN  glucose, 4 tablet, PRN  dextrose bolus, 125 mL, PRN   Or  dextrose bolus, 250 mL, PRN  glucagon (rDNA), 1 mg, PRN  dextrose, 100 mL/hr, PRN  loperamide, 2 mg, TID PRN        Diagnostic Labs:  CBC:   Recent Labs     05/28/22  1530 05/29/22  0346   WBC 10.9 10.5   RBC 5.07 4.45   HGB 15.1 13.5   HCT 44.4 38.8   MCV 87.6 87.2   RDW 13.3 13.2    144     BMP:   Recent Labs     05/28/22  1526 05/28/22  1530 05/29/22  0346   NA  --  131* 132*   K --  4.5 3.5*   CL  --  93* 97*   CO2  --  22 23   BUN  --  23 22   CREATININE 1.56* 1.32* 1.19*     BNP: No results for input(s): BNP in the last 72 hours. PT/INR:   Recent Labs     05/28/22  1530   PROTIME 10.4   INR 1.0     APTT:   Recent Labs     05/28/22  1530   APTT 22.7     CARDIAC ENZYMES: No results for input(s): CKMB, CKMBINDEX, TROPONINI in the last 72 hours. Invalid input(s): CKTOTAL;3  FASTING LIPID PANEL:  Lab Results   Component Value Date    CHOL 256 (H) 05/28/2022    HDL 50 05/28/2022    TRIG 305 (H) 05/28/2022     LIVER PROFILE:   Recent Labs     05/28/22  1530 05/29/22  0346   AST 42* 30   ALT 45* 36*   BILITOT 0.38 0.26*   ALKPHOS 69 57      MICROBIOLOGY:   Lab Results   Component Value Date/Time    CULTURE ESCHERICHIA COLI >540861 CFU/ML (A) 05/28/2022 07:01 PM       Imaging:    CT HEAD WO CONTRAST    Result Date: 5/28/2022  No acute intracranial abnormality. RECOMMENDATIONS: Unavailable     XR CHEST PORTABLE    Result Date: 5/28/2022  Central pulmonary vascular congestion without overt pulmonary edema. CTA HEAD NECK W CONTRAST    Result Date: 5/28/2022  Partial occlusion in the proximal M2 segment of the right MCA. 90% stenosis in the mid M1 segment of the left MCA. 80% stenosis in the proximal P2 segment of the right PCA. 80% stenosis in the mid P2 segment of the left PCA. 50% stenosis in the mid V2 segment of the left vertebral artery. RECOMMENDATIONS: Unavailable       ASSESSMENT & PLAN      Ischemic stroke ([facility]  Acute left subcortical ischemic infarct with severe left MCA and left PCA stenosis along with diffuse significant intracranial atherosclerotic disease involving right MCA, right PCA Neurology on board. not a candidate for mechanical thrombectomy     Acute hypoxic respiratory failure  Patient desaturating at admission, requiring 2L NC  CXR central vascular congestion. Currently off supplemental O2. Primary hypertension  - on coreg, lisinopril at home.  S/p permissive HTN x 24 hrs. Resumed antihypertensives    CAD:   severe left MCA and left PCA stenosis along with diffuse significant intracranial atherosclerotic disease involving right MCA, right PCA  Started on aspirin, plavix and lipitor. ASCVD 38% risk CV event, on high intensity statin.        Mixed hyperlipidemia  Cholesterol 256, HDL 50, , triglycerides 305. BMI 38. Started on dual antiplatelet  Aspirin and plavix. on Lipitor 40mg  Daily.        Type 2 diabetes mellitus with hyperglycemia, with long-term current use of insulin ([facility])  Lantus 10 nightly. MDSS. glycemia 174       Acquired hypothyroidism  TSH 14. Resume synthroid 125mcg daily       GERD: resume protonix 40mg daily       CKD stage 3 due to type 2 diabetes mellitus ([facility] baseline Cr 1.1-1.3     Vishal Cheney MD  Internal Medicine Resident, PGY-1  [address], [place]  5/30/2022, 7:27 AM

## 2022-05-30 NOTE — PROGRESS NOTES
Beginning around midnight Ms Jimenez's blood pressure elevated to 210's/70-80's. At around [address] She started complaining of chest pain radiating around to her back. After multiple discussions with Dr. Lauren Mandujano she had an ekg which remained unchanged from previous ekgs. Troponin blood draw was unremarkable @ 16. She received 2 different doses of labetalol as well as one dose of hydralazine. And her bp finally came down to 150/109. She received 2 doses of toradol with only very temporary relief. Dr. Lauren Mandujano was reviewing her chart for possible alternate pain medications. Considerations made more difficult secondary to morphine allergy. Will continue to monitor and endorse further care to oncoming staff.

## 2022-05-30 NOTE — CARE COORDINATION
Discharge [address] Case Management Department  Written by: Ginger Best RN    Patient Name: Grace Obrien  Attending Provider: Pako Christine,*  Admit Date: 2022  3:09 PM  MRN: [id number]  Account: [de-identified]                     : 1950  Discharge Date: 22      Disposition: home with Family Assistance    Ginger Best RN

## 2022-05-30 NOTE — ED NOTES
Writer attempted to meet with patient, patient was unavailable at the time. Social work to continue to follow to complete PHQ-9 when able.       Homa Dear, BOLA  05/30/22 3671

## 2022-05-31 LAB
EKG ATRIAL RATE: 78 BPM
EKG ATRIAL RATE: 81 BPM
EKG P AXIS: 48 DEGREES
EKG P AXIS: 49 DEGREES
EKG P-R INTERVAL: 146 MS
EKG P-R INTERVAL: 150 MS
EKG Q-T INTERVAL: 380 MS
EKG Q-T INTERVAL: 390 MS
EKG QRS DURATION: 76 MS
EKG QRS DURATION: 80 MS
EKG QTC CALCULATION (BAZETT): 433 MS
EKG QTC CALCULATION (BAZETT): 453 MS
EKG R AXIS: 4 DEGREES
EKG R AXIS: 4 DEGREES
EKG T AXIS: 107 DEGREES
EKG T AXIS: 127 DEGREES
EKG VENTRICULAR RATE: 78 BPM
EKG VENTRICULAR RATE: 81 BPM

## 2022-06-01 NOTE — DISCHARGE SUMMARY
[facility]    Discharge Summary     Patient ID: Florina Norris  :  1950   MRN: [id number]     ACCOUNT:  [de-identified]   Patient's PCP: Hannah Cano MD  Admit Date: 2022   Discharge Date: 2022  Length of Stay: 2  Code Status:  Prior  Admitting Physician: Loli Rosario MD  Discharge Physician: Morro Liu MD     Active Discharge Diagnoses:       Primary Problem  Ischemic stroke [facility])      [facility] Problems    Diagnosis Date Noted    Intracranial atherosclerosis [I67.2]      Priority: Medium    ALPHONSE (acute kidney injury) ([facility] [N17.9]      Priority: Medium    Facial droop [R29.810]      Priority: Medium    Intracranial vascular stenosis [I67.9]      Priority: Medium    Primary hypertension [I10] 2022     Priority: Medium    Mixed hyperlipidemia [E78.2] 2022     Priority: Medium    Type 2 diabetes mellitus with hyperglycemia, with long-term current use of insulin ([facility] [E11.65, Z79.4] 2022     Priority: Medium    Ischemic stroke ([address] [I63.9] 2022     Priority: Medium    Other specified hypothyroidism [E03.8] 2022     Priority: Medium    Cerebrovascular accident (CVA) ([address] [I63.9] 2022     Priority: Medium    CKD stage 3 due to type 2 diabetes mellitus ([address] [E11.22, N18.30] 2022     Priority: Medium    Chronic diarrhea [K52.9] 2022     Priority: Medium    Acute cystitis [N30.00] 2022     Priority: Medium       Admission Condition:  poor     Discharged Condition: good    Hospital Stay:       Hospital Course:  Florina Norris is a 81 y.o. female who was admitted for the management of   Ischemic stroke ([address] , presented to ER with Altered Mental Status (altered mental status appox.  1415 became difficult speech and weakness with facial droop, leaning to left)    The patient mentioned above with past medical history of left-sided Bell's palsy, kidney disease, hyperlipidemia, hypertension, diabetes mellitus type 2, admitted to the hospital due to dysarthria and expressive aphasia. MRI showed left temporoparietal stroke. CTA with diffuse iCAD disease. Patient was loaded with dual antiplatelets started on Crestor 40 mg. LDL was 145. Hemoglobin A1c was 11. The medicine was consulted for hypertension and diabetes mellitus management. UA was positive, started on Rocephin. TSH was high, started on replacement. Echo was ordered as outpatient. Orthostatics were positive. Internal medicine recommended to follow-up with PCP as outpatient for starting the patient on insulin. To follow-up with internal medicine and PCP in 2 weeks and with neurology clinic in 1 month. Patient is stable from neurological standpoint to be discharged.   Significant therapeutic interventions:   Aspirin  Plavix  Crestor    Significant Diagnostic Studies:   Labs / Micro:  CBC:   Lab Results   Component Value Date    WBC 10.5 05/29/2022    RBC 4.45 05/29/2022    HGB 13.5 05/29/2022    HCT 38.8 05/29/2022    MCV 87.2 05/29/2022    MCH 30.3 05/29/2022    MCHC 34.8 05/29/2022    RDW 13.2 05/29/2022     05/29/2022     BMP:    Lab Results   Component Value Date    GLUCOSE 172 05/30/2022     05/30/2022    K 4.1 05/30/2022     05/30/2022    CO2 20 05/30/2022    ANIONGAP 13 05/30/2022    BUN 18 05/30/2022    CREATININE 1.06 05/30/2022    CALCIUM 8.8 05/30/2022    LABGLOM 51 05/30/2022    GFRAA >60 05/30/2022    GFR      05/30/2022     HFP:    Lab Results   Component Value Date    PROT 6.1 05/29/2022     CMP:    Lab Results   Component Value Date    GLUCOSE 172 05/30/2022     05/30/2022    K 4.1 05/30/2022     05/30/2022    CO2 20 05/30/2022    BUN 18 05/30/2022    CREATININE 1.06 05/30/2022    ANIONGAP 13 05/30/2022    ALKPHOS 57 05/29/2022    ALT 36 05/29/2022    AST 30 05/29/2022    BILITOT 0.26 05/29/2022    LABALBU 3.4 05/29/2022    ALBUMIN 1.3 05/29/2022    LABGLOM 51 05/30/2022    GFRAA >60 05/30/2022    GFR      05/30/2022    PROT 6.1 05/29/2022    CALCIUM 8.8 05/30/2022     PT/INR:    Lab Results   Component Value Date    PROTIME 10.4 05/28/2022    INR 1.0 05/28/2022     PTT:   Lab Results   Component Value Date    APTT 22.7 05/28/2022     FLP:    Lab Results   Component Value Date    CHOL 256 05/28/2022    TRIG 305 05/28/2022    HDL 50 05/28/2022     U/A:    Lab Results   Component Value Date    COLORU Yellow 05/28/2022    TURBIDITY Clear 05/28/2022    SPECGRAV 1.049 05/28/2022    HGBUR NEGATIVE 05/28/2022    PHUR 5.5 05/28/2022    PROTEINU 1+ 05/28/2022    GLUCOSEU 1+ 05/28/2022    KETUA NEGATIVE 05/28/2022    BILIRUBINUR NEGATIVE 05/28/2022    UROBILINOGEN Normal 05/28/2022    NITRU NEGATIVE 05/28/2022    LEUKOCYTESUR SMALL 05/28/2022     TSH:    Lab Results   Component Value Date    TSH 14.46 05/28/2022         Radiology:    CT HEAD WO CONTRAST    Result Date: 5/28/2022  EXAMINATION: CT OF THE HEAD WITHOUT CONTRAST  5/28/2022 3:26 pm TECHNIQUE: CT of the head was performed without the administration of intravenous contrast. Automated exposure control, iterative reconstruction, and/or weight based adjustment of the mA/kV was utilized to reduce the radiation dose to as low as reasonably achievable. COMPARISON: None. HISTORY: ORDERING SYSTEM PROVIDED HISTORY: [facility] TECHNOLOGIST PROVIDED HISTORY: cva Decision Support Exception - unselect if not a suspected or confirmed emergency medical condition->Emergency Medical Condition (MA) Reason for Exam: AMS FINDINGS: BRAIN/VENTRICLES: There is no acute intracranial hemorrhage, mass effect or midline shift. No abnormal extra-axial fluid collection. The gray-white differentiation is maintained without evidence of an acute infarct. There is no evidence of hydrocephalus. ORBITS: The visualized portion of the orbits demonstrate no acute abnormality.  SINUSES: The visualized paranasal sinuses and mastoid air cells demonstrate no acute abnormality. SOFT TISSUES/SKULL:  No acute abnormality of the visualized skull or soft tissues. No acute intracranial abnormality. RECOMMENDATIONS: Unavailable     XR CHEST PORTABLE    Result Date: 5/30/2022  EXAMINATION: ONE XRAY VIEW OF THE CHEST 5/30/2022 6:31 am COMPARISON: 28 May 2022 HISTORY: ORDERING SYSTEM PROVIDED HISTORY: pulmonary edema TECHNOLOGIST PROVIDED HISTORY: pulmonary edema Reason for Exam: upright port FINDINGS: AP portable view of the chest time stamped at 615 hours demonstrates overlying cardiac monitoring electrodes. Heart size is stable, mildly enlarged. Central vasculature appears minimally prominent without focal consolidation, overt edema, effusion or extrapleural air. Osseous structures are stable. No change from prior study. Stable mild cardiomegaly and vasculature which is minimally prominent. XR CHEST PORTABLE    Result Date: 5/28/2022  EXAMINATION: ONE XRAY VIEW OF THE CHEST 5/28/2022 7:16 pm COMPARISON: None. HISTORY: ORDERING SYSTEM PROVIDED HISTORY: pulm congestion? TECHNOLOGIST PROVIDED HISTORY: pulm congestion? Reason for Exam: port uprt FINDINGS: The lungs are underinflated, resulting in vascular crowding and subsegmental atelectasis. Central pulmonary vascular congestion without overt pulmonary edema. No focal consolidation, pleural effusion or pneumothorax. The cardiac silhouette mediastinal contours are within normal limits. No acute bony abnormality. Central pulmonary vascular congestion without overt pulmonary edema. CTA HEAD NECK W CONTRAST    Result Date: 5/28/2022  EXAMINATION: CTA OF THE HEAD AND NECK WITH CONTRAST 5/28/2022 3:26 pm: TECHNIQUE: CTA of the head and neck was performed with the administration of intravenous contrast. Multiplanar reformatted images are provided for review. MIP images are provided for review. Stenosis of the internal carotid arteries measured using NASCET criteria.  Automated exposure control, iterative reconstruction, and/or weight based adjustment of the mA/kV was utilized to reduce the radiation dose to as low as reasonably achievable. COMPARISON: None. HISTORY: ORDERING SYSTEM PROVIDED HISTORY: [facility] TECHNOLOGIST PROVIDED HISTORY: [facility] Decision Support Exception - unselect if not a suspected or confirmed emergency medical condition->Emergency Medical Condition (MA) Reason for Exam: AMS FINDINGS: CTA NECK: AORTIC ARCH/ARCH VESSELS: No dissection or arterial injury. No significant stenosis of the brachiocephalic or subclavian arteries. CAROTID ARTERIES: No dissection, arterial injury, or hemodynamically significant stenosis by NASCET criteria. VERTEBRAL ARTERIES: There is 50% stenosis in the mid V2 segment of the left vertebral artery. No dissection, arterial injury, or significant stenosis in the remainder of the bilateral vertebral arteries. SOFT TISSUES: There is ground-glass attenuation at the lung apices, likely related to mild pulmonary vascular congestion versus pneumonitis. No cervical or superior mediastinal lymphadenopathy. The larynx and pharynx are unremarkable. No acute abnormality of the salivary and thyroid glands. BONES: No acute osseous abnormality. CTA HEAD: ANTERIOR CIRCULATION: There is 90% stenosis in the mid M1 segment of the left MCA. There is partial occlusion in the proximal M2 segment of the right MCA. No significant stenosis of the intracranial internal carotid, anterior cerebral arteries. No aneurysm. POSTERIOR CIRCULATION: There is 80% stenosis in the proximal P2 segment of the right PCA. There is 80% stenosis in the mid P2 segment of the left PCA. No significant stenosis of the vertebral, basilar arteries. No aneurysm. OTHER: No dural venous sinus thrombosis on this non-dedicated study. BRAIN: No mass effect or midline shift. No extra-axial fluid collection. The gray-white differentiation is maintained.      Partial occlusion in the proximal M2 segment of the right MCA. 90% stenosis in the mid M1 segment of the left MCA. 80% stenosis in the proximal P2 segment of the right PCA. 80% stenosis in the mid P2 segment of the left PCA. 50% stenosis in the mid V2 segment of the left vertebral artery. RECOMMENDATIONS: Unavailable     MRI BRAIN WO CONTRAST    Result Date: 5/28/2022  EXAMINATION: MRI OF THE BRAIN WITHOUT CONTRAST  5/28/2022 4:28 pm TECHNIQUE: Multiplanar multisequence MRI of the brain was performed without the administration of intravenous contrast. COMPARISON: None. HISTORY: ORDERING SYSTEM PROVIDED HISTORY: dysarthria, lean towards left side TECHNOLOGIST PROVIDED HISTORY: dysarthria, lean towards left side What is the sedation requirement?->None Decision Support Exception - unselect if not a suspected or confirmed emergency medical condition->Emergency Medical Condition (MA) Reason for Exam: dysarthria, lean towards left side Relevant Medical/Surgical History: ams FINDINGS: There are subtle punctate foci of restricted diffusion with increased signal on T2/FLAIR sequence involving the left periatrial white matter and left inferior parietal lobule subcortical white matter suggestive of acute infarction. There is no intracranial hemorrhage, or intracranial mass lesion. No mass effect, midline shift, or extra-axial collection is noted. There are mild nonspecific foci of periventricular and subcortical cerebral white matter T2/FLAIR hyperintensity, most likely representing chronic microangiopathic disease in this age group. Old lacunar infarction of right caudate head. The brain parenchyma is otherwise normal. The pituitary gland is normal in appearance. The cerebellar tonsils are in normal position. The ventricles, sulci, and cisterns are prominent suggestive of generalized volume loss. The intracranial flow voids are preserved. The globes and orbits are within normal limits.  The visualized extracranial structures including paranasal sinuses and mastoid air cells are unremarkable. IMPRESSION Subtle punctate foci of acute infarction involving the left periatrial white matter and left inferior parietal lobule subcortical white matter. . Old lacunar infarction right caudate head. There is no acute intracranial hemorrhage, or intracranial mass lesion. Mild chronic microangiopathic ischemic disease. Mild generalized volume loss. RECOMMENDATIONS: Unavailable         Consultations:    Consults:     Final Specialist Recommendations/Findings:   IP CONSULT TO STROKE TEAM  IP CONSULT TO INTERNAL MEDICINE  IP CONSULT TO DIABETES EDUCATOR  IP CONSULT TO DIABETES EDUCATOR      The patient was seen and examined on day of discharge and this discharge summary is in conjunction with any daily progress note from day of discharge. Discharge plan:       Disposition: Home    Physician Follow Up:     MD Myra Cook [phone]  [address]    In 1 week      Edgar Garcia MD  [facility]  [address]  [phone]    In 2 months      Ivett Nieves MD  [address] [facility] [address] [facility]  [phone]             Requiring Further Evaluation/Follow Up POST HOSPITALIZATION/Incidental Findings:     Diet: diabetic diet, low fat, low cholesterol diet and renal diet    Activity: As tolerated    Instructions to Patient:   1. Return to hospital if symptoms worsen  2. Cont home meds  3. F/u with PCP in 2 weeks  4. F/u with neurology in 4 weeks. Endocrinology in 1 week. 5. Start aspirin and Plavix for 3 months and then aspirin monotherapy. Start Crestor 40 mg.  6.  Lisinopril was increased to 20 mg. To continue Coreg 25 mg twice daily. To follow-up with PCP and endocrine for diabetes mellitus type 2 and hypertension management. 7.  Start Keflex twice daily for 5 days.     Restrictions: Driving No, Swimming No, Operating heavy machinery No, Compromising heights No      Discharge Medications:      Medication List START taking these medications    aspirin 81 MG chewable tablet  Take 1 tablet by mouth daily     cephALEXin 500 MG capsule  Commonly known as: KEFLEX  Take 1 capsule by mouth 2 times daily for 5 days     clopidogrel 75 MG tablet  Commonly known as: PLAVIX  Take 1 tablet by mouth daily     JOBST KNEE HIGH COMPRESSION SM Misc  1 each by Does not apply route daily as needed (prologed standing)     rosuvastatin 40 MG tablet  Commonly known as: CRESTOR  Take 1 tablet by mouth nightly        CHANGE how you take these medications    lisinopril 20 MG tablet  Commonly known as: PRINIVIL;ZESTRIL  Take 1 tablet by mouth daily  What changed:   medication strength  how much to take  how to take this  when to take this        CONTINUE taking these medications    carvedilol 25 MG tablet  Commonly known as: COREG     gabapentin 300 MG capsule  Commonly known as: NEURONTIN     glimepiride 4 MG tablet  Commonly known as: AMARYL     Januvia 50 MG tablet  Generic drug: SITagliptin     levothyroxine 125 MCG tablet  Commonly known as: SYNTHROID     Loperamide A-D 2 MG tablet  Generic drug: loperamide     pantoprazole 40 MG tablet  Commonly known as: PROTONIX     sertraline 100 MG tablet  Commonly known as: ZOLOFT        STOP taking these medications    cyclobenzaprine 10 mg tablet  Commonly known as: FLEXERIL     ibuprofen 800 MG tablet  Commonly known as: ADVIL;MOTRIN     [place] FlexTouch 100 UNIT/ML Sopn  Generic drug: Insulin Degludec           Where to Get Your Medications      These medications were sent to [facility] [id number] [address] - P [phone] - F [phone]  [address] [address]    Phone: [phone]   aspirin 81 MG chewable tablet  cephALEXin 500 MG capsule  clopidogrel 75 MG tablet  JOBST KNEE HIGH COMPRESSION SM Misc  lisinopril 20 MG tablet  rosuvastatin 40 MG tablet         Time Spent on discharge is  41 mins in patient examination, evaluation, counseling as well as

## 2022-06-03 NOTE — CARE COORDINATION
.    Stroke Follow up Phone Call    Helnilda this is Hina Gracia from [facility] stroke team calling to see how you are doing since your d/c.        Medication List      START taking these medications    aspirin 81 MG chewable tablet  Take 1 tablet by mouth daily     cephALEXin 500 MG capsule  Commonly known as: KEFLEX  Take 1 capsule by mouth 2 times daily for 5 days     clopidogrel 75 MG tablet  Commonly known as: PLAVIX  Take 1 tablet by mouth daily     JOBST KNEE HIGH COMPRESSION SM Misc  1 each by Does not apply route daily as needed (prologed standing)     rosuvastatin 40 MG tablet  Commonly known as: CRESTOR  Take 1 tablet by mouth nightly        CHANGE how you take these medications    lisinopril 20 MG tablet  Commonly known as: PRINIVIL;ZESTRIL  Take 1 tablet by mouth daily  What changed:   · medication strength  · how much to take  · how to take this  · when to take this        CONTINUE taking these medications    carvedilol 25 MG tablet  Commonly known as: COREG     gabapentin 300 MG capsule  Commonly known as: NEURONTIN     glimepiride 4 MG tablet  Commonly known as: AMARYL     Januvia 50 MG tablet  Generic drug: SITagliptin     levothyroxine 125 MCG tablet  Commonly known as: SYNTHROID     Loperamide A-D 2 MG tablet  Generic drug: loperamide     pantoprazole 40 MG tablet  Commonly known as: PROTONIX     sertraline 100 MG tablet  Commonly known as: ZOLOFT        STOP taking these medications    cyclobenzaprine 10 mg tablet  Commonly known as: FLEXERIL     ibuprofen 800 MG tablet  Commonly known as: ADVIL;MOTRIN     [place] FlexTouch 100 UNIT/ML Sopn  Generic drug: Insulin Degludec           Where to Get Your Medications      These medications were sent to [facility] [id number] Tanda Kanner - [address] [facility], [facility] [address] - P [phone] - F [phone]  [address] [place] [address]    Phone: [phone]   · aspirin 81 MG chewable tablet  · cephALEXin 500 MG capsule  · clopidogrel 75 MG tablet  · JOBST KNEE HIGH COMPRESSION SM Misc  · lisinopril 20 MG tablet  · rosuvastatin 40 MG tablet         Can you tell me what medications you are taking? [x]   Yes  []   No    Do you have any questions about your medications? []   Yes  [x]   No    All medications reviewed with patient   []  Pt provided Pharmacist contact information [phone]     Do you have a follow up apt. With the neurologist?   []   Yes  [x]   No  Will be making appointment  Provided number to [facility] [phone]    Do you now your risk factors for stroke? [x]   Yes  []   No    Can you tell me the signs and symptoms of stroke? [x]   Yes  []   No  FAST instructions provided    Do if you know what to do if you were having signs/symptoms of stroke? [x]   Yes  []   No  Instructions to call 911 provided    Our goal is to provide the very best stroke care, on a scale of 1 to 10 with 10 as the very best who would you rank the care you received? What can we do better?       Electronically signed by Francisco Javier Burgos RN on 6/3/22 at 10:07 AM EDT

## 2022-06-21 ENCOUNTER — HOSPITAL ENCOUNTER (OUTPATIENT)
Dept: PHYSICAL THERAPY | Age: 72
Setting detail: THERAPIES SERIES
Discharge: HOME | End: 2022-06-21

## 2022-06-21 NOTE — FLOWSHEET NOTE
[x] [facility]) [facility]  Therapy  [address]    [phone]  F: [phone]   [] [address]  [facility] [address]   [address]  P: [phone]  F: [phone]  [] [address] &  Therapy  [address] [facility]  P: [phone]  F: [phone] [] [address] [employer]  P: [phone]  F: [phone]  [] [address]  [facility]   [address]   [place]: [phone]  F: [phone]   [] [address]  [place]: [phone]   F: [phone]     Physical Therapy Cancel/No Show note    Date: 2022  Patient: Betzaida Cardenas  : 1950  MRN: [id number]    Cancels/No Shows to date: 1 cx / 0 ns    For today's appointment patient:    [x]  Cancelled    [] Rescheduled appointment    [] No-show     Reason given by patient:    []  Patient ill    []  Conflicting appointment    [] No transportation      [] Conflict with work    [x] No reason given    [] Weather related    [] COVID-19    [x] Other:      Comments:  PT VM that pt will call back to reschedule.       [] Next appointment was confirmed    Electronically signed by: Julio Cesar King PT

## 2022-06-29 ENCOUNTER — TELEPHONE (OUTPATIENT)
Dept: NEUROLOGY | Age: 72
End: 2022-06-29

## 2022-06-29 DIAGNOSIS — Q21.12 PFO (PATENT FORAMEN OVALE): Primary | ICD-10-CM

## 2022-06-29 NOTE — TELEPHONE ENCOUNTER
The [facility] Echo lab called and said that the CVA diagnosis for the Echo was a non-payable diagnosis. Needs changed so the patient can have the test performed. Needs faxed to [phone] when changed and completed. Test was ordered on 5/30/2022.

## 2022-07-18 ENCOUNTER — HOSPITAL ENCOUNTER (EMERGENCY)
Age: 72
Discharge: HOME | End: 2022-07-18
Attending: EMERGENCY MEDICINE
Payer: MEDICARE

## 2022-07-18 ENCOUNTER — APPOINTMENT (OUTPATIENT)
Dept: CT IMAGING | Age: 72
End: 2022-07-18
Payer: MEDICARE

## 2022-07-18 ENCOUNTER — APPOINTMENT (OUTPATIENT)
Dept: GENERAL RADIOLOGY | Age: 72
End: 2022-07-18
Payer: MEDICARE

## 2022-07-18 VITALS
BODY MASS INDEX: 36.62 KG/M2 | TEMPERATURE: 99.6 F | RESPIRATION RATE: 18 BRPM | HEIGHT: 62 IN | SYSTOLIC BLOOD PRESSURE: 156 MMHG | DIASTOLIC BLOOD PRESSURE: 72 MMHG | HEART RATE: 70 BPM | WEIGHT: 199 LBS | OXYGEN SATURATION: 100 %
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
## 2022-07-18 DIAGNOSIS — S09.90XA INJURY OF HEAD, INITIAL ENCOUNTER: Primary | ICD-10-CM

## 2022-07-18 PROCEDURE — 72125 CT NECK SPINE W/O DYE: CPT

## 2022-07-18 PROCEDURE — 70450 CT HEAD/BRAIN W/O DYE: CPT

## 2022-07-18 PROCEDURE — 73502 X-RAY EXAM HIP UNI 2-3 VIEWS: CPT

## 2022-07-18 PROCEDURE — 99284 EMERGENCY DEPT VISIT MOD MDM: CPT

## 2022-07-18 RX ORDER — ACETAMINOPHEN AND CODEINE PHOSPHATE 300; 30 MG/1; MG/1
1 TABLET ORAL EVERY 6 HOURS PRN
Qty: 20 TABLET | Refills: 0 | Status: SHIPPED | OUTPATIENT
Start: 2022-07-18 | End: 2022-07-23

## 2022-07-18 ASSESSMENT — ENCOUNTER SYMPTOMS
COUGH: 0
CONSTIPATION: 0
DIARRHEA: 0
COLOR CHANGE: 0
FACIAL SWELLING: 0
VOMITING: 0
EYE DISCHARGE: 0
ABDOMINAL PAIN: 0
SHORTNESS OF BREATH: 0
EYE REDNESS: 0

## 2022-07-18 ASSESSMENT — PAIN - FUNCTIONAL ASSESSMENT: PAIN_FUNCTIONAL_ASSESSMENT: 0-10

## 2022-07-18 ASSESSMENT — PAIN SCALES - GENERAL: PAINLEVEL_OUTOF10: 7

## 2022-07-18 NOTE — ED PROVIDER NOTES
[address] [place] [address] ED  EMERGENCY DEPARTMENT ENCOUNTER      Pt Name: Tayler Barriga  MRN: [id number]  [place] 1950  Date of evaluation: 7/18/2022  Provider: Karsten Hess MD    CHIEF COMPLAINT       Chief Complaint   Patient presents with    Fall     PT to ED via Ems from home following mechanical fall (tripped on walker) how has pain to right side and top of head. No open wounds observed. PT denies LOC, is taking Plavix. HISTORY OF PRESENT ILLNESS  (Location/Symptom, Timing/Onset, Context/Setting, Quality, Duration, Modifying Factors, Severity.)   Tayler Barriga is a 81 y.o. female who presents to the emergency department for a fall. She tripped and fell and hit the vertex of her scalp causing a hematoma. Her neck hurts a little bit too. She has some mild right hip pain. This happened just before coming into the emergency department. She apparently hit her head up against a wall. No vomiting or LOC. Nursing Notes were reviewed. ALLERGIES     Morphine, Metformin, Sulfa antibiotics, and Adhesive tape    CURRENT MEDICATIONS       Previous Medications    ASPIRIN 81 MG CHEWABLE TABLET    Take 1 tablet by mouth daily    CARVEDILOL (COREG) 25 MG TABLET    2 times daily (with meals)     CLOPIDOGREL (PLAVIX) 75 MG TABLET    Take 1 tablet by mouth daily    ELASTIC BANDAGES & SUPPORTS (JOBST KNEE HIGH COMPRESSION SM) MISC    1 each by Does not apply route daily as needed (prologed standing)    GABAPENTIN (NEURONTIN) 300 MG CAPSULE    at bedtime.      GLIMEPIRIDE (AMARYL) 4 MG TABLET    in the morning and at bedtime     JANUVIA 50 MG TABLET        LEVOTHYROXINE (SYNTHROID) 125 MCG TABLET    TAKE ONE TABLET BY MOUTH DAILY    LISINOPRIL (PRINIVIL;ZESTRIL) 20 MG TABLET    Take 1 tablet by mouth daily    LOPERAMIDE (LOPERAMIDE A-D) 2 MG TABLET    Take 2 tablets by mouth 2 times daily as needed    PANTOPRAZOLE (PROTONIX) 40 MG TABLET    TAKE ONE TABLET BY MOUTH DAILY    ROSUVASTATIN (CRESTOR) 40 MG TABLET Take 1 tablet by mouth nightly    SERTRALINE (ZOLOFT) 100 MG TABLET           PAST MEDICAL HISTORY         Diagnosis Date    Depression     Diabetes mellitus ([address]     Hypertension     Tremors of nervous system 2020    being evaluated for parkinsons       SURGICAL HISTORY           Procedure Laterality Date    CHOLECYSTECTOMY      HYSTERECTOMY           FAMILY HISTORY     No family history on file. No family status information on file. SOCIAL HISTORY      reports that she has never smoked. She has never used smokeless tobacco. She reports that she does not drink alcohol and does not use drugs. REVIEW OF SYSTEMS    (2-9 systems for level 4, 10 or more for level 5)     Review of Systems   Constitutional:  Negative for chills, fatigue and fever. HENT:  Negative for congestion, ear discharge and facial swelling. Eyes:  Negative for discharge and redness. Respiratory:  Negative for cough and shortness of breath. Cardiovascular:  Negative for chest pain. Gastrointestinal:  Negative for abdominal pain, constipation, diarrhea and vomiting. Genitourinary:  Negative for dysuria and hematuria. Musculoskeletal:  Negative for arthralgias. Skin:  Negative for color change and rash. Neurological:  Negative for syncope, numbness and headaches. Hematological:  Negative for adenopathy. Psychiatric/Behavioral:  Negative for confusion. The patient is not nervous/anxious. Except as noted above the remainder of the review of systems was reviewed and negative. PHYSICAL EXAM    (up to 7 for level 4, 8 or more for level 5)     Vitals:    07/18/22 1701 07/18/22 1702 07/18/22 1713   BP:  (!) 156/72    Pulse: 70     Resp: 18     Temp: 99.6 °F (37.6 °C)     TempSrc: Oral     SpO2: 100%     Weight: 199 lb (90.3 kg)  199 lb (90.3 kg)   Height: 5' 2\" (1.575 m)  5' 2\" (1.575 m)       Physical Exam  Vitals reviewed. Constitutional:       General: She is not in acute distress.      Appearance: She is well-developed. She is not diaphoretic. HENT:      Head: Normocephalic. Comments: [place] present on the scalp just to the right of the vertex. No laceration. C-collar in place  Eyes:      General: No scleral icterus. Right eye: No discharge. Left eye: No discharge. Cardiovascular:      Rate and Rhythm: Normal rate and regular rhythm. Pulmonary:      Effort: Pulmonary effort is normal. No respiratory distress. Breath sounds: Normal breath sounds. No stridor. No wheezing or rales. Abdominal:      General: There is no distension. Palpations: Abdomen is soft. Tenderness: no abdominal tenderness   Musculoskeletal:      Cervical back: Neck supple. Comments: Mild tenderness present in the right hip area but she has active range of motion. Lymphadenopathy:      Cervical: No cervical adenopathy. Skin:     General: Skin is warm and dry. Findings: No erythema or rash. Neurological:      Mental Status: She is alert and oriented to person, place, and time. Psychiatric:         Behavior: Behavior normal.           DIAGNOSTIC RESULTS     EKG: All EKG's are interpreted by the Emergency Department Physician who either signs or Co-signs this chart in the absence of a cardiologist.    RADIOLOGY:   Non-plain film images such as CT, Ultrasound and MRI are read by the radiologist. Plain radiographic images are visualized and preliminarily interpreted by the emergency physician with the below findings:    Interpretation per the Radiologist below, if available at the time of this note:    XR HIP RIGHT (2-3 VIEWS)    Result Date: 7/18/2022  EXAMINATION: TWO XRAY VIEWS OF THE RIGHT HIP 7/18/2022 5:18 pm COMPARISON: None. HISTORY: ORDERING SYSTEM PROVIDED HISTORY: Pain TECHNOLOGIST PROVIDED HISTORY: Pain Reason for Exam: fell Additional signs and symptoms: pt fell today, pain in rt lat hip FINDINGS: Bones are diffusely demineralized.   There is mild right hip joint space narrowing and minimal degenerative spurring. No evidence of an acute fracture or dislocation. No evidence of an acute injury. If clinical symptoms persist, a follow-up study may be helpful. CT HEAD WO CONTRAST    Result Date: 7/18/2022  EXAMINATION: CT OF THE CERVICAL SPINE WITHOUT CONTRAST; CT OF THE HEAD WITHOUT CONTRAST 7/18/2022 4:40 pm TECHNIQUE: CT of the cervical spine was performed without the administration of intravenous contrast. Multiplanar reformatted images are provided for review. Automated exposure control, iterative reconstruction, and/or weight based adjustment of the mA/kV was utilized to reduce the radiation dose to as low as reasonably achievable.; CT of the head was performed without the administration of intravenous contrast. Automated exposure control, iterative reconstruction, and/or weight based adjustment of the mA/kV was utilized to reduce the radiation dose to as low as reasonably achievable. COMPARISON: CT study from May 28, 2022 HISTORY: ORDERING SYSTEM PROVIDED HISTORY: fall, pain TECHNOLOGIST PROVIDED HISTORY: fall, pain Decision Support Exception - unselect if not a suspected or confirmed emergency medical condition->Emergency Medical Condition (MA) Reason for Exam: Fall, hit head ; ORDERING SYSTEM PROVIDED HISTORY: head injury TECHNOLOGIST PROVIDED HISTORY: head injury Decision Support Exception - unselect if not a suspected or confirmed emergency medical condition->Emergency Medical Condition (MA) Reason for Exam: Fall, hit head FINDINGS: Head: BRAIN/VENTRICLES: The gyri and sulci have a normal appearance. There is mild cortical and cerebellar atrophy. Mild diffuse hypoattenuation throughout the subcortical and periventricular deep white matter, findings compatible with chronic small vessel ischemic disease. .  The gray-white matter differentiation is otherwise preserved throughout. There is no acute intracranial hemorrhage.  No intra-axial or extra-axial mass or findings of mass effect. No shift of midline structures. No abnormal extra-axial fluid collections. No acute territorial infarct. ORBITS: The visualized portion of the orbits demonstrate no acute abnormality. SINUSES: The mastoid air cells are normally aerated. Minimal fluid opacification of the left maxillary sinus. The visualized paranasal sinuses are otherwise grossly clear. SOFT TISSUES/SKULL: No significant abnormality of the visualized skull or soft tissues. No acute fracture. No scalp hematoma. Cervical Spine: BONES/ALIGNMENT:   Bone mineralization is normal.  The vertebral bodies and posterior elements appear intact and appropriately aligned without acute fracture or subluxation. Vertebral body stature is maintained throughout, as is the normal cervical lordosis. DEGENERATIVE CHANGES: There is moderate multilevel cervical degenerative disc disease and uncovertebral joint hypertrophic change throughout. A moderate degree of bony neural foraminal narrowing is present bilaterally at C6-C7 and on the left at C4-C5. SOFT TISSUES: No paraspinal soft tissue abnormality. The visualized lung apices are grossly clear. No evidence of acute intracranial process. Moderate multilevel cervical spine degenerative changes with bony neural foraminal narrowing as above. No acute fracture or traumatic subluxation. CT CERVICAL SPINE WO CONTRAST    Result Date: 7/18/2022  EXAMINATION: CT OF THE CERVICAL SPINE WITHOUT CONTRAST; CT OF THE HEAD WITHOUT CONTRAST 7/18/2022 4:40 pm TECHNIQUE: CT of the cervical spine was performed without the administration of intravenous contrast. Multiplanar reformatted images are provided for review.  Automated exposure control, iterative reconstruction, and/or weight based adjustment of the mA/kV was utilized to reduce the radiation dose to as low as reasonably achievable.; CT of the head was performed without the administration of intravenous contrast. Automated exposure control, iterative There is moderate multilevel cervical degenerative disc disease and uncovertebral joint hypertrophic change throughout. A moderate degree of bony neural foraminal narrowing is present bilaterally at C6-C7 and on the left at C4-C5. SOFT TISSUES: No paraspinal soft tissue abnormality. The visualized lung apices are grossly clear. No evidence of acute intracranial process. Moderate multilevel cervical spine degenerative changes with bony neural foraminal narrowing as above. No acute fracture or traumatic subluxation. LABS:  Labs Reviewed - No data to display    All other labs were within normal range or not returned as of this dictation. EMERGENCY DEPARTMENT COURSE and DIFFERENTIAL DIAGNOSIS/MDM:   Vitals:    Vitals:    07/18/22 1701 07/18/22 1702 07/18/22 1713   BP:  (!) 156/72    Pulse: 70     Resp: 18     Temp: 99.6 °F (37.6 °C)     TempSrc: Oral     SpO2: 100%     Weight: 199 lb (90.3 kg)  199 lb (90.3 kg)   Height: 5' 2\" (1.575 m)  5' 2\" (1.575 m)       No orders of the defined types were placed in this encounter. Medical Decision Making: Scans and x-rays are negative. She is ambulatory and able to be discharged home. Findings were discussed with the patient and her family. CONSULTS:  None    PROCEDURES:  None    FINAL IMPRESSION      1. Injury of head, initial encounter    2. Fall, initial encounter          DISPOSITION/PLAN   DISPOSITION Decision To Discharge 07/18/2022 07:46:05 PM      PATIENT REFERRED TO:   Marek Pinedo MD  [facility] [address]  [place] [id number] [phone]      As needed    [facility] ED  [address] [facility]  [phone]    If symptoms worsen    DISCHARGE MEDICATIONS:     New Prescriptions    No medications on file       The care is provided during an unprecedented national emergency due to the novel coronavirus, COVID-19.     (Please note that portions of this note were completed with a voice recognition program.  Efforts were made to edit the dictations but occasionally words are mis-transcribed.)    Parris Ibarra MD  Attending Emergency Physician            Parris Ibarra MD  07/18/22 [id number]

## 2022-07-19 NOTE — ED NOTES
Patient ambulated in [facility] with walker and standby assistance per RN. Patient tolerated activity well. Dr Massie Holstein updated.       Tod Mayo, BERENICE  07/18/22 2007

## 2022-07-19 NOTE — ED NOTES
Patients family requesting something stronger for pain, Dr Nicole Ortega notified and patients family states that she can take the Tylenol #3 Dr Nicole Ortega is suggesting. Prescription sent to patients pharmacy.         Mau Garcia RN  07/18/22 2010

## 2022-10-07 ENCOUNTER — HOSPITAL ENCOUNTER (OUTPATIENT)
Age: 72
Setting detail: SPECIMEN
Discharge: HOME | End: 2022-10-07

## 2022-10-07 LAB
ANION GAP SERPL CALCULATED.3IONS-SCNC: 8 MMOL/L (ref 9–17)
BUN BLDV-MCNC: 21 MG/DL (ref 8–23)
BUN/CREAT BLD: 24 (ref 9–20)
CALCIUM SERPL-MCNC: 9.1 MG/DL (ref 8.6–10.4)
CHLORIDE BLD-SCNC: 99 MMOL/L (ref 98–107)
CO2: 30 MMOL/L (ref 20–31)
CREAT SERPL-MCNC: 0.89 MG/DL (ref 0.5–0.9)
ESTIMATED AVERAGE GLUCOSE: 174 MG/DL
GFR SERPL CREATININE-BSD FRML MDRD: >60 ML/MIN/1.73M2
GLUCOSE BLD-MCNC: 170 MG/DL (ref 70–99)
HBA1C MFR BLD: 7.7 % (ref 4–6)
HCT VFR BLD CALC: 29.5 % (ref 36.3–47.1)
HEMOGLOBIN: 9.2 G/DL (ref 11.9–15.1)
MCH RBC QN AUTO: 28.8 PG (ref 25.2–33.5)
MCHC RBC AUTO-ENTMCNC: 31.2 G/DL (ref 28.4–34.8)
MCV RBC AUTO: 92.5 FL (ref 82.6–102.9)
NRBC AUTOMATED: 0 PER 100 WBC
PDW BLD-RTO: 14.5 % (ref 11.8–14.4)
PLATELET # BLD: 287 K/UL (ref 138–453)
PMV BLD AUTO: 9.4 FL (ref 8.1–13.5)
POTASSIUM SERPL-SCNC: 4.6 MMOL/L (ref 3.7–5.3)
RBC # BLD: 3.19 M/UL (ref 3.95–5.11)
SODIUM BLD-SCNC: 137 MMOL/L (ref 135–144)
T4 TOTAL: 5.3 UG/DL (ref 4.5–10.9)
TSH SERPL DL<=0.05 MIU/L-ACNC: 2.7 UIU/ML (ref 0.3–5)
WBC # BLD: 11.3 K/UL (ref 3.5–11.3)

## 2022-10-07 PROCEDURE — 36415 COLL VENOUS BLD VENIPUNCTURE: CPT

## 2022-10-07 PROCEDURE — 84443 ASSAY THYROID STIM HORMONE: CPT

## 2022-10-07 PROCEDURE — 80048 BASIC METABOLIC PNL TOTAL CA: CPT

## 2022-10-07 PROCEDURE — P9603 ONE-WAY ALLOW PRORATED MILES: HCPCS

## 2022-10-07 PROCEDURE — 85027 COMPLETE CBC AUTOMATED: CPT

## 2022-10-07 PROCEDURE — 83036 HEMOGLOBIN GLYCOSYLATED A1C: CPT

## 2022-10-07 PROCEDURE — 84436 ASSAY OF TOTAL THYROXINE: CPT

## 2022-10-17 ENCOUNTER — HOSPITAL ENCOUNTER (OUTPATIENT)
Age: 72
Setting detail: SPECIMEN
Discharge: HOME | End: 2022-10-17

## 2022-10-17 LAB
ANION GAP SERPL CALCULATED.3IONS-SCNC: 9 MMOL/L (ref 9–17)
BUN BLDV-MCNC: 12 MG/DL (ref 8–23)
BUN/CREAT BLD: 15 (ref 9–20)
CALCIUM SERPL-MCNC: 8.9 MG/DL (ref 8.6–10.4)
CHLORIDE BLD-SCNC: 105 MMOL/L (ref 98–107)
CO2: 26 MMOL/L (ref 20–31)
CREAT SERPL-MCNC: 0.79 MG/DL (ref 0.5–0.9)
GFR SERPL CREATININE-BSD FRML MDRD: >60 ML/MIN/1.73M2
GLUCOSE BLD-MCNC: 105 MG/DL (ref 70–99)
HCT VFR BLD CALC: 30.4 % (ref 36.3–47.1)
HEMOGLOBIN: 9.1 G/DL (ref 11.9–15.1)
MAGNESIUM: 1.8 MG/DL (ref 1.6–2.6)
MCH RBC QN AUTO: 29.5 PG (ref 25.2–33.5)
MCHC RBC AUTO-ENTMCNC: 29.9 G/DL (ref 28.4–34.8)
MCV RBC AUTO: 98.7 FL (ref 82.6–102.9)
NRBC AUTOMATED: 0 PER 100 WBC
PDW BLD-RTO: 19.1 % (ref 11.8–14.4)
PLATELET # BLD: 213 K/UL (ref 138–453)
PMV BLD AUTO: 9.1 FL (ref 8.1–13.5)
POTASSIUM SERPL-SCNC: 4 MMOL/L (ref 3.7–5.3)
RBC # BLD: 3.08 M/UL (ref 3.95–5.11)
SODIUM BLD-SCNC: 140 MMOL/L (ref 135–144)
WBC # BLD: 7.3 K/UL (ref 3.5–11.3)

## 2022-10-17 PROCEDURE — 85027 COMPLETE CBC AUTOMATED: CPT

## 2022-10-17 PROCEDURE — P9603 ONE-WAY ALLOW PRORATED MILES: HCPCS

## 2022-10-17 PROCEDURE — 80048 BASIC METABOLIC PNL TOTAL CA: CPT

## 2022-10-17 PROCEDURE — 83735 ASSAY OF MAGNESIUM: CPT

## 2022-10-17 PROCEDURE — 36415 COLL VENOUS BLD VENIPUNCTURE: CPT

## 2022-10-18 ENCOUNTER — HOSPITAL ENCOUNTER (OUTPATIENT)
Age: 72
Setting detail: SPECIMEN
Discharge: HOME | End: 2022-10-18

## 2022-10-18 PROCEDURE — 82272 OCCULT BLD FECES 1-3 TESTS: CPT

## 2022-10-19 LAB
DATE, STOOL #1: NORMAL
HEMOCCULT SP1 STL QL: NEGATIVE
TIME, STOOL #1: 1717

## 2022-10-20 ENCOUNTER — HOSPITAL ENCOUNTER (OUTPATIENT)
Age: 72
Setting detail: SPECIMEN
Discharge: HOME | End: 2022-10-20

## 2022-10-20 LAB
ANION GAP SERPL CALCULATED.3IONS-SCNC: 9 MMOL/L (ref 9–17)
BUN BLDV-MCNC: 12 MG/DL (ref 8–23)
BUN/CREAT BLD: 15 (ref 9–20)
CALCIUM SERPL-MCNC: 8.8 MG/DL (ref 8.6–10.4)
CHLORIDE BLD-SCNC: 105 MMOL/L (ref 98–107)
CO2: 29 MMOL/L (ref 20–31)
CREAT SERPL-MCNC: 0.79 MG/DL (ref 0.5–0.9)
GFR SERPL CREATININE-BSD FRML MDRD: >60 ML/MIN/1.73M2
GLUCOSE BLD-MCNC: 74 MG/DL (ref 70–99)
HCT VFR BLD CALC: 30.8 % (ref 36.3–47.1)
HEMOGLOBIN: 9.3 G/DL (ref 11.9–15.1)
MCH RBC QN AUTO: 29.8 PG (ref 25.2–33.5)
MCHC RBC AUTO-ENTMCNC: 30.2 G/DL (ref 28.4–34.8)
MCV RBC AUTO: 98.7 FL (ref 82.6–102.9)
NRBC AUTOMATED: 0 PER 100 WBC
PDW BLD-RTO: 18.9 % (ref 11.8–14.4)
PLATELET # BLD: 163 K/UL (ref 138–453)
PMV BLD AUTO: 8.9 FL (ref 8.1–13.5)
POTASSIUM SERPL-SCNC: 3.8 MMOL/L (ref 3.7–5.3)
RBC # BLD: 3.12 M/UL (ref 3.95–5.11)
SODIUM BLD-SCNC: 143 MMOL/L (ref 135–144)
WBC # BLD: 7.4 K/UL (ref 3.5–11.3)

## 2022-10-20 PROCEDURE — P9603 ONE-WAY ALLOW PRORATED MILES: HCPCS

## 2022-10-20 PROCEDURE — 36415 COLL VENOUS BLD VENIPUNCTURE: CPT

## 2022-10-20 PROCEDURE — 85027 COMPLETE CBC AUTOMATED: CPT

## 2022-10-20 PROCEDURE — 80048 BASIC METABOLIC PNL TOTAL CA: CPT

## 2024-06-15 ENCOUNTER — APPOINTMENT (OUTPATIENT)
Dept: CT IMAGING | Age: 74
End: 2024-06-15
Payer: COMMERCIAL

## 2024-06-15 ENCOUNTER — HOSPITAL ENCOUNTER (EMERGENCY)
Age: 74
Discharge: HOME | End: 2024-06-15
Attending: EMERGENCY MEDICINE
Payer: COMMERCIAL

## 2024-06-15 VITALS
OXYGEN SATURATION: 100 % | BODY MASS INDEX: 36.62 KG/M2 | HEIGHT: 62 IN | SYSTOLIC BLOOD PRESSURE: 143 MMHG | WEIGHT: 199 LBS | DIASTOLIC BLOOD PRESSURE: 66 MMHG | HEART RATE: 82 BPM | RESPIRATION RATE: 18 BRPM

## 2024-06-15 DIAGNOSIS — R53.83 OTHER FATIGUE: Primary | ICD-10-CM

## 2024-06-15 LAB
ALBUMIN SERPL-MCNC: 3.6 G/DL (ref 3.5–5.2)
ALP SERPL-CCNC: 85 U/L (ref 35–104)
ALT SERPL-CCNC: 20 U/L (ref 5–33)
ANION GAP SERPL CALCULATED.3IONS-SCNC: 11 MMOL/L (ref 9–17)
AST SERPL-CCNC: 28 U/L
BACTERIA URNS QL MICRO: ABNORMAL
BASOPHILS # BLD: 0.07 K/UL (ref 0–0.2)
BASOPHILS NFR BLD: 1 % (ref 0–2)
BILIRUB DIRECT SERPL-MCNC: 0.2 MG/DL
BILIRUB INDIRECT SERPL-MCNC: 0.1 MG/DL (ref 0–1)
BILIRUB SERPL-MCNC: 0.3 MG/DL (ref 0.3–1.2)
BILIRUB UR QL STRIP: NEGATIVE
BUN SERPL-MCNC: 34 MG/DL (ref 8–23)
BUN/CREAT SERPL: 26 (ref 9–20)
CALCIUM SERPL-MCNC: 9.1 MG/DL (ref 8.6–10.4)
CHLORIDE SERPL-SCNC: 98 MMOL/L (ref 98–107)
CLARITY UR: ABNORMAL
CO2 SERPL-SCNC: 26 MMOL/L (ref 20–31)
COLOR UR: YELLOW
CREAT SERPL-MCNC: 1.3 MG/DL (ref 0.5–0.9)
EKG ATRIAL RATE: 83 BPM
EKG P AXIS: 43 DEGREES
EKG P-R INTERVAL: 150 MS
EKG Q-T INTERVAL: 382 MS
EKG QRS DURATION: 80 MS
EKG QTC CALCULATION (BAZETT): 448 MS
EKG R AXIS: -5 DEGREES
EKG T AXIS: 98 DEGREES
EKG VENTRICULAR RATE: 83 BPM
EOSINOPHIL # BLD: 0.25 K/UL (ref 0–0.44)
EOSINOPHILS RELATIVE PERCENT: 3 % (ref 1–4)
EPI CELLS #/AREA URNS HPF: ABNORMAL /HPF (ref 0–5)
ERYTHROCYTE [DISTWIDTH] IN BLOOD BY AUTOMATED COUNT: 13.4 % (ref 11.8–14.4)
FLUAV RNA RESP QL NAA+PROBE: NOT DETECTED
FLUBV RNA RESP QL NAA+PROBE: NOT DETECTED
GFR, ESTIMATED: 44 ML/MIN/1.73M2
GLUCOSE SERPL-MCNC: 168 MG/DL (ref 70–99)
GLUCOSE UR STRIP-MCNC: NEGATIVE MG/DL
HCT VFR BLD AUTO: 39.8 % (ref 36.3–47.1)
HGB BLD-MCNC: 13.4 G/DL (ref 11.9–15.1)
HGB UR QL STRIP.AUTO: NEGATIVE
IMM GRANULOCYTES # BLD AUTO: 0.04 K/UL (ref 0–0.3)
IMM GRANULOCYTES NFR BLD: 1 %
KETONES UR STRIP-MCNC: NEGATIVE MG/DL
LACTATE BLDV-SCNC: 1.1 MMOL/L (ref 0.5–1.9)
LEUKOCYTE ESTERASE UR QL STRIP: ABNORMAL
LYMPHOCYTES NFR BLD: 2.74 K/UL (ref 1.1–3.7)
LYMPHOCYTES RELATIVE PERCENT: 37 % (ref 24–43)
MCH RBC QN AUTO: 30.6 PG (ref 25.2–33.5)
MCHC RBC AUTO-ENTMCNC: 33.7 G/DL (ref 28.4–34.8)
MCV RBC AUTO: 90.9 FL (ref 82.6–102.9)
MONOCYTES NFR BLD: 0.63 K/UL (ref 0.1–1.2)
MONOCYTES NFR BLD: 9 % (ref 3–12)
NEUTROPHILS NFR BLD: 49 % (ref 36–65)
NEUTS SEG NFR BLD: 3.71 K/UL (ref 1.5–8.1)
NITRITE UR QL STRIP: NEGATIVE
NRBC BLD-RTO: 0 PER 100 WBC
PH UR STRIP: 5.5 [PH] (ref 5–8)
PLATELET # BLD AUTO: 182 K/UL (ref 138–453)
PMV BLD AUTO: 9.2 FL (ref 8.1–13.5)
POTASSIUM SERPL-SCNC: 4.4 MMOL/L (ref 3.7–5.3)
PROT SERPL-MCNC: 7.3 G/DL (ref 6.4–8.3)
PROT UR STRIP-MCNC: NEGATIVE MG/DL
RBC # BLD AUTO: 4.38 M/UL (ref 3.95–5.11)
RBC #/AREA URNS HPF: ABNORMAL /HPF (ref 0–2)
SARS-COV-2 RNA RESP QL NAA+PROBE: NOT DETECTED
SODIUM SERPL-SCNC: 135 MMOL/L (ref 135–144)
SOURCE: NORMAL
SP GR UR STRIP: 1.01 (ref 1–1.03)
SPECIMEN DESCRIPTION: NORMAL
UROBILINOGEN UR STRIP-ACNC: NORMAL EU/DL (ref 0–1)
WBC #/AREA URNS HPF: ABNORMAL /HPF (ref 0–5)
WBC OTHER # BLD: 7.4 K/UL (ref 3.5–11.3)

## 2024-06-15 PROCEDURE — 83735 ASSAY OF MAGNESIUM: CPT

## 2024-06-15 PROCEDURE — 80048 BASIC METABOLIC PNL TOTAL CA: CPT

## 2024-06-15 PROCEDURE — 87636 SARSCOV2 & INF A&B AMP PRB: CPT

## 2024-06-15 PROCEDURE — 99284 EMERGENCY DEPT VISIT MOD MDM: CPT

## 2024-06-15 PROCEDURE — 93005 ELECTROCARDIOGRAM TRACING: CPT | Performed by: EMERGENCY MEDICINE

## 2024-06-15 PROCEDURE — 87077 CULTURE AEROBIC IDENTIFY: CPT

## 2024-06-15 PROCEDURE — 70450 CT HEAD/BRAIN W/O DYE: CPT

## 2024-06-15 PROCEDURE — 81001 URINALYSIS AUTO W/SCOPE: CPT

## 2024-06-15 PROCEDURE — 36415 COLL VENOUS BLD VENIPUNCTURE: CPT

## 2024-06-15 PROCEDURE — 80076 HEPATIC FUNCTION PANEL: CPT

## 2024-06-15 PROCEDURE — 87186 SC STD MICRODIL/AGAR DIL: CPT

## 2024-06-15 PROCEDURE — 85025 COMPLETE CBC W/AUTO DIFF WBC: CPT

## 2024-06-15 PROCEDURE — 87086 URINE CULTURE/COLONY COUNT: CPT

## 2024-06-15 PROCEDURE — 83605 ASSAY OF LACTIC ACID: CPT

## 2024-06-15 RX ORDER — 0.9 % SODIUM CHLORIDE 0.9 %
500 INTRAVENOUS SOLUTION INTRAVENOUS ONCE
Status: DISCONTINUED | OUTPATIENT
Start: 2024-06-15 | End: 2024-06-15 | Stop reason: HOSPADM

## 2024-06-15 ASSESSMENT — ENCOUNTER SYMPTOMS
COLOR CHANGE: 0
SORE THROAT: 0
RHINORRHEA: 0
SHORTNESS OF BREATH: 0
EYE REDNESS: 0
VOMITING: 0
NAUSEA: 0
COUGH: 0
DIARRHEA: 0
EYE DISCHARGE: 0

## 2024-06-15 ASSESSMENT — PAIN - FUNCTIONAL ASSESSMENT: PAIN_FUNCTIONAL_ASSESSMENT: 0-10

## 2024-06-15 NOTE — ED NOTES
Pt to ED via EMS w c/o increasing generalized weakness for the past day. EMS states pt wanted to AMA, but they convinced her to be evaluated as pt was having a hard time standing. Pt does have hx of stroke. Pt is A&OX4, no obvious facial drooping, slurring of speech, or increased weakness in one side v the other. Pt is resting on ED stretcher connected to cardiac monitor, EKG done, IV started, call light in hand, provided w warm blankets and a pillow per request, awaiting physician assessment.

## 2024-06-15 NOTE — ED PROVIDER NOTES
EMERGENCY DEPARTMENT ENCOUNTER    Pt Name: Aracelis Jimenez  MRN: [id number]  Birthdate 1950  Date of evaluation: 6/15/24  CHIEF COMPLAINT       Chief Complaint   Patient presents with    Fatigue     HISTORY OF PRESENT ILLNESS   74-year-old female presents with complaints of headache, generalized weakness.  Patient lives at a senior living facility, she has a history of these recurrent episodes of weakness, she is scheduled to see a neurologist.  These typically occur and resolve after about 10 to 15 minutes, but she has continued to have this fatigue and complains of an associated headache.           REVIEW OF SYSTEMS     Review of Systems   Constitutional:  Positive for fatigue. Negative for chills and fever.   HENT:  Negative for rhinorrhea and sore throat.    Eyes:  Negative for discharge, redness and visual disturbance.   Respiratory:  Negative for cough and shortness of breath.    Cardiovascular:  Negative for chest pain, palpitations and leg swelling.   Gastrointestinal:  Negative for diarrhea, nausea and vomiting.   Genitourinary:  Negative for dysuria and hematuria.   Musculoskeletal:  Negative for arthralgias, myalgias and neck pain.   Skin:  Negative for color change and rash.   Neurological:  Positive for headaches. Negative for seizures and weakness.   Psychiatric/Behavioral:  Negative for hallucinations, self-injury and suicidal ideas.      PASTMEDICAL HISTORY     Past Medical History:   Diagnosis Date    Depression     Diabetes mellitus (HCC)     Hypertension     Tremors of nervous system 2020    being evaluated for parkinsons     Past Problem List  Patient Active Problem List   Diagnosis Code    Primary hypertension I10    Mixed hyperlipidemia E78.2    Type 2 diabetes mellitus with hyperglycemia, with long-term current use of insulin ([facility]) E11.65, Z79.4    Acute ischemic stroke ([facility]) I63.9    Other specified hypothyroidism E03.8    Cerebrovascular accident (CVA) ([facility]) I63.9    CKD stage 3 due to type

## 2024-06-16 LAB
MICROORGANISM SPEC CULT: ABNORMAL
SERVICE CMNT-IMP: ABNORMAL
SPECIMEN DESCRIPTION: ABNORMAL

## 2024-06-17 LAB
EKG ATRIAL RATE: 83 BPM
EKG P AXIS: 43 DEGREES
EKG P-R INTERVAL: 150 MS
EKG Q-T INTERVAL: 382 MS
EKG QRS DURATION: 80 MS
EKG QTC CALCULATION (BAZETT): 448 MS
EKG R AXIS: -5 DEGREES
EKG T AXIS: 98 DEGREES
EKG VENTRICULAR RATE: 83 BPM
MICROORGANISM SPEC CULT: ABNORMAL
SERVICE CMNT-IMP: ABNORMAL
SPECIMEN DESCRIPTION: ABNORMAL

## 2024-08-22 ENCOUNTER — APPOINTMENT (OUTPATIENT)
Dept: GENERAL RADIOLOGY | Age: 74
End: 2024-08-22
Payer: COMMERCIAL

## 2024-08-22 ENCOUNTER — APPOINTMENT (OUTPATIENT)
Dept: CT IMAGING | Age: 74
End: 2024-08-22
Payer: COMMERCIAL

## 2024-08-22 ENCOUNTER — HOSPITAL ENCOUNTER (EMERGENCY)
Age: 74
Discharge: HOME | End: 2024-08-22
Attending: EMERGENCY MEDICINE
Payer: COMMERCIAL

## 2024-08-22 VITALS
SYSTOLIC BLOOD PRESSURE: 141 MMHG | OXYGEN SATURATION: 96 % | RESPIRATION RATE: 18 BRPM | HEART RATE: 69 BPM | BODY MASS INDEX: 36.8 KG/M2 | WEIGHT: 200 LBS | DIASTOLIC BLOOD PRESSURE: 59 MMHG | HEIGHT: 62 IN

## 2024-08-22 DIAGNOSIS — R55 NEAR SYNCOPE: Primary | ICD-10-CM

## 2024-08-22 LAB
ANION GAP SERPL CALCULATED.3IONS-SCNC: 13 MMOL/L (ref 9–17)
BACTERIA URNS QL MICRO: ABNORMAL
BASOPHILS # BLD: 0.06 K/UL (ref 0–0.2)
BASOPHILS NFR BLD: 1 % (ref 0–2)
BILIRUB UR QL STRIP: NEGATIVE
BUN SERPL-MCNC: 26 MG/DL (ref 8–23)
BUN/CREAT SERPL: 17 (ref 9–20)
CALCIUM SERPL-MCNC: 9.3 MG/DL (ref 8.6–10.4)
CASTS #/AREA URNS LPF: ABNORMAL /LPF
CASTS #/AREA URNS LPF: ABNORMAL /LPF
CHLORIDE SERPL-SCNC: 96 MMOL/L (ref 98–107)
CLARITY UR: ABNORMAL
CO2 SERPL-SCNC: 24 MMOL/L (ref 20–31)
COLOR UR: YELLOW
CREAT SERPL-MCNC: 1.5 MG/DL (ref 0.5–0.9)
EOSINOPHIL # BLD: 0.11 K/UL (ref 0–0.44)
EOSINOPHILS RELATIVE PERCENT: 1 % (ref 1–4)
EPI CELLS #/AREA URNS HPF: ABNORMAL /HPF (ref 0–5)
ERYTHROCYTE [DISTWIDTH] IN BLOOD BY AUTOMATED COUNT: 13.4 % (ref 11.8–14.4)
GFR, ESTIMATED: 37 ML/MIN/1.73M2
GLUCOSE BLD-MCNC: 231 MG/DL (ref 65–105)
GLUCOSE SERPL-MCNC: 206 MG/DL (ref 70–99)
GLUCOSE UR STRIP-MCNC: NEGATIVE MG/DL
HCO3 VENOUS: 24.9 MMOL/L (ref 22–29)
HCT VFR BLD AUTO: 39.7 % (ref 36.3–47.1)
HGB BLD-MCNC: 13.2 G/DL (ref 11.9–15.1)
HGB UR QL STRIP.AUTO: NEGATIVE
IMM GRANULOCYTES # BLD AUTO: 0.05 K/UL (ref 0–0.3)
IMM GRANULOCYTES NFR BLD: 0 %
KETONES UR STRIP-MCNC: NEGATIVE MG/DL
LACTATE BLDV-SCNC: 2.8 MMOL/L (ref 0.5–2.2)
LEUKOCYTE ESTERASE UR QL STRIP: ABNORMAL
LYMPHOCYTES NFR BLD: 2.24 K/UL (ref 1.1–3.7)
LYMPHOCYTES RELATIVE PERCENT: 18 % (ref 24–43)
MCH RBC QN AUTO: 31.8 PG (ref 25.2–33.5)
MCHC RBC AUTO-ENTMCNC: 33.2 G/DL (ref 28.4–34.8)
MCV RBC AUTO: 95.7 FL (ref 82.6–102.9)
MONOCYTES NFR BLD: 0.97 K/UL (ref 0.1–1.2)
MONOCYTES NFR BLD: 8 % (ref 3–12)
NEGATIVE BASE EXCESS, VEN: 1 MMOL/L (ref 0–2)
NEUTROPHILS NFR BLD: 72 % (ref 36–65)
NEUTS SEG NFR BLD: 8.81 K/UL (ref 1.5–8.1)
NITRITE UR QL STRIP: NEGATIVE
NRBC BLD-RTO: 0 PER 100 WBC
O2 SAT, VEN: 92.7 % (ref 60–85)
PCO2 VENOUS: 44.5 MM HG (ref 41–51)
PH UR STRIP: 6 [PH] (ref 5–8)
PH VENOUS: 7.36 (ref 7.32–7.43)
PLATELET # BLD AUTO: 146 K/UL (ref 138–453)
PMV BLD AUTO: 11.7 FL (ref 8.1–13.5)
PO2 VENOUS: 69.3 MM HG (ref 30–50)
POTASSIUM SERPL-SCNC: 4.1 MMOL/L (ref 3.7–5.3)
PROT UR STRIP-MCNC: ABNORMAL MG/DL
RBC # BLD AUTO: 4.15 M/UL (ref 3.95–5.11)
RBC #/AREA URNS HPF: ABNORMAL /HPF (ref 0–2)
SODIUM SERPL-SCNC: 133 MMOL/L (ref 135–144)
SP GR UR STRIP: 1.02 (ref 1–1.03)
TROPONIN I SERPL HS-MCNC: 25 NG/L (ref 0–14)
UROBILINOGEN UR STRIP-ACNC: NORMAL EU/DL (ref 0–1)
WBC #/AREA URNS HPF: ABNORMAL /HPF (ref 0–5)
WBC OTHER # BLD: 12.2 K/UL (ref 3.5–11.3)

## 2024-08-22 PROCEDURE — 82803 BLOOD GASES ANY COMBINATION: CPT

## 2024-08-22 PROCEDURE — 99285 EMERGENCY DEPT VISIT HI MDM: CPT

## 2024-08-22 PROCEDURE — 87086 URINE CULTURE/COLONY COUNT: CPT

## 2024-08-22 PROCEDURE — 70450 CT HEAD/BRAIN W/O DYE: CPT

## 2024-08-22 PROCEDURE — 80048 BASIC METABOLIC PNL TOTAL CA: CPT

## 2024-08-22 PROCEDURE — 87040 BLOOD CULTURE FOR BACTERIA: CPT

## 2024-08-22 PROCEDURE — 85025 COMPLETE CBC W/AUTO DIFF WBC: CPT

## 2024-08-22 PROCEDURE — 81001 URINALYSIS AUTO W/SCOPE: CPT

## 2024-08-22 PROCEDURE — 96374 THER/PROPH/DIAG INJ IV PUSH: CPT

## 2024-08-22 PROCEDURE — 71045 X-RAY EXAM CHEST 1 VIEW: CPT

## 2024-08-22 PROCEDURE — 84484 ASSAY OF TROPONIN QUANT: CPT

## 2024-08-22 PROCEDURE — 2580000003 HC RX 258: Performed by: EMERGENCY MEDICINE

## 2024-08-22 PROCEDURE — 82947 ASSAY GLUCOSE BLOOD QUANT: CPT

## 2024-08-22 PROCEDURE — 87186 SC STD MICRODIL/AGAR DIL: CPT

## 2024-08-22 PROCEDURE — 83605 ASSAY OF LACTIC ACID: CPT

## 2024-08-22 PROCEDURE — 87088 URINE BACTERIA CULTURE: CPT

## 2024-08-22 PROCEDURE — 93005 ELECTROCARDIOGRAM TRACING: CPT | Performed by: EMERGENCY MEDICINE

## 2024-08-22 PROCEDURE — 6360000002 HC RX W HCPCS: Performed by: EMERGENCY MEDICINE

## 2024-08-22 RX ADMIN — WATER 1000 MG: 1 INJECTION INTRAMUSCULAR; INTRAVENOUS; SUBCUTANEOUS at 20:38

## 2024-08-22 ASSESSMENT — LIFESTYLE VARIABLES: HOW OFTEN DO YOU HAVE A DRINK CONTAINING ALCOHOL: PATIENT UNABLE TO ANSWER

## 2024-08-22 NOTE — ED PROVIDER NOTES
Notable for the following components:    PO2, Hebert 69.3 (*)     O2 Sat, Hebert 92.7 (*)     All other components within normal limits   CULTURE, BLOOD 1   CULTURE, BLOOD 2   CULTURE, URINE   PROLACTIN   SPECIMEN REJECTION       Vitals Reviewed:    Vitals:    08/22/24 1845 08/22/24 1930 08/22/24 1945 08/22/24 2002   BP: (!) 136/56 (!) 147/64 (!) 143/60 (!) 141/59   Pulse: 68 68 69 69   Resp: 16 18 21 18   SpO2:       Weight:       Height:         MEDICATIONS GIVEN TO PATIENT THIS ENCOUNTER:  Orders Placed This Encounter   Medications    cefTRIAXone (ROCEPHIN) 1,000 mg in sterile water 10 mL IV syringe     Order Specific Question:   Antimicrobial Indications     Answer:   Urinary Tract Infection     DISCHARGE PRESCRIPTIONS:  New Prescriptions    No medications on file     PHYSICIAN CONSULTS ORDERED THIS ENCOUNTER:  None  FINAL IMPRESSION      1. Near syncope          DISPOSITION/PLAN   DISPOSITION Decision To Discharge 08/22/2024 09:14:30 PM  Condition at Disposition: Stable      OUTPATIENT FOLLOW UP THE PATIENT:  Francisca Daniels MD  [address]  [facility] [address]  [phone]    Schedule an appointment as soon as possible for a visit   As needed      Erica B Goldberger, MD Goldberger, Erica B, MD  08/22/24 5823

## 2024-08-23 LAB
EKG ATRIAL RATE: 69 BPM
EKG P AXIS: 35 DEGREES
EKG P-R INTERVAL: 148 MS
EKG Q-T INTERVAL: 406 MS
EKG QRS DURATION: 84 MS
EKG QTC CALCULATION (BAZETT): 435 MS
EKG R AXIS: 12 DEGREES
EKG T AXIS: 71 DEGREES
EKG VENTRICULAR RATE: 69 BPM

## 2024-08-23 NOTE — DISCHARGE INSTRUCTIONS
Continue with antibiotics.  Make sure you are getting plenty of fluids.  If there are any changes she may return to emergency room at any time.

## 2024-08-25 LAB
MICROORGANISM SPEC CULT: ABNORMAL
MICROORGANISM SPEC CULT: NORMAL
MICROORGANISM SPEC CULT: NORMAL
SERVICE CMNT-IMP: ABNORMAL
SERVICE CMNT-IMP: NORMAL
SERVICE CMNT-IMP: NORMAL
SPECIMEN DESCRIPTION: ABNORMAL
SPECIMEN DESCRIPTION: NORMAL
SPECIMEN DESCRIPTION: NORMAL

## 2024-08-27 LAB
MICROORGANISM SPEC CULT: NORMAL
MICROORGANISM SPEC CULT: NORMAL
SERVICE CMNT-IMP: NORMAL
SERVICE CMNT-IMP: NORMAL
SPECIMEN DESCRIPTION: NORMAL
SPECIMEN DESCRIPTION: NORMAL

## 2024-11-03 ENCOUNTER — APPOINTMENT (OUTPATIENT)
Dept: CT IMAGING | Age: 74
DRG: 683 | End: 2024-11-03
Payer: MEDICARE

## 2024-11-03 ENCOUNTER — HOSPITAL ENCOUNTER (INPATIENT)
Age: 74
LOS: 3 days | Discharge: HOME | DRG: 683 | End: 2024-11-06
Attending: EMERGENCY MEDICINE | Admitting: INTERNAL MEDICINE
Payer: MEDICARE

## 2024-11-03 ENCOUNTER — APPOINTMENT (OUTPATIENT)
Dept: GENERAL RADIOLOGY | Age: 74
DRG: 683 | End: 2024-11-03
Payer: MEDICARE

## 2024-11-03 DIAGNOSIS — R29.6 RECURRENT FALLS: ICD-10-CM

## 2024-11-03 DIAGNOSIS — I67.2 INTRACRANIAL ATHEROSCLEROSIS: ICD-10-CM

## 2024-11-03 DIAGNOSIS — N17.9 AKI (ACUTE KIDNEY INJURY) (HCC): ICD-10-CM

## 2024-11-03 DIAGNOSIS — R41.82 ALTERED MENTAL STATUS, UNSPECIFIED ALTERED MENTAL STATUS TYPE: ICD-10-CM

## 2024-11-03 DIAGNOSIS — E03.4 HYPOTHYROIDISM DUE TO ACQUIRED ATROPHY OF THYROID: Primary | ICD-10-CM

## 2024-11-03 LAB
ALBUMIN SERPL-MCNC: 3.9 G/DL (ref 3.5–5.2)
ALP SERPL-CCNC: 94 U/L (ref 35–104)
ALT SERPL-CCNC: 40 U/L (ref 5–33)
ANION GAP SERPL CALCULATED.3IONS-SCNC: 13 MMOL/L (ref 9–17)
AST SERPL-CCNC: 43 U/L
BACTERIA URNS QL MICRO: ABNORMAL
BASOPHILS # BLD: 0.06 K/UL (ref 0–0.2)
BASOPHILS NFR BLD: 1 % (ref 0–2)
BILIRUB SERPL-MCNC: 0.8 MG/DL (ref 0.3–1.2)
BILIRUB UR QL STRIP: NEGATIVE
BUN SERPL-MCNC: 31 MG/DL (ref 8–23)
BUN/CREAT SERPL: 17 (ref 9–20)
CALCIUM SERPL-MCNC: 9.4 MG/DL (ref 8.6–10.4)
CHLORIDE SERPL-SCNC: 99 MMOL/L (ref 98–107)
CLARITY UR: ABNORMAL
CO2 SERPL-SCNC: 24 MMOL/L (ref 20–31)
COLOR UR: YELLOW
CREAT SERPL-MCNC: 1.8 MG/DL (ref 0.5–0.9)
EOSINOPHIL # BLD: 0.19 K/UL (ref 0–0.44)
EOSINOPHILS RELATIVE PERCENT: 2 % (ref 1–4)
EPI CELLS #/AREA URNS HPF: ABNORMAL /HPF (ref 0–5)
ERYTHROCYTE [DISTWIDTH] IN BLOOD BY AUTOMATED COUNT: 13.5 % (ref 11.8–14.4)
GFR, ESTIMATED: 30 ML/MIN/1.73M2
GLUCOSE BLD-MCNC: 142 MG/DL (ref 65–105)
GLUCOSE SERPL-MCNC: 141 MG/DL (ref 70–99)
GLUCOSE UR STRIP-MCNC: NEGATIVE MG/DL
HCO3 VENOUS: 23.1 MMOL/L (ref 22–29)
HCT VFR BLD AUTO: 45 % (ref 36.3–47.1)
HGB BLD-MCNC: 15.3 G/DL (ref 11.9–15.1)
HGB UR QL STRIP.AUTO: NEGATIVE
IMM GRANULOCYTES # BLD AUTO: 0.04 K/UL (ref 0–0.3)
IMM GRANULOCYTES NFR BLD: 0 %
KETONES UR STRIP-MCNC: NEGATIVE MG/DL
LACTATE BLDV-SCNC: 2.1 MMOL/L (ref 0.5–2.2)
LEUKOCYTE ESTERASE UR QL STRIP: NEGATIVE
LYMPHOCYTES NFR BLD: 3.64 K/UL (ref 1.1–3.7)
LYMPHOCYTES RELATIVE PERCENT: 37 % (ref 24–43)
MCH RBC QN AUTO: 30.9 PG (ref 25.2–33.5)
MCHC RBC AUTO-ENTMCNC: 34 G/DL (ref 28.4–34.8)
MCV RBC AUTO: 90.9 FL (ref 82.6–102.9)
MONOCYTES NFR BLD: 0.75 K/UL (ref 0.1–1.2)
MONOCYTES NFR BLD: 8 % (ref 3–12)
NEGATIVE BASE EXCESS, VEN: 0.1 MMOL/L (ref 0–2)
NEUTROPHILS NFR BLD: 52 % (ref 36–65)
NEUTS SEG NFR BLD: 5.16 K/UL (ref 1.5–8.1)
NITRITE UR QL STRIP: POSITIVE
NRBC BLD-RTO: 0 PER 100 WBC
O2 SAT, VEN: 98.1 % (ref 60–85)
PCO2 VENOUS: 33.2 MM HG (ref 41–51)
PH UR STRIP: 6 [PH] (ref 5–8)
PH VENOUS: 7.45 (ref 7.32–7.43)
PLATELET # BLD AUTO: 193 K/UL (ref 138–453)
PMV BLD AUTO: 9.5 FL (ref 8.1–13.5)
PO2 VENOUS: 99.8 MM HG (ref 30–50)
POTASSIUM SERPL-SCNC: 4.1 MMOL/L (ref 3.7–5.3)
PROT SERPL-MCNC: 7.6 G/DL (ref 6.4–8.3)
PROT UR STRIP-MCNC: NEGATIVE MG/DL
RBC # BLD AUTO: 4.95 M/UL (ref 3.95–5.11)
RBC #/AREA URNS HPF: ABNORMAL /HPF (ref 0–2)
SODIUM SERPL-SCNC: 136 MMOL/L (ref 135–144)
SP GR UR STRIP: 1.01 (ref 1–1.03)
T4 FREE SERPL-MCNC: 2.5 NG/DL (ref 0.9–1.7)
TROPONIN I SERPL HS-MCNC: 28 NG/L (ref 0–14)
TROPONIN I SERPL HS-MCNC: 34 NG/L (ref 0–14)
TSH SERPL DL<=0.05 MIU/L-ACNC: 0.18 UIU/ML (ref 0.3–5)
UROBILINOGEN UR STRIP-ACNC: NORMAL EU/DL (ref 0–1)
WBC #/AREA URNS HPF: ABNORMAL /HPF (ref 0–5)
WBC OTHER # BLD: 9.8 K/UL (ref 3.5–11.3)

## 2024-11-03 PROCEDURE — 93005 ELECTROCARDIOGRAM TRACING: CPT | Performed by: EMERGENCY MEDICINE

## 2024-11-03 PROCEDURE — 80053 COMPREHEN METABOLIC PANEL: CPT

## 2024-11-03 PROCEDURE — 84156 ASSAY OF PROTEIN URINE: CPT

## 2024-11-03 PROCEDURE — 6360000002 HC RX W HCPCS: Performed by: NURSE PRACTITIONER

## 2024-11-03 PROCEDURE — 6370000000 HC RX 637 (ALT 250 FOR IP): Performed by: EMERGENCY MEDICINE

## 2024-11-03 PROCEDURE — 87186 SC STD MICRODIL/AGAR DIL: CPT

## 2024-11-03 PROCEDURE — 82803 BLOOD GASES ANY COMBINATION: CPT

## 2024-11-03 PROCEDURE — 84439 ASSAY OF FREE THYROXINE: CPT

## 2024-11-03 PROCEDURE — 84300 ASSAY OF URINE SODIUM: CPT

## 2024-11-03 PROCEDURE — 84443 ASSAY THYROID STIM HORMONE: CPT

## 2024-11-03 PROCEDURE — 6360000004 HC RX CONTRAST MEDICATION: Performed by: EMERGENCY MEDICINE

## 2024-11-03 PROCEDURE — 1200000000 HC SEMI PRIVATE

## 2024-11-03 PROCEDURE — 87086 URINE CULTURE/COLONY COUNT: CPT

## 2024-11-03 PROCEDURE — 85025 COMPLETE CBC W/AUTO DIFF WBC: CPT

## 2024-11-03 PROCEDURE — 70491 CT SOFT TISSUE NECK W/DYE: CPT

## 2024-11-03 PROCEDURE — 87077 CULTURE AEROBIC IDENTIFY: CPT

## 2024-11-03 PROCEDURE — 84484 ASSAY OF TROPONIN QUANT: CPT

## 2024-11-03 PROCEDURE — 83605 ASSAY OF LACTIC ACID: CPT

## 2024-11-03 PROCEDURE — 6370000000 HC RX 637 (ALT 250 FOR IP): Performed by: NURSE PRACTITIONER

## 2024-11-03 PROCEDURE — 87088 URINE BACTERIA CULTURE: CPT

## 2024-11-03 PROCEDURE — 2580000003 HC RX 258: Performed by: NURSE PRACTITIONER

## 2024-11-03 PROCEDURE — 71045 X-RAY EXAM CHEST 1 VIEW: CPT

## 2024-11-03 PROCEDURE — 70450 CT HEAD/BRAIN W/O DYE: CPT

## 2024-11-03 PROCEDURE — 99222 1ST HOSP IP/OBS MODERATE 55: CPT | Performed by: NURSE PRACTITIONER

## 2024-11-03 PROCEDURE — 81001 URINALYSIS AUTO W/SCOPE: CPT

## 2024-11-03 PROCEDURE — 82947 ASSAY GLUCOSE BLOOD QUANT: CPT

## 2024-11-03 PROCEDURE — 99285 EMERGENCY DEPT VISIT HI MDM: CPT

## 2024-11-03 PROCEDURE — 2580000003 HC RX 258: Performed by: EMERGENCY MEDICINE

## 2024-11-03 RX ORDER — SODIUM CHLORIDE 0.9 % (FLUSH) 0.9 %
10 SYRINGE (ML) INJECTION PRN
Status: DISCONTINUED | OUTPATIENT
Start: 2024-11-03 | End: 2024-11-06 | Stop reason: HOSPADM

## 2024-11-03 RX ORDER — GLIPIZIDE 10 MG/1
10 TABLET ORAL
Status: DISCONTINUED | OUTPATIENT
Start: 2024-11-04 | End: 2024-11-04

## 2024-11-03 RX ORDER — ASPIRIN 81 MG/1
81 TABLET, CHEWABLE ORAL DAILY
Status: DISCONTINUED | OUTPATIENT
Start: 2024-11-04 | End: 2024-11-06 | Stop reason: HOSPADM

## 2024-11-03 RX ORDER — SODIUM CHLORIDE 0.9 % (FLUSH) 0.9 %
5-40 SYRINGE (ML) INJECTION PRN
Status: DISCONTINUED | OUTPATIENT
Start: 2024-11-03 | End: 2024-11-06 | Stop reason: HOSPADM

## 2024-11-03 RX ORDER — ROSUVASTATIN CALCIUM 40 MG/1
40 TABLET, COATED ORAL NIGHTLY
Status: DISCONTINUED | OUTPATIENT
Start: 2024-11-03 | End: 2024-11-04

## 2024-11-03 RX ORDER — IOPAMIDOL 755 MG/ML
75 INJECTION, SOLUTION INTRAVASCULAR
Status: COMPLETED | OUTPATIENT
Start: 2024-11-03 | End: 2024-11-03

## 2024-11-03 RX ORDER — CARVEDILOL 25 MG/1
25 TABLET ORAL 2 TIMES DAILY WITH MEALS
Status: DISCONTINUED | OUTPATIENT
Start: 2024-11-03 | End: 2024-11-04

## 2024-11-03 RX ORDER — ATORVASTATIN CALCIUM 40 MG/1
40 TABLET, FILM COATED ORAL DAILY
COMMUNITY

## 2024-11-03 RX ORDER — SODIUM CHLORIDE 9 MG/ML
INJECTION, SOLUTION INTRAVENOUS CONTINUOUS
Status: DISCONTINUED | OUTPATIENT
Start: 2024-11-03 | End: 2024-11-06 | Stop reason: HOSPADM

## 2024-11-03 RX ORDER — ALOGLIPTIN 12.5 MG/1
12.5 TABLET, FILM COATED ORAL DAILY
Status: DISCONTINUED | OUTPATIENT
Start: 2024-11-03 | End: 2024-11-04

## 2024-11-03 RX ORDER — ACETAMINOPHEN 650 MG/1
650 SUPPOSITORY RECTAL EVERY 6 HOURS PRN
Status: DISCONTINUED | OUTPATIENT
Start: 2024-11-03 | End: 2024-11-06 | Stop reason: HOSPADM

## 2024-11-03 RX ORDER — ONDANSETRON 2 MG/ML
4 INJECTION INTRAMUSCULAR; INTRAVENOUS EVERY 6 HOURS PRN
Status: DISCONTINUED | OUTPATIENT
Start: 2024-11-03 | End: 2024-11-06 | Stop reason: HOSPADM

## 2024-11-03 RX ORDER — PROPRANOLOL HYDROCHLORIDE 60 MG/1
60 CAPSULE, EXTENDED RELEASE ORAL DAILY
Status: ON HOLD | COMMUNITY
End: 2024-11-05 | Stop reason: HOSPADM

## 2024-11-03 RX ORDER — SODIUM CHLORIDE 9 MG/ML
INJECTION, SOLUTION INTRAVENOUS PRN
Status: DISCONTINUED | OUTPATIENT
Start: 2024-11-03 | End: 2024-11-06 | Stop reason: HOSPADM

## 2024-11-03 RX ORDER — INSULIN GLARGINE 100 [IU]/ML
15 INJECTION, SOLUTION SUBCUTANEOUS EVERY EVENING
COMMUNITY

## 2024-11-03 RX ORDER — CLOPIDOGREL BISULFATE 75 MG/1
75 TABLET ORAL DAILY
Status: DISCONTINUED | OUTPATIENT
Start: 2024-11-03 | End: 2024-11-04

## 2024-11-03 RX ORDER — ACETAMINOPHEN 325 MG/1
650 TABLET ORAL EVERY 6 HOURS PRN
Status: DISCONTINUED | OUTPATIENT
Start: 2024-11-03 | End: 2024-11-06 | Stop reason: HOSPADM

## 2024-11-03 RX ORDER — ACETAMINOPHEN 500 MG
1000 TABLET ORAL ONCE
Status: COMPLETED | OUTPATIENT
Start: 2024-11-03 | End: 2024-11-03

## 2024-11-03 RX ORDER — PANTOPRAZOLE SODIUM 40 MG/1
40 TABLET, DELAYED RELEASE ORAL
Status: DISCONTINUED | OUTPATIENT
Start: 2024-11-04 | End: 2024-11-06 | Stop reason: HOSPADM

## 2024-11-03 RX ORDER — 0.9 % SODIUM CHLORIDE 0.9 %
500 INTRAVENOUS SOLUTION INTRAVENOUS ONCE
Status: COMPLETED | OUTPATIENT
Start: 2024-11-03 | End: 2024-11-03

## 2024-11-03 RX ORDER — HEPARIN SODIUM 5000 [USP'U]/ML
5000 INJECTION, SOLUTION INTRAVENOUS; SUBCUTANEOUS EVERY 8 HOURS SCHEDULED
Status: DISCONTINUED | OUTPATIENT
Start: 2024-11-03 | End: 2024-11-06 | Stop reason: HOSPADM

## 2024-11-03 RX ORDER — SERTRALINE HYDROCHLORIDE 100 MG/1
100 TABLET, FILM COATED ORAL DAILY
Status: DISCONTINUED | OUTPATIENT
Start: 2024-11-04 | End: 2024-11-04

## 2024-11-03 RX ORDER — BUPROPION HYDROCHLORIDE 300 MG/1
300 TABLET ORAL EVERY MORNING
COMMUNITY

## 2024-11-03 RX ORDER — LEVOTHYROXINE SODIUM 125 UG/1
125 TABLET ORAL DAILY
Status: DISCONTINUED | OUTPATIENT
Start: 2024-11-04 | End: 2024-11-06 | Stop reason: HOSPADM

## 2024-11-03 RX ORDER — ONDANSETRON 4 MG/1
4 TABLET, ORALLY DISINTEGRATING ORAL EVERY 8 HOURS PRN
Status: DISCONTINUED | OUTPATIENT
Start: 2024-11-03 | End: 2024-11-06 | Stop reason: HOSPADM

## 2024-11-03 RX ORDER — GABAPENTIN 300 MG/1
300 CAPSULE ORAL NIGHTLY
Status: DISCONTINUED | OUTPATIENT
Start: 2024-11-03 | End: 2024-11-06 | Stop reason: HOSPADM

## 2024-11-03 RX ORDER — SODIUM CHLORIDE 0.9 % (FLUSH) 0.9 %
5-40 SYRINGE (ML) INJECTION EVERY 12 HOURS SCHEDULED
Status: DISCONTINUED | OUTPATIENT
Start: 2024-11-03 | End: 2024-11-06 | Stop reason: HOSPADM

## 2024-11-03 RX ORDER — 0.9 % SODIUM CHLORIDE 0.9 %
100 INTRAVENOUS SOLUTION INTRAVENOUS ONCE
Status: COMPLETED | OUTPATIENT
Start: 2024-11-03 | End: 2024-11-03

## 2024-11-03 RX ORDER — LISINOPRIL 20 MG/1
20 TABLET ORAL DAILY
Status: DISCONTINUED | OUTPATIENT
Start: 2024-11-03 | End: 2024-11-06 | Stop reason: HOSPADM

## 2024-11-03 RX ORDER — INSULIN LISPRO 100 [IU]/ML
0-4 INJECTION, SOLUTION INTRAVENOUS; SUBCUTANEOUS
Status: DISCONTINUED | OUTPATIENT
Start: 2024-11-03 | End: 2024-11-06 | Stop reason: HOSPADM

## 2024-11-03 RX ORDER — DEXTROSE MONOHYDRATE 100 MG/ML
INJECTION, SOLUTION INTRAVENOUS CONTINUOUS PRN
Status: DISCONTINUED | OUTPATIENT
Start: 2024-11-03 | End: 2024-11-06 | Stop reason: HOSPADM

## 2024-11-03 RX ORDER — LOPERAMIDE HYDROCHLORIDE 2 MG/1
4 CAPSULE ORAL 2 TIMES DAILY PRN
Status: DISCONTINUED | OUTPATIENT
Start: 2024-11-03 | End: 2024-11-06 | Stop reason: HOSPADM

## 2024-11-03 RX ADMIN — SODIUM CHLORIDE: 9 INJECTION, SOLUTION INTRAVENOUS at 20:53

## 2024-11-03 RX ADMIN — GABAPENTIN 300 MG: 300 CAPSULE ORAL at 22:09

## 2024-11-03 RX ADMIN — IOPAMIDOL 75 ML: 755 INJECTION, SOLUTION INTRAVENOUS at 17:02

## 2024-11-03 RX ADMIN — SODIUM CHLORIDE, PRESERVATIVE FREE 10 ML: 5 INJECTION INTRAVENOUS at 17:02

## 2024-11-03 RX ADMIN — ACETAMINOPHEN 1000 MG: 500 TABLET ORAL at 17:47

## 2024-11-03 RX ADMIN — SODIUM CHLORIDE 100 ML: 9 INJECTION, SOLUTION INTRAVENOUS at 17:02

## 2024-11-03 RX ADMIN — HEPARIN SODIUM 5000 UNITS: 5000 INJECTION INTRAVENOUS; SUBCUTANEOUS at 22:09

## 2024-11-03 RX ADMIN — SODIUM CHLORIDE 500 ML: 9 INJECTION, SOLUTION INTRAVENOUS at 16:32

## 2024-11-03 ASSESSMENT — PAIN SCALES - GENERAL
PAINLEVEL_OUTOF10: 10
PAINLEVEL_OUTOF10: 0

## 2024-11-03 NOTE — ED NOTES
Pt presents to ER from home by EMS due to failure to thrive and Altered mental status. EMS states pt has a hx of AMS. Pt's friend found her in the hallway of her apartment and helped assist pt to the floor. EMS denies LOC or hitting head. EMS states pt has had failure to thrive for months, EMS states decreased eating. PT is A/O with confusion,Equal chest expansion with non labored breathing, wheels locked, bed in lowest position, call light in reach. EKG done, Pt placed om cardiac monitor.

## 2024-11-04 ENCOUNTER — APPOINTMENT (OUTPATIENT)
Dept: VASCULAR LAB | Age: 74
DRG: 683 | End: 2024-11-04
Attending: INTERNAL MEDICINE
Payer: MEDICARE

## 2024-11-04 ENCOUNTER — APPOINTMENT (OUTPATIENT)
Dept: ULTRASOUND IMAGING | Age: 74
DRG: 683 | End: 2024-11-04
Payer: MEDICARE

## 2024-11-04 PROBLEM — N39.0 RECURRENT URINARY TRACT INFECTION: Status: ACTIVE | Noted: 2024-11-04

## 2024-11-04 LAB
ALBUMIN SERPL-MCNC: 3.5 G/DL (ref 3.5–5.2)
ALP SERPL-CCNC: 83 U/L (ref 35–104)
ALT SERPL-CCNC: 30 U/L (ref 5–33)
ANION GAP SERPL CALCULATED.3IONS-SCNC: 12 MMOL/L (ref 9–17)
AST SERPL-CCNC: 32 U/L
BILIRUB SERPL-MCNC: 0.6 MG/DL (ref 0.3–1.2)
BUN SERPL-MCNC: 27 MG/DL (ref 8–23)
BUN/CREAT SERPL: 18 (ref 9–20)
C3 SERPL-MCNC: 146 MG/DL (ref 90–180)
C4 SERPL-MCNC: 23 MG/DL (ref 10–40)
CALCIUM SERPL-MCNC: 8.9 MG/DL (ref 8.6–10.4)
CHLORIDE SERPL-SCNC: 102 MMOL/L (ref 98–107)
CO2 SERPL-SCNC: 23 MMOL/L (ref 20–31)
CREAT SERPL-MCNC: 1.5 MG/DL (ref 0.5–0.9)
CREAT UR-MCNC: 107 MG/DL (ref 28–217)
ECHO BSA: 1.88 M2
EKG ATRIAL RATE: 66 BPM
EKG P AXIS: 31 DEGREES
EKG P-R INTERVAL: 166 MS
EKG Q-T INTERVAL: 440 MS
EKG QRS DURATION: 88 MS
EKG QTC CALCULATION (BAZETT): 461 MS
EKG R AXIS: -14 DEGREES
EKG T AXIS: 123 DEGREES
EKG VENTRICULAR RATE: 66 BPM
FREE KAPPA/LAMBDA RATIO: 2.62 (ref 0.22–1.74)
GFR, ESTIMATED: 37 ML/MIN/1.73M2
GLUCOSE BLD-MCNC: 103 MG/DL (ref 65–105)
GLUCOSE BLD-MCNC: 151 MG/DL (ref 65–105)
GLUCOSE BLD-MCNC: 153 MG/DL (ref 65–105)
GLUCOSE BLD-MCNC: 156 MG/DL (ref 65–105)
GLUCOSE BLD-MCNC: 86 MG/DL (ref 65–105)
GLUCOSE SERPL-MCNC: 113 MG/DL (ref 70–99)
KAPPA LC FREE SER-MCNC: 102 MG/L
LAMBDA LC FREE SERPL-MCNC: 38.9 MG/L (ref 4.2–27.7)
MAGNESIUM SERPL-MCNC: 2.4 MG/DL (ref 1.6–2.6)
POTASSIUM SERPL-SCNC: 4.4 MMOL/L (ref 3.7–5.3)
PROCALCITONIN SERPL-MCNC: 0.05 NG/ML (ref 0–0.09)
PROT SERPL-MCNC: 6.6 G/DL (ref 6.4–8.3)
SODIUM SERPL-SCNC: 137 MMOL/L (ref 135–144)
SODIUM UR-SCNC: 69 MMOL/L
SODIUM UR-SCNC: 97 MMOL/L
TOTAL PROTEIN, URINE: 18 MG/DL
TOTAL PROTEIN, URINE: 21 MG/DL
VAS LEFT BULB EDV: 4.4 CM/S
VAS LEFT BULB PSV: 69.8 CM/S
VAS LEFT CCA DIST EDV: 16.1 CM/S
VAS LEFT CCA DIST PSV: 104.8 CM/S
VAS LEFT CCA PROX EDV: 16.1 CM/S
VAS LEFT CCA PROX PSV: 121.1 CM/S
VAS LEFT ECA EDV: 6.77 CM/S
VAS LEFT ECA PSV: 153.8 CM/S
VAS LEFT ICA DIST EDV: 20.8 CM/S
VAS LEFT ICA DIST PSV: 100.1 CM/S
VAS LEFT ICA PROX EDV: 20.8 CM/S
VAS LEFT ICA PROX PSV: 93.1 CM/S
VAS LEFT ICA/CCA PSV: 0.96 NO UNITS
VAS LEFT VERTEBRAL EDV: 6.35 CM/S
VAS LEFT VERTEBRAL PSV: 45.2 CM/S
VAS RIGHT BULB EDV: 16.1 CM/S
VAS RIGHT BULB PSV: 95.5 CM/S
VAS RIGHT CCA DIST EDV: 20.8 CM/S
VAS RIGHT CCA DIST PSV: 104.8 CM/S
VAS RIGHT CCA PROX EDV: 16.1 CM/S
VAS RIGHT CCA PROX PSV: 104.8 CM/S
VAS RIGHT ECA EDV: 0 CM/S
VAS RIGHT ECA PSV: 104.8 CM/S
VAS RIGHT ICA DIST EDV: 25.4 CM/S
VAS RIGHT ICA DIST PSV: 121.1 CM/S
VAS RIGHT ICA PROX EDV: 32.5 CM/S
VAS RIGHT ICA PROX PSV: 153.8 CM/S
VAS RIGHT ICA/CCA PSV: 1.5 NO UNITS
VAS RIGHT VERTEBRAL EDV: 18.45 CM/S
VAS RIGHT VERTEBRAL PSV: 86.2 CM/S

## 2024-11-04 PROCEDURE — 6370000000 HC RX 637 (ALT 250 FOR IP): Performed by: NURSE PRACTITIONER

## 2024-11-04 PROCEDURE — 97535 SELF CARE MNGMENT TRAINING: CPT

## 2024-11-04 PROCEDURE — 82570 ASSAY OF URINE CREATININE: CPT

## 2024-11-04 PROCEDURE — 76770 US EXAM ABDO BACK WALL COMP: CPT

## 2024-11-04 PROCEDURE — 86160 COMPLEMENT ANTIGEN: CPT

## 2024-11-04 PROCEDURE — 84145 PROCALCITONIN (PCT): CPT

## 2024-11-04 PROCEDURE — 86225 DNA ANTIBODY NATIVE: CPT

## 2024-11-04 PROCEDURE — 97162 PT EVAL MOD COMPLEX 30 MIN: CPT

## 2024-11-04 PROCEDURE — 6370000000 HC RX 637 (ALT 250 FOR IP): Performed by: INTERNAL MEDICINE

## 2024-11-04 PROCEDURE — 6360000002 HC RX W HCPCS: Performed by: NURSE PRACTITIONER

## 2024-11-04 PROCEDURE — 82947 ASSAY GLUCOSE BLOOD QUANT: CPT

## 2024-11-04 PROCEDURE — 93880 EXTRACRANIAL BILAT STUDY: CPT

## 2024-11-04 PROCEDURE — 83521 IG LIGHT CHAINS FREE EACH: CPT

## 2024-11-04 PROCEDURE — 84165 PROTEIN E-PHORESIS SERUM: CPT

## 2024-11-04 PROCEDURE — 99232 SBSQ HOSP IP/OBS MODERATE 35: CPT | Performed by: INTERNAL MEDICINE

## 2024-11-04 PROCEDURE — 84300 ASSAY OF URINE SODIUM: CPT

## 2024-11-04 PROCEDURE — 84156 ASSAY OF PROTEIN URINE: CPT

## 2024-11-04 PROCEDURE — 97530 THERAPEUTIC ACTIVITIES: CPT

## 2024-11-04 PROCEDURE — 83516 IMMUNOASSAY NONANTIBODY: CPT

## 2024-11-04 PROCEDURE — 36415 COLL VENOUS BLD VENIPUNCTURE: CPT

## 2024-11-04 PROCEDURE — 97166 OT EVAL MOD COMPLEX 45 MIN: CPT

## 2024-11-04 PROCEDURE — 51798 US URINE CAPACITY MEASURE: CPT

## 2024-11-04 PROCEDURE — 2580000003 HC RX 258: Performed by: NURSE PRACTITIONER

## 2024-11-04 PROCEDURE — 83735 ASSAY OF MAGNESIUM: CPT

## 2024-11-04 PROCEDURE — 80053 COMPREHEN METABOLIC PANEL: CPT

## 2024-11-04 PROCEDURE — 84155 ASSAY OF PROTEIN SERUM: CPT

## 2024-11-04 PROCEDURE — 86038 ANTINUCLEAR ANTIBODIES: CPT

## 2024-11-04 PROCEDURE — 1200000000 HC SEMI PRIVATE

## 2024-11-04 RX ORDER — BUPROPION HYDROCHLORIDE 150 MG/1
150 TABLET ORAL EVERY MORNING
Status: DISCONTINUED | OUTPATIENT
Start: 2024-11-05 | End: 2024-11-06 | Stop reason: HOSPADM

## 2024-11-04 RX ORDER — MAGNESIUM GLUCONATE 27 MG(500)
500 TABLET ORAL DAILY
Status: DISCONTINUED | OUTPATIENT
Start: 2024-11-04 | End: 2024-11-04 | Stop reason: RX

## 2024-11-04 RX ORDER — HYDRALAZINE HYDROCHLORIDE 20 MG/ML
10 INJECTION INTRAMUSCULAR; INTRAVENOUS ONCE
Status: COMPLETED | OUTPATIENT
Start: 2024-11-04 | End: 2024-11-04

## 2024-11-04 RX ORDER — MAGNESIUM GLUCONATE 27 MG(500)
500 TABLET ORAL DAILY
COMMUNITY

## 2024-11-04 RX ORDER — ATORVASTATIN CALCIUM 40 MG/1
40 TABLET, FILM COATED ORAL DAILY
Status: DISCONTINUED | OUTPATIENT
Start: 2024-11-04 | End: 2024-11-06 | Stop reason: HOSPADM

## 2024-11-04 RX ORDER — CARVEDILOL 3.12 MG/1
3.12 TABLET ORAL 2 TIMES DAILY WITH MEALS
Status: DISCONTINUED | OUTPATIENT
Start: 2024-11-04 | End: 2024-11-06

## 2024-11-04 RX ORDER — INSULIN GLARGINE 100 [IU]/ML
15 INJECTION, SOLUTION SUBCUTANEOUS EVERY EVENING
Status: DISCONTINUED | OUTPATIENT
Start: 2024-11-04 | End: 2024-11-06 | Stop reason: HOSPADM

## 2024-11-04 RX ADMIN — LOPERAMIDE HYDROCHLORIDE 4 MG: 2 CAPSULE ORAL at 23:19

## 2024-11-04 RX ADMIN — SODIUM CHLORIDE: 9 INJECTION, SOLUTION INTRAVENOUS at 22:42

## 2024-11-04 RX ADMIN — SODIUM CHLORIDE: 9 INJECTION, SOLUTION INTRAVENOUS at 09:10

## 2024-11-04 RX ADMIN — CARVEDILOL 3.12 MG: 3.12 TABLET, FILM COATED ORAL at 16:04

## 2024-11-04 RX ADMIN — HEPARIN SODIUM 5000 UNITS: 5000 INJECTION INTRAVENOUS; SUBCUTANEOUS at 22:39

## 2024-11-04 RX ADMIN — PANTOPRAZOLE SODIUM 40 MG: 40 TABLET, DELAYED RELEASE ORAL at 06:09

## 2024-11-04 RX ADMIN — GABAPENTIN 300 MG: 300 CAPSULE ORAL at 22:39

## 2024-11-04 RX ADMIN — HEPARIN SODIUM 5000 UNITS: 5000 INJECTION INTRAVENOUS; SUBCUTANEOUS at 16:04

## 2024-11-04 RX ADMIN — ASPIRIN 81 MG CHEWABLE TABLET 81 MG: 81 TABLET CHEWABLE at 09:09

## 2024-11-04 RX ADMIN — HEPARIN SODIUM 5000 UNITS: 5000 INJECTION INTRAVENOUS; SUBCUTANEOUS at 06:00

## 2024-11-04 RX ADMIN — HYDRALAZINE HYDROCHLORIDE 10 MG: 20 INJECTION INTRAMUSCULAR; INTRAVENOUS at 04:51

## 2024-11-04 RX ADMIN — ATORVASTATIN CALCIUM 40 MG: 40 TABLET, FILM COATED ORAL at 16:04

## 2024-11-04 NOTE — CARE COORDINATION
Case Management Assessment  Initial Evaluation    Date/Time of Evaluation: 11/4/2024 11:49 AM  Assessment Completed by: Joan Allen RN    If patient is discharged prior to next notation, then this note serves as note for discharge by case management.    Patient Name: Aracelis Jimenez                   YOB: 1950  Diagnosis: ALPHONSE (acute kidney injury) (HCC) [N17.9]  Recurrent falls [R29.6]  Altered mental status, unspecified altered mental status type [R41.82]                   Date / Time: 11/3/2024  3:28 PM    Patient Admission Status: Inpatient   Readmission Risk (Low < 19, Mod (19-27), High > 27): Readmission Risk Score: 14.8    Current PCP: Francisca Daniels MD  PCP verified by CM? Yes    Chart Reviewed: Yes      History Provided by:    Patient Orientation: Alert and Oriented    Patient Cognition: Alert    Hospitalization in the last 30 days (Readmission):  No    If yes, Readmission Assessment in  Navigator will be completed.    Advance Directives:      Code Status: Full Code   Patient's Primary Decision Maker is: Legal Next of Kin    Primary Decision Maker: Pati Centeno - Child - [phone]    Secondary Decision Maker: Jamar Jimenez - Child - [phone]    Discharge Planning:    Patient lives with: Alone Type of Home: Apartment  Primary Care Giver: Self  Patient Support Systems include: Children, Family Members   Current Financial resources: Medicare  Current community resources:    Current services prior to admission: None            Current DME:              Type of Home Care services:  None    ADLS  Prior functional level: Independent in ADLs/IADLs  Current functional level: Independent in ADLs/IADLs    PT AM-PAC:   /24  OT AM-PAC:   /24    Family can provide assistance at DC: Yes  Would you like Case Management to discuss the discharge plan with any other family members/significant others, and if so, who? No  Plans to Return to Present Housing: Unknown at present  Other Identified

## 2024-11-04 NOTE — ED PROVIDER NOTES
EMERGENCY DEPARTMENT ENCOUNTER    Pt Name: Aracelis Jimenez  MRN: [id number]  Birthdate 1950  Date of evaluation: 11/3/24  CHIEF COMPLAINT       Chief Complaint   Patient presents with    Faliure to thrive     Altered Mental Status     HISTORY OF PRESENT ILLNESS   74-year-old female presents emergency room with extreme fatigue episodes of lightheadedness and tremors and difficulty caring for self at home.  Patient lives at home alone.  She is here with family.  Today she had an episode where she got lightheaded and dizzy.  She started shaking.  She had to sit down and then was unable to get up.  Family noticed patient was very fatigued and somewhat altered prompting EMS to be called.  These instances seem to be happening with increased frequency.  Patient cannot stand for more than a few minutes without getting weak.  She will get these shaking episodes will seem disoriented and then will be very fatigued sometime afterwards.  Etiology of these episodes is unclear at this time.  Family reports patient is not eating or drinking much at home because she cannot get up to make herself food.             REVIEW OF SYSTEMS     Review of Systems   Constitutional:  Positive for activity change, appetite change and fatigue.   Musculoskeletal:  Positive for gait problem.   Neurological:  Positive for dizziness and light-headedness.     PASTMEDICAL HISTORY     Past Medical History:   Diagnosis Date    Depression     Diabetes mellitus (HCC)     Hypertension     Tremors of nervous system 2020    being evaluated for parkinsons     Past Problem List  Patient Active Problem List   Diagnosis Code    Primary hypertension I10    Mixed hyperlipidemia E78.2    Type 2 diabetes mellitus with hyperglycemia, with long-term current use of insulin ([facility]) E11.65, Z79.4    Acute ischemic stroke ([facility]) I63.9    Hypothyroidism E03.9    Cerebrovascular accident (CVA) ([facility])-2 years back I63.9    CKD stage 3 due to type 2 diabetes mellitus (HCC) E11.22,

## 2024-11-04 NOTE — CONSULTS
James Lara MD  Urology Consultation    Patient:  Aracelis Jimenez  MRN: [id number]  YOB: 1950    CHIEF COMPLAINT:   recurrenti UTI    HISTORY OF PRESENT ILLNESS:   The patient is a 74 y.o. female who presents with  altered mental status acute kidney   Urology consulted, the patient has had frequent urinary tract infection according to the patient's family    Patient's old records, notes and chart reviewed and summarized above.    Past Medical History:    Past Medical History:   Diagnosis Date    Depression     Diabetes mellitus (HCC)     Hypertension     Tremors of nervous system 2020    being evaluated for parkinsons       Past Surgical History:    Past Surgical History:   Procedure Laterality Date    CHOLECYSTECTOMY      HYSTERECTOMY (CERVIX STATUS UNKNOWN)       Previous  surgery: none   Medications:    Scheduled Meds:   insulin glargine  15 Units SubCUTAneous QPM    [START ON 11/5/2024] buPROPion  150 mg Oral QAM    carvedilol  3.125 mg Oral BID WC    atorvastatin  40 mg Oral Daily    aspirin  81 mg Oral Daily    gabapentin  300 mg Oral Nightly    [Held by provider] levothyroxine  125 mcg Oral Daily    [Held by provider] lisinopril  20 mg Oral Daily    pantoprazole  40 mg Oral QAM AC    sodium chloride flush  5-40 mL IntraVENous 2 times per day    heparin (porcine)  5,000 Units SubCUTAneous 3 times per day    insulin lispro  0-4 Units SubCUTAneous 4x Daily AC & HS     Continuous Infusions:   sodium chloride 75 mL/hr at 11/04/24 0910    sodium chloride      dextrose       PRN Meds:.sodium chloride flush, loperamide, sodium chloride flush, sodium chloride, ondansetron **OR** ondansetron, acetaminophen **OR** acetaminophen, glucose, dextrose bolus **OR** dextrose bolus, glucagon (rDNA), dextrose    Allergies:  Morphine, Metformin, Sulfa antibiotics, and Adhesive tape    Social History:    Social History     Socioeconomic History    Marital status: Single     Spouse name: Not on file    Number of

## 2024-11-04 NOTE — CARE COORDINATION
Social Work-Met with patient and granddaughter to discuss dc plans.Kailyn states that she has been faling at home. Discussed short term rehab. SHe is agreeable.                    Post Acute Facility/Agency List     Provided patient with the following list, the list includes the overall star ratings obtained from CMS per the Medicare Web site (www.Medicare.gov):     [] Long Term Acute Care Facilities  [] Acute Inpatient Rehabilitation Facilities  [x] Skilled Nursing Facilities  [] Hospice Facilities  [] Home Care    Provided verbal instructions on how to utilize the QR Code to obtain additional detailed star ratings from www.Medicare.gov     offered to print and provide the detailed list:    []Accepted   [x]Declined    The following printed detailed lists were provided:     Patient has been at [email or web address] in the past and would like [facility] at [place]. Sent referral. [place]

## 2024-11-04 NOTE — CONSULTS
Nephrology Consult Note    Reason for Consult: ALPHONSE  Requesting Physician: Dr. Grayson    Chief Complaint: Altered mental status, decreased oral intake, recurrent UTI    History Obtained From:  patient, family member -daughter, electronic medical record    History of Present Illness:              This is a 74 y.o. female who presented to the hospital for evaluation of altered mental status, poor oral intake over the last couple days.  Patient and daughter were able to give me good history.  They tell me that she has been a diabetic for 25+ years, she is followed by Dr. Mena and she does have significant problems with neuropathy and is on Neurontin.  She also sees ophthalmology but has never been told of any diabetic retinopathy.  Urine studies have indicated just somewhat elevated microalbumin to creatinine ratio of around 100.  She does have history of chronic hypertension and is on lisinopril as well as Coreg.  Patient has history of dyslipidemia  Patient was recently diagnosed with essential tremor.  She has history of hypothyroidism    patient has had numerous urinary tract infections.    She admits to urinary incontinence and wears a pad pretty much all the time.  She does notice that symptoms worsen when she coughs or sneezes.  She has never had any bladder suspension surgery in the past she has not seen a urologist that she can recall.    Over the last 3 months she has had at least 3 urinary tract infections each time with E. coli.  Overall her daughter tells me that she gets urinary tract infection 6 or 7 times a year if not more.  This time around she noticed foul-smelling urine and patient did have some altered mental status and poor oral intake prior to hospitalization.  She denied any flank pain or tenderness  Home meds included Coreg and lisinopril for blood pressure control.  Patient also takes Neurontin, aspirin and Crestor along with levothyroxine.    Looking at records from [facility] as well as from

## 2024-11-04 NOTE — PLAN OF CARE
Problem: Safety - Adult  Goal: Free from fall injury  11/4/2024 1137 by Rylan Banks, RN  Outcome: Progressing  Note: Patient admitted with failure to thrive, AMS. Patient alert and oriented x4, patient with hx falls. Patient remains free from falls. Patient had a vagal episode this am. Dr. Grayson notified order for compression stockings.   11/4/2024 0619 by Samir Kaufman, RN  Outcome: Progressing

## 2024-11-04 NOTE — PLAN OF CARE
Pt admitted for ALPHONSE, on 75mL NS. BP reached 182/75 on monitor, 166/74 manual. Pt was given IV hydralozine recheck showed 146/94. Will continue to monitor.   Problem: Chronic Conditions and Co-morbidities  Goal: Patient's chronic conditions and co-morbidity symptoms are monitored and maintained or improved  Outcome: Progressing  Flowsheets (Taken 11/3/2024 2023)  Care Plan - Patient's Chronic Conditions and Co-Morbidity Symptoms are Monitored and Maintained or Improved: Monitor and assess patient's chronic conditions and comorbid symptoms for stability, deterioration, or improvement     Problem: Discharge Planning  Goal: Discharge to home or other facility with appropriate resources  Outcome: Progressing  Flowsheets (Taken 11/3/2024 2023)  Discharge to home or other facility with appropriate resources:   Identify barriers to discharge with patient and caregiver   Arrange for needed discharge resources and transportation as appropriate   Identify discharge learning needs (meds, wound care, etc)   Refer to discharge planning if patient needs post-hospital services based on physician order or complex needs related to functional status, cognitive ability or social support system     Problem: Safety - Adult  Goal: Free from fall injury  Outcome: Progressing     Problem: Neurosensory - Adult  Goal: Achieves stable or improved neurological status  Outcome: Progressing  Flowsheets (Taken 11/3/2024 2023)  Achieves stable or improved neurological status:   Assess for and report changes in neurological status   Maintain blood pressure and fluid volume within ordered parameters to optimize cerebral perfusion and minimize risk of hemorrhage     Problem: Musculoskeletal - Adult  Goal: Return mobility to safest level of function  Outcome: Progressing  Flowsheets (Taken 11/3/2024 2023)  Return Mobility to Safest Level of Function:   Assess patient stability and activity tolerance for standing, transferring and ambulating with or

## 2024-11-04 NOTE — CARE COORDINATION
Social work: phone call from [facility], they are able to accept at discharge.

## 2024-11-04 NOTE — H&P
hydration, avoid nephrotoxic's  Recheck renal function in the morning and if no improvement consider nephrology consultation  Ambulatory dysfunction with multiple episodes of near syncope over the past 2 years following and acute CVA  Check cardiac echo, encourage compression stockings  PT/OT  Recurrent UTIs with mixed incontinence  Urine culture to check for ESBL  Start Rocephin if nitrite positive  Hypertension with hyperlipidemia  Continue Coreg, Crestor  Hold lisinopril  Allow for permissive hypertension up to 140 systolic due to symptomatic vertigo at lower levels.  Hypothyroidism on Synthroid with a subtherapeutic TSH  Hold Synthroid    Consultations:   IP CONSULT TO INTERNAL MEDICINE    Patient is admitted as inpatient status because of co-morbidities listed above, severity of signs and symptoms as outlined, requirement for current medical therapies and most importantly because of direct risk to patient if care not provided in a hospital setting.  Expected length of stay > 48 hours.    DARYL Becker NP  11/3/2024  8:45 PM    Copy sent to Francisca Garcia MD

## 2024-11-05 PROBLEM — E11.43 DIABETIC AUTONOMIC NEUROPATHY ASSOCIATED WITH TYPE 2 DIABETES MELLITUS (HCC): Status: ACTIVE | Noted: 2024-11-05

## 2024-11-05 LAB
ALBUMIN PERCENT: 52 % (ref 45–65)
ALBUMIN SERPL-MCNC: 3.2 G/DL (ref 3.2–5.2)
ALPHA 2 PERCENT: 17 % (ref 6–13)
ALPHA1 GLOB SERPL ELPH-MCNC: 0.3 G/DL (ref 0.1–0.4)
ALPHA1 GLOB SERPL ELPH-MCNC: 5 % (ref 3–6)
ALPHA2 GLOB SERPL ELPH-MCNC: 1 G/DL (ref 0.5–0.9)
ANION GAP SERPL CALCULATED.3IONS-SCNC: 10 MMOL/L (ref 9–17)
B-GLOBULIN SERPL ELPH-MCNC: 0.7 G/DL (ref 0.5–1.1)
B-GLOBULIN SERPL ELPH-MCNC: 11 % (ref 11–19)
BUN SERPL-MCNC: 23 MG/DL (ref 8–23)
BUN/CREAT SERPL: 16 (ref 9–20)
CALCIUM SERPL-MCNC: 8.5 MG/DL (ref 8.6–10.4)
CHLORIDE SERPL-SCNC: 106 MMOL/L (ref 98–107)
CO2 SERPL-SCNC: 22 MMOL/L (ref 20–31)
CREAT SERPL-MCNC: 1.4 MG/DL (ref 0.5–0.9)
GAMMA GLOB SERPL ELPH-MCNC: 0.9 G/DL (ref 0.5–1.5)
GAMMA GLOBULIN %: 15 % (ref 9–20)
GFR, ESTIMATED: 40 ML/MIN/1.73M2
GLUCOSE BLD-MCNC: 174 MG/DL (ref 65–105)
GLUCOSE BLD-MCNC: 189 MG/DL (ref 65–105)
GLUCOSE BLD-MCNC: 207 MG/DL (ref 65–105)
GLUCOSE BLD-MCNC: 94 MG/DL (ref 65–105)
GLUCOSE SERPL-MCNC: 104 MG/DL (ref 70–99)
PATHOLOGIST: ABNORMAL
POTASSIUM SERPL-SCNC: 3.9 MMOL/L (ref 3.7–5.3)
PROT PATTERN SERPL ELPH-IMP: ABNORMAL
PROT SERPL-MCNC: 6.1 G/DL (ref 6.6–8.7)
SODIUM SERPL-SCNC: 138 MMOL/L (ref 135–144)
TOTAL PROT. SUM,%: 100 % (ref 98–102)
TOTAL PROT. SUM: 6.1 G/DL (ref 6.3–8.2)

## 2024-11-05 PROCEDURE — 6370000000 HC RX 637 (ALT 250 FOR IP): Performed by: NURSE PRACTITIONER

## 2024-11-05 PROCEDURE — 97110 THERAPEUTIC EXERCISES: CPT

## 2024-11-05 PROCEDURE — 6370000000 HC RX 637 (ALT 250 FOR IP): Performed by: INTERNAL MEDICINE

## 2024-11-05 PROCEDURE — 36415 COLL VENOUS BLD VENIPUNCTURE: CPT

## 2024-11-05 PROCEDURE — 1200000000 HC SEMI PRIVATE

## 2024-11-05 PROCEDURE — 97535 SELF CARE MNGMENT TRAINING: CPT

## 2024-11-05 PROCEDURE — 6360000002 HC RX W HCPCS: Performed by: NURSE PRACTITIONER

## 2024-11-05 PROCEDURE — 51701 INSERT BLADDER CATHETER: CPT

## 2024-11-05 PROCEDURE — 2580000003 HC RX 258: Performed by: NURSE PRACTITIONER

## 2024-11-05 PROCEDURE — 99239 HOSP IP/OBS DSCHRG MGMT >30: CPT | Performed by: INTERNAL MEDICINE

## 2024-11-05 PROCEDURE — 6360000002 HC RX W HCPCS: Performed by: INTERNAL MEDICINE

## 2024-11-05 PROCEDURE — 80048 BASIC METABOLIC PNL TOTAL CA: CPT

## 2024-11-05 PROCEDURE — 82947 ASSAY GLUCOSE BLOOD QUANT: CPT

## 2024-11-05 PROCEDURE — 2580000003 HC RX 258: Performed by: INTERNAL MEDICINE

## 2024-11-05 PROCEDURE — 51798 US URINE CAPACITY MEASURE: CPT

## 2024-11-05 PROCEDURE — 97116 GAIT TRAINING THERAPY: CPT

## 2024-11-05 RX ORDER — AMOXICILLIN 500 MG/1
500 CAPSULE ORAL 3 TIMES DAILY
Qty: 15 CAPSULE | Refills: 0 | Status: SHIPPED | OUTPATIENT
Start: 2024-11-05 | End: 2024-11-06

## 2024-11-05 RX ORDER — CARVEDILOL 3.12 MG/1
3.12 TABLET ORAL 2 TIMES DAILY WITH MEALS
Qty: 60 TABLET | Refills: 3 | Status: SHIPPED | OUTPATIENT
Start: 2024-11-05 | End: 2024-11-06 | Stop reason: HOSPADM

## 2024-11-05 RX ORDER — MIDODRINE HYDROCHLORIDE 5 MG/1
5 TABLET ORAL
Status: DISCONTINUED | OUTPATIENT
Start: 2024-11-05 | End: 2024-11-06

## 2024-11-05 RX ORDER — MIDODRINE HYDROCHLORIDE 5 MG/1
5 TABLET ORAL
Qty: 90 TABLET | Refills: 1 | Status: SHIPPED | OUTPATIENT
Start: 2024-11-05 | End: 2024-11-06 | Stop reason: HOSPADM

## 2024-11-05 RX ADMIN — PANTOPRAZOLE SODIUM 40 MG: 40 TABLET, DELAYED RELEASE ORAL at 05:22

## 2024-11-05 RX ADMIN — BUPROPION HYDROCHLORIDE 150 MG: 150 TABLET, EXTENDED RELEASE ORAL at 09:04

## 2024-11-05 RX ADMIN — ASPIRIN 81 MG CHEWABLE TABLET 81 MG: 81 TABLET CHEWABLE at 09:03

## 2024-11-05 RX ADMIN — INSULIN LISPRO 1 UNITS: 100 INJECTION, SOLUTION INTRAVENOUS; SUBCUTANEOUS at 21:07

## 2024-11-05 RX ADMIN — HEPARIN SODIUM 5000 UNITS: 5000 INJECTION INTRAVENOUS; SUBCUTANEOUS at 16:37

## 2024-11-05 RX ADMIN — HEPARIN SODIUM 5000 UNITS: 5000 INJECTION INTRAVENOUS; SUBCUTANEOUS at 05:22

## 2024-11-05 RX ADMIN — INSULIN GLARGINE 15 UNITS: 100 INJECTION, SOLUTION SUBCUTANEOUS at 21:06

## 2024-11-05 RX ADMIN — GABAPENTIN 300 MG: 300 CAPSULE ORAL at 21:08

## 2024-11-05 RX ADMIN — HEPARIN SODIUM 5000 UNITS: 5000 INJECTION INTRAVENOUS; SUBCUTANEOUS at 21:08

## 2024-11-05 RX ADMIN — MIDODRINE HYDROCHLORIDE 5 MG: 5 TABLET ORAL at 12:03

## 2024-11-05 RX ADMIN — SODIUM CHLORIDE: 9 INJECTION, SOLUTION INTRAVENOUS at 12:04

## 2024-11-05 RX ADMIN — CARVEDILOL 3.12 MG: 3.12 TABLET, FILM COATED ORAL at 16:36

## 2024-11-05 RX ADMIN — ATORVASTATIN CALCIUM 40 MG: 40 TABLET, FILM COATED ORAL at 09:04

## 2024-11-05 RX ADMIN — CEFTRIAXONE SODIUM 1000 MG: 1 INJECTION, POWDER, FOR SOLUTION INTRAMUSCULAR; INTRAVENOUS at 09:14

## 2024-11-05 RX ADMIN — INSULIN LISPRO 1 UNITS: 100 INJECTION, SOLUTION INTRAVENOUS; SUBCUTANEOUS at 16:37

## 2024-11-05 RX ADMIN — MIDODRINE HYDROCHLORIDE 5 MG: 5 TABLET ORAL at 16:36

## 2024-11-05 NOTE — PLAN OF CARE
Problem: Metabolic/Fluid and Electrolytes - Adult  Goal: Electrolytes maintained within normal limits  11/5/2024 1222 by Rylan Banks, RN  Outcome: Progressing  Flowsheets (Taken 11/5/2024 1222)  Electrolytes maintained within normal limits:   Monitor labs and assess patient for signs and symptoms of electrolyte imbalances   Administer electrolyte replacement as ordered   Monitor response to electrolyte replacements, including repeat lab results as appropriate  Note: Patient admitted with ALPHONSE. CR 1.8 on admission now at 1.4 patient receiving IV fluids Nephro consult. Lisinopril held. Monitoring I&O's  11/5/2024 0511 by Jennifer Dubois, RN  Outcome: Progressing

## 2024-11-05 NOTE — CARE COORDINATION
Social Work- [facility] will transport tomorrow at 11 AM to [facility]. [place]

## 2024-11-05 NOTE — DISCHARGE SUMMARY
[facility]  Office: [phone]  Ulises Yoo DO, Glenn Patterson DO, Fernando Wilson DO, Reji Gagnon DO, Radha Grayson MD, Shanell Denney MD, Juanis Zaragoza MD, Carmela Jennings MD,  Kali Harp MD, Mitra Willson MD, Alex Adair MD,  Pamela Hodge DO, Ar Pizano MD, Jaylen Sepulveda MD, Raoul Yoo DO, Katina Deras MD,  Lawrence Davila DO, Zamzam Osorio MD, Belinda Whalen MD, Crystal Rai MD, Lance Spence MD,  Wilson Granger MD, Dom Rogers MD, Bhavana Wyatt MD, Kylah Richmond MD, Humphrey Ureña MD, Guille Garcia MD, Pedro Hernandez DO, Yasir Parker MD, Shirley Waterhouse, CNP,  Sridevi Muñoz, CNP, Pedro Kuhn, CNP,  Maria G Norman, AYDEE, Edelmira Oviedo, CNP, Niki Hernandez, CNP, Penelope Reyes, CNP, Shiela Oropeza, CNP, Doris Tolliver PA-C, Prachi Reddy PA-C, Екатерина Thomas, CNP, Nena Baltazar, CNP,  Iliana Rivero, CNP, Jimena Castillo, CNP,  Kirsty Blanco, CNP, Rosaura Wolff, CNP         [facility]   IN-PATIENT SERVICE   [facility]    Discharge Summary     Patient ID: Aracelis Jimenez  :  1950   MRN: [id number]     ACCOUNT:  [id number]   Patient's PCP: Francisca Daniels MD  Admit Date: 11/3/2024   Discharge Date: 2024     Length of Stay: 3  Code Status:  Full Code  Admitting Physician: Radha Grayson MD  Discharge Physician: Radha Grayson MD     Active Discharge Diagnoses:     Hospital Problem Lists:  Principal Problem:    ALPHONSE (acute kidney injury) (HCC)  Active Problems:    Primary hypertension    Mixed hyperlipidemia    Type 2 diabetes mellitus with hyperglycemia, with long-term current use of insulin (HCC)    Hypothyroidism    Cerebrovascular accident (CVA) (HCC)-2 years back    CKD stage 3 due to type 2 diabetes mellitus (HCC)    Recurrent urinary tract infection    Diabetic autonomic neuropathy associated with type 2 diabetes mellitus (HCC)  Resolved Problems:    * No resolved hospital problems. *      Admission

## 2024-11-05 NOTE — PLAN OF CARE
Patient alert and oriented. VSS. NSR with BBB on tele. SpO2 mid 90s on RA. Afebrile. No c/o pain. Pt receiving IVF. Plan for urology eval today. Accepted at [facility] at d/c. Bed alarm on, call light within reach. Will continue to monitor.       Problem: Chronic Conditions and Co-morbidities  Goal: Patient's chronic conditions and co-morbidity symptoms are monitored and maintained or improved  Outcome: Progressing     Problem: Discharge Planning  Goal: Discharge to home or other facility with appropriate resources  Outcome: Progressing     Problem: Safety - Adult  Goal: Free from fall injury  Outcome: Progressing     Problem: Neurosensory - Adult  Goal: Achieves stable or improved neurological status  Outcome: Progressing     Problem: Musculoskeletal - Adult  Goal: Return mobility to safest level of function  Outcome: Progressing     Problem: Infection - Adult  Goal: Absence of infection at discharge  Outcome: Progressing  Goal: Absence of infection during hospitalization  Outcome: Progressing     Problem: Metabolic/Fluid and Electrolytes - Adult  Goal: Electrolytes maintained within normal limits  Outcome: Progressing  Goal: Glucose maintained within prescribed range  Outcome: Progressing     Problem: Hematologic - Adult  Goal: Maintains hematologic stability  Outcome: Progressing

## 2024-11-05 NOTE — CARE COORDINATION
Social Work-Received auth for [facility]. Contacted [facility]. They would like to take patient tomorrow due to positive orthostatic pressure and starting med today. Will arrange transportation for 11 AM tomorrow. [place]

## 2024-11-05 NOTE — DISCHARGE INSTR - COC
Continuity of Care Form    Patient Name: Aracelis Jimenez   :  1950  MRN:  [id number]    Admit date:  11/3/2024  Discharge date:  2024    Code Status Order: Full Code   Advance Directives:   Advance Care Flowsheet Documentation             Admitting Physician:  Radha Grayson MD  PCP: Francisca Daniels MD    Discharging Nurse: BERENICE Hines  Discharging Hospital Unit/Room#:   Discharging Unit Phone Number: [phone]    Emergency Contact:   Extended Emergency Contact Information  Primary Emergency Contact: Pati Centeno  Home Phone: [phone]  Relation: Child   needed? No  Secondary Emergency Contact: Jamar Jimenez  Home Phone: [phone]  Relation: Child   needed? No    Past Surgical History:  Past Surgical History:   Procedure Laterality Date    CHOLECYSTECTOMY      HYSTERECTOMY (CERVIX STATUS UNKNOWN)         Immunization History:     There is no immunization history on file for this patient.    Active Problems:  Patient Active Problem List   Diagnosis Code    Primary hypertension I10    Mixed hyperlipidemia E78.2    Type 2 diabetes mellitus with hyperglycemia, with long-term current use of insulin ([facility]) E11.65, Z79.4    Acute ischemic stroke ([facility]) I63.9    Hypothyroidism E03.9    Cerebrovascular accident (CVA) ([facility])-2 years back I63.9    CKD stage 3 due to type 2 diabetes mellitus ([facility]) E11.22, N18.30    Chronic diarrhea K52.9    Acute cystitis N30.00    ALPHONSE (acute kidney injury) ([facility]) N17.9    Facial droop R29.810    Intracranial vascular stenosis I67.9    Intracranial atherosclerosis I67.2    Recurrent urinary tract infection N39.0       Isolation/Infection:   Isolation            Contact          Patient Infection Status       Infection Onset Added Last Indicated Last Indicated By Review Planned Expiration Resolved Resolved By    ESBL (Extended Spectrum Beta Lactamase) 24 Culture, Urine        Ecoli urine 2024                       Nurse

## 2024-11-06 ENCOUNTER — TELEPHONE (OUTPATIENT)
Dept: FAMILY MEDICINE CLINIC | Age: 74
End: 2024-11-06

## 2024-11-06 VITALS
BODY MASS INDEX: 34.17 KG/M2 | OXYGEN SATURATION: 99 % | WEIGHT: 185.7 LBS | SYSTOLIC BLOOD PRESSURE: 182 MMHG | TEMPERATURE: 97.9 F | HEIGHT: 62 IN | RESPIRATION RATE: 16 BRPM | DIASTOLIC BLOOD PRESSURE: 61 MMHG | HEART RATE: 80 BPM

## 2024-11-06 LAB
ANA SER QL IA: NEGATIVE
ANCA MYELOPEROXIDASE: <0.3 AU/ML (ref 0–3.5)
ANCA PROTEINASE 3: <0.7 AU/ML (ref 0–2)
ANION GAP SERPL CALCULATED.3IONS-SCNC: 10 MMOL/L (ref 9–16)
BUN SERPL-MCNC: 19 MG/DL (ref 8–23)
CALCIUM SERPL-MCNC: 8.7 MG/DL (ref 8.8–10.2)
CHLORIDE SERPL-SCNC: 110 MMOL/L (ref 98–107)
CO2 SERPL-SCNC: 20 MMOL/L (ref 20–31)
CREAT SERPL-MCNC: 1.2 MG/DL (ref 0.5–0.9)
DSDNA IGG SER QL IA: 1.9 IU/ML
GFR, ESTIMATED: 49 ML/MIN/1.73M2
GLUCOSE BLD-MCNC: 110 MG/DL (ref 65–105)
GLUCOSE BLD-MCNC: 162 MG/DL (ref 65–105)
GLUCOSE BLD-MCNC: 196 MG/DL (ref 65–105)
GLUCOSE SERPL-MCNC: 112 MG/DL (ref 82–115)
NUCLEAR IGG SER IA-RTO: 0.1 U/ML
POTASSIUM SERPL-SCNC: 4 MMOL/L (ref 3.7–5.3)
SODIUM SERPL-SCNC: 140 MMOL/L (ref 136–145)

## 2024-11-06 PROCEDURE — 6360000002 HC RX W HCPCS: Performed by: NURSE PRACTITIONER

## 2024-11-06 PROCEDURE — 97530 THERAPEUTIC ACTIVITIES: CPT

## 2024-11-06 PROCEDURE — 2580000003 HC RX 258: Performed by: NURSE PRACTITIONER

## 2024-11-06 PROCEDURE — 82947 ASSAY GLUCOSE BLOOD QUANT: CPT

## 2024-11-06 PROCEDURE — 6370000000 HC RX 637 (ALT 250 FOR IP): Performed by: NURSE PRACTITIONER

## 2024-11-06 PROCEDURE — 80048 BASIC METABOLIC PNL TOTAL CA: CPT

## 2024-11-06 PROCEDURE — 99232 SBSQ HOSP IP/OBS MODERATE 35: CPT | Performed by: INTERNAL MEDICINE

## 2024-11-06 PROCEDURE — 36415 COLL VENOUS BLD VENIPUNCTURE: CPT

## 2024-11-06 PROCEDURE — 6370000000 HC RX 637 (ALT 250 FOR IP): Performed by: INTERNAL MEDICINE

## 2024-11-06 PROCEDURE — 97535 SELF CARE MNGMENT TRAINING: CPT

## 2024-11-06 RX ORDER — MIDODRINE HYDROCHLORIDE 5 MG/1
5 TABLET ORAL 3 TIMES DAILY PRN
Status: DISCONTINUED | OUTPATIENT
Start: 2024-11-06 | End: 2024-11-06 | Stop reason: HOSPADM

## 2024-11-06 RX ORDER — MIDODRINE HYDROCHLORIDE 5 MG/1
5 TABLET ORAL 3 TIMES DAILY PRN
Qty: 90 TABLET | Refills: 3 | Status: SHIPPED | OUTPATIENT
Start: 2024-11-06

## 2024-11-06 RX ORDER — METOPROLOL TARTRATE 25 MG/1
50 TABLET, FILM COATED ORAL 2 TIMES DAILY
Qty: 60 TABLET | Refills: 1 | Status: SHIPPED | OUTPATIENT
Start: 2024-11-06

## 2024-11-06 RX ORDER — METOPROLOL TARTRATE 25 MG/1
25 TABLET, FILM COATED ORAL 2 TIMES DAILY
Status: DISCONTINUED | OUTPATIENT
Start: 2024-11-06 | End: 2024-11-06 | Stop reason: HOSPADM

## 2024-11-06 RX ORDER — AMOXICILLIN 500 MG/1
500 CAPSULE ORAL 3 TIMES DAILY
Qty: 21 CAPSULE | Refills: 0 | Status: SHIPPED | OUTPATIENT
Start: 2024-11-06 | End: 2024-11-13

## 2024-11-06 RX ORDER — AMOXICILLIN 500 MG/1
500 CAPSULE ORAL EVERY 8 HOURS SCHEDULED
Status: DISCONTINUED | OUTPATIENT
Start: 2024-11-06 | End: 2024-11-06 | Stop reason: HOSPADM

## 2024-11-06 RX ADMIN — AMOXICILLIN 500 MG: 500 CAPSULE ORAL at 09:10

## 2024-11-06 RX ADMIN — INSULIN GLARGINE 15 UNITS: 100 INJECTION, SOLUTION SUBCUTANEOUS at 17:08

## 2024-11-06 RX ADMIN — SODIUM CHLORIDE, PRESERVATIVE FREE 10 ML: 5 INJECTION INTRAVENOUS at 09:10

## 2024-11-06 RX ADMIN — ATORVASTATIN CALCIUM 40 MG: 40 TABLET, FILM COATED ORAL at 09:09

## 2024-11-06 RX ADMIN — HEPARIN SODIUM 5000 UNITS: 5000 INJECTION INTRAVENOUS; SUBCUTANEOUS at 06:15

## 2024-11-06 RX ADMIN — ASPIRIN 81 MG CHEWABLE TABLET 81 MG: 81 TABLET CHEWABLE at 09:09

## 2024-11-06 RX ADMIN — PANTOPRAZOLE SODIUM 40 MG: 40 TABLET, DELAYED RELEASE ORAL at 06:15

## 2024-11-06 RX ADMIN — BUPROPION HYDROCHLORIDE 150 MG: 150 TABLET, EXTENDED RELEASE ORAL at 09:09

## 2024-11-06 RX ADMIN — HEPARIN SODIUM 5000 UNITS: 5000 INJECTION INTRAVENOUS; SUBCUTANEOUS at 13:40

## 2024-11-06 RX ADMIN — METOPROLOL TARTRATE 25 MG: 25 TABLET, FILM COATED ORAL at 09:09

## 2024-11-06 RX ADMIN — AMOXICILLIN 500 MG: 500 CAPSULE ORAL at 13:40

## 2024-11-06 RX ADMIN — INSULIN LISPRO 1 UNITS: 100 INJECTION, SOLUTION INTRAVENOUS; SUBCUTANEOUS at 16:50

## 2024-11-06 NOTE — CARE COORDINATION
Social Work-[facility] can admit joel. [facility] will transport at 6PM,. Orders faxed. Nurse to call report to [phone]. Discussed with patient. She is agreeable with dc plans. [place]

## 2024-11-06 NOTE — TELEPHONE ENCOUNTER
----- Message from Ema MORENO sent at 11/5/2024  3:47 PM EST -----  Regarding: [facility] Appointment Request  E[facility] Appointment Request    Patient needs appointment for E[facility] Appointment Type: New Patient.    Patient Requested Dates(s): Last week in the month of November   Patient Requested Time: Anytime   Provider Name: Jennifer Bhatia    Reason for Appointment Request: New Patient - Available appointments did not meet patient need  --------------------------------------------------------------------------------------------------------------------------    Relationship to Patient: Third Party Estela - Daughter     Call Back Information: OK to leave message on voicemail  Preferred Call Back Number: [phone]      Patient needs hospital follow up

## 2024-11-06 NOTE — PLAN OF CARE
Problem: Safety - Adult  Goal: Free from fall injury  Outcome: Progressing     Problem: Musculoskeletal - Adult  Goal: Return mobility to safest level of function  Outcome: Progressing     Problem: Gastrointestinal - Adult  Goal: Minimal or absence of nausea and vomiting  Outcome: Progressing

## 2024-11-06 NOTE — CARE COORDINATION
[employer] Work-[facility] will transport to [facility] at 11 AM. Orders faxed. Nurse to call report to [phone]. HENS completed. Patient is agreeable with dc plans. IMM letter provided to patient.  Patient offered four hours to make informed decision regarding appeal process; patient agreeable to discharge.

## 2024-11-06 NOTE — PROGRESS NOTES
James Lara MD   Urology Progress Note            Subjective: Follow-up chronic retention recurrent UTI    Patient Vitals for the past 24 hrs:   BP Temp Temp src Pulse Resp SpO2   11/06/24 0509 (!) 155/63 97.5 °F (36.4 °C) Oral 73 16 95 %   11/06/24 0022 (!) 162/68 97.7 °F (36.5 °C) Oral 80 18 96 %   11/05/24 1958 (!) 162/62 97.9 °F (36.6 °C) Oral 83 16 97 %   11/05/24 1630 (!) 153/56 -- -- 82 -- --   11/05/24 1102 (!) 58/39 -- -- -- -- --       Intake/Output Summary (Last 24 hours) at 11/6/2024 0711  Last data filed at 11/6/2024 0648  Gross per 24 hour   Intake 1493.23 ml   Output 975 ml   Net 518.23 ml       Recent Labs     11/03/24  1613   WBC 9.8   HGB 15.3*   HCT 45.0   MCV 90.9        Recent Labs     11/04/24  0908 11/05/24  0518 11/06/24  0601    138 140   K 4.4 3.9 4.0    106 110*   CO2 23 22 20   BUN 27* 23 19   CREATININE 1.5* 1.4* 1.2*       Recent Labs     11/03/24  2057   COLORU Yellow   PHUR 6.0   WBCUA 2 TO 5   RBCUA 0 TO 2   BACTERIA MODERATE*   LEUKOCYTESUR NEGATIVE   UROBILINOGEN Normal   BILIRUBINUR NEGATIVE       Additional Lab/culture results:    Physical Exam: Patient in bed not in acute distress tolerating catheter well arrangements for transfer to skilled nursing facility    Interval Imaging Findings:    Impression:    Patient Active Problem List   Diagnosis    Primary hypertension    Mixed hyperlipidemia    Type 2 diabetes mellitus with hyperglycemia, with long-term current use of insulin (HCC)    Acute ischemic stroke (HCC)    Hypothyroidism    Cerebrovascular accident (CVA) (HCC)-2 years back    CKD stage 3 due to type 2 diabetes mellitus (HCC)    Chronic diarrhea    Acute cystitis    ALPHONSE (acute kidney injury) (HCC)    Facial droop    Intracranial vascular stenosis    Intracranial atherosclerosis    Recurrent urinary tract infection    Diabetic autonomic neuropathy associated with type 2 diabetes mellitus (HCC)       Plan: Discussed 
                                James Lara MD   Urology Progress Note            Subjective: Follow-up urinary retention recurrent UTI  Patient seen in conjunction with nursing staff       Patient Vitals for the past 24 hrs:   BP Temp Temp src Pulse Resp SpO2   11/06/24 0509 (!) 155/63 97.5 °F (36.4 °C) Oral 73 16 95 %   11/06/24 0022 (!) 162/68 97.7 °F (36.5 °C) Oral 80 18 96 %   11/05/24 1958 (!) 162/62 97.9 °F (36.6 °C) Oral 83 16 97 %   11/05/24 1630 (!) 153/56 -- -- 82 -- --   11/05/24 1102 (!) 58/39 -- -- -- -- --   11/05/24 0709 (!) 162/59 97.2 °F (36.2 °C) Oral 79 17 97 %       Intake/Output Summary (Last 24 hours) at 11/6/2024 0707  Last data filed at 11/6/2024 0648  Gross per 24 hour   Intake 1493.23 ml   Output 975 ml   Net 518.23 ml       Recent Labs     11/03/24  1613   WBC 9.8   HGB 15.3*   HCT 45.0   MCV 90.9        Recent Labs     11/04/24  0908 11/05/24  0518 11/06/24  0601    138 140   K 4.4 3.9 4.0    106 110*   CO2 23 22 20   BUN 27* 23 19   CREATININE 1.5* 1.4* 1.2*       Recent Labs     11/03/24  2057   COLORU Yellow   PHUR 6.0   WBCUA 2 TO 5   RBCUA 0 TO 2   BACTERIA MODERATE*   LEUKOCYTESUR NEGATIVE   UROBILINOGEN Normal   BILIRUBINUR NEGATIVE       Additional Lab/culture results:    Physical Exam: Patient alert not in acute distress at this time no complaints of pain, we discussed with nursing staff intermittent catheterization  We will avoid using alpha-blocker because of the patient's orthostatic hypotension    Interval Imaging Findings:    Impression:    Patient Active Problem List   Diagnosis    Primary hypertension    Mixed hyperlipidemia    Type 2 diabetes mellitus with hyperglycemia, with long-term current use of insulin (HCC)    Acute ischemic stroke (HCC)    Hypothyroidism    Cerebrovascular accident (CVA) (HCC)-2 years back    CKD stage 3 due to type 2 diabetes mellitus (HCC)    Chronic diarrhea    Acute cystitis    ALPHONSE (acute kidney injury) (HCC)    Facial 
      SUBJECTIVE    Patient was seen and examined.  Now patient has Lozano catheter.  Her creatinine is better and down to 1.2 mg/dL.  Her proteinuria is close to 200 mg/g of creatinine.  Serum protein electrophoresis is unremarkable but patient had a free light chain ratio of 2.2.  Patient continues to have a significant drop in blood pressure on standing.  Her supine blood pressure was 175/60 and on standing as per nurses it was down in 70s with the symptoms of lightheadedness or dizziness.  Primary service is managing her orthostatic hypotension.    OBJECTIVE      CURRENT TEMPERATURE:  Temp: 97.5 °F (36.4 °C)  MAXIMUM TEMPERATURE OVER 24HRS:  Temp (24hrs), Av.7 °F (36.5 °C), Min:97.5 °F (36.4 °C), Max:97.9 °F (36.6 °C)    CURRENT RESPIRATORY RATE:  Respirations: 17  CURRENT PULSE:  Pulse: 68  CURRENT BLOOD PRESSURE:  BP: (!) 175/67  24HR BLOOD PRESSURE RANGE:  Systolic (24hrs), Av , Min:58 , Max:175   ; Diastolic (24hrs), Av, Min:39, Max:68    24HR INTAKE/OUTPUT:    Intake/Output Summary (Last 24 hours) at 2024 1052  Last data filed at 2024 0648  Gross per 24 hour   Intake 1493.23 ml   Output 975 ml   Net 518.23 ml     WEIGHT :Patient Vitals for the past 96 hrs (Last 3 readings):   Weight   246 84.2 kg (185 lb 11.2 oz)   24 80.7 kg (178 lb)     PHYSICAL EXAM      GENERAL APPEARANCE: Awake and alert x 3   SKIN: Warm to touch  EYES: conjunctivae normal and sclera anicteric  ENT: No thrush or pharyngeal congestion  NECK: No JVD or carotid bruit.  PULMONARY: Bilateral air entry and clear  CADRDIOVASCULAR: S1 and S2 audible no S3   ABDOMEN: soft nontender, bowel sounds present, no organomegaly,  no ascites EXTREMITIES: Trace edema  CURRENT MEDICATIONS      metoprolol tartrate (LOPRESSOR) tablet 25 mg, BID  amoxicillin (AMOXIL) capsule 500 mg, 3 times per day  midodrine (PROAMATINE) tablet 5 mg, TID WC  insulin glargine (LANTUS) injection vial 15 Units, QPM  buPROPion 
      SUBJECTIVE    Patient was seen and examined.  Patient was lying flat.  Family was present at the bedside.  Patient states that she is doing fairly well.  She denies any confusion.  Patient has significant orthostatic hypotension.  Her blood pressure was down to 60/40 on standing, corresponding pulse was not documented..  Her creatinine is improving and down to 1.4 mg/dL.  In the last 24 hours she requires straight catheter once.  Urology is on board      OBJECTIVE      CURRENT TEMPERATURE:  Temp: 97.2 °F (36.2 °C)  MAXIMUM TEMPERATURE OVER 24HRS:  Temp (24hrs), Av.7 °F (36.5 °C), Min:97.2 °F (36.2 °C), Max:98.1 °F (36.7 °C)    CURRENT RESPIRATORY RATE:  Respirations: 17  CURRENT PULSE:  Pulse: 79  CURRENT BLOOD PRESSURE:  BP: (!) 58/39 (standing orthostatic)  24HR BLOOD PRESSURE RANGE:  Systolic (24hrs), Av , Min:58 , Max:183   ; Diastolic (24hrs), Av, Min:39, Max:80    24HR INTAKE/OUTPUT:    Intake/Output Summary (Last 24 hours) at 2024 1233  Last data filed at 2024 0524  Gross per 24 hour   Intake 2391.37 ml   Output 1000 ml   Net 1391.37 ml     WEIGHT :Patient Vitals for the past 96 hrs (Last 3 readings):   Weight   24 0456 84.2 kg (185 lb 11.2 oz)   24 80.7 kg (178 lb)     PHYSICAL EXAM      GENERAL APPEARANCE: Awake and alert x 3  SKIN: Warm to touch  EYES: conjunctivae normal and sclera anicteric  ENT: No thrush or pharyngeal congestion  NECK: No JVD or carotid bruit.  PULMONARY: Bilateral air entry and clear  CADRDIOVASCULAR: S1 and S2 audible no S3   ABDOMEN: soft nontender, bowel sounds present, no organomegaly,  no ascites EXTREMITIES: Trace edema  CURRENT MEDICATIONS      cefTRIAXone (ROCEPHIN) 1,000 mg in sterile water 10 mL IV syringe, Q24H  midodrine (PROAMATINE) tablet 5 mg, TID WC  insulin glargine (LANTUS) injection vial 15 Units, QPM  buPROPion (WELLBUTRIN XL) extended release tablet 150 mg, QAM  carvedilol (COREG) tablet 3.125 mg, BID WC  atorvastatin 
.  Herbal and Nutritional Product Restrictions      The following herbal, alternative, and/or nutritional/dietary supplement product(s) has been discontinued per [facility] approved policy:      Magnesium gluconate 500mg daily    Please reorder upon discharge if appropriate.    Thank you,  Jack Zavala [facility]  11/4/2024 3:39 PM   
0820 Patient attempted to use bedpan. Patient states she is not able and asked to try BSC. Patient assisted to BSC without difficulties patient denied dizziness. Patient assisted to chair with walker patient c/o dizziness. Patient looking down while walking. Patient reminded to look up. Patient assisted to chair.   0830 Patient passed out in chair bp 84/55  HR 85 patient assisted back to bed with 4 staff assist/ patient placed in trendelenburg bp retaken at 137/51 HR 67 spo2 97RA patient responding to questions etc...  [id number] Dr Grayson notified  
Occupational Therapy  Facility/Department: [facility] MED SURG  Rehabilitation Occupational Therapy Daily Treatment Note    Date: 24  Patient Name: Aracelis Jimenez       Room:   MRN: [id number]  Account: [id number]   : 1950  (74 y.o.) Gender: female      RN Rylan reports patient is medically stable for therapy treatment this date as long as not getting up OOB. Chart reviewed prior to treatment and patient is agreeable for therapy.  All lines intact and patient positioned comfortably at end of treatment.  All patient needs addressed prior to ending therapy session.      Pt currently functioning below baseline.  Recommend daily inpatient skilled therapy at time of discharge to maximize long term outcomes and prevent re-admission. Please refer to AM-PAC score for current level of function.               Past Medical History:  has a past medical history of Depression, Diabetes mellitus (HCC), Hypertension, and Tremors of nervous system.  Past Surgical History:   has a past surgical history that includes Hysterectomy and Cholecystectomy.    Restrictions  Restrictions/Precautions: General Precautions, Fall Risk, Contact Precautions  Other position/activity restrictions: Up w/ assist, Syncopal episodes w/o warning - mobilize cautiously  Required Braces or Orthoses?: No    Subjective  Subjective: Pt resting in bed upon arrival agreeable to OT. Pt just finished getting washed up with nursing.  Restrictions/Precautions: General Precautions;Fall Risk;Contact Precautions             Objective     Cognition  Overall Cognitive Status: Exceptions  Arousal/Alertness: Appears intact  Following Commands: Follows multistep commands with repitition  Attention Span: Appears intact  Memory: Appears intact  Safety Judgement: Decreased awareness of need for safety;Decreased awareness of need for assistance  Problem Solving: Decreased awareness of errors;Assistance required to correct errors made;Assistance required to 
Occupational Therapy  Facility/Department: [facility] MED SURG  Occupational Therapy Initial Assessment    Name: Aracelis Jimenez  : 1950  MRN: [id number]  Date of Service: 2024    RN reports patient is medically stable for therapy treatment this date.    Chart reviewed prior to treatment and patient is agreeable for therapy.  All lines intact and patient positioned comfortably at end of treatment.  All patient needs addressed prior to ending therapy session.      Discharge Recommendations:  Patient would benefit from continued therapy after discharge  Pt currently functioning below baseline.  Recommend daily inpatient skilled therapy at time of discharge to maximize long term outcomes and prevent re-admission. Please refer to AM-PAC score for current level of function.  OT Equipment Recommendations  Equipment Needed: Yes  Mobility Devices: ADL Assistive Devices  ADL Assistive Devices: Reacher;Emergency Alert System;Long-handled Shoe Horn;Long-handled Sponge;Sock-Aid Hard     PER HPI: Aracelis Jimenez is a 74 y.o. Non- / non  female who presents with Faliure to thrive  and Altered Mental Status   and is admitted to the hospital for the management of ALPHONSE (acute kidney injury) (HCC).     Patient suffered a stroke 2 years ago and since that time she has had multiple episodes of near syncope/syncope.  Patient has been instructed to wear COLLEEN hose compression socks for cardiogenic syncope but she has not compliant with this.  Typically patient ambulates around her home with a rolling walker however over the past several days she has been having worsening ambulatory function and today suffered a syncopal episode and was brought to the emergency department by her family.  In the ED patient is found to have reduced renal function.  She also reports that she has recurrent urinary tract infections and finished treating for UTI approximately 1 week ago that was cultured and revealed E. coli.  She has had continued 
Patient attempted to urinate. Patient only dribbled. Patient bladder scan -per order bladder scan 325ml patient straight cath -per order.  
Patient discharged via [facility] to [facility] with all her belongings in car with sons in stable condition. Report given to BERENICE Stallings at [facility].   
[facility]  Office: [phone]  Ulises Yoo DO, Glenn Patterson DO, Fernando Wilson DO, Reji Gagnon DO, Radha Grayson MD, Shanell Denney MD, Juanis Zaragoza MD, Carmela Jennings MD,  Kali Harp MD, Mitra Willson MD, Alex Adair MD,  Pamela Hodge DO, Ar Pizano MD, Jaylen Sepulveda MD, Raoul Yoo DO, Katina Deras MD,  Lawrence Davila DO, Zamzam Osorio MD, Belinda Whalen MD, Crystal Rai MD, Lance Spence MD,  Wilson Granger MD, Dom Rogers MD, Bhavana Wyatt MD, Kylah Richmond MD, Humphrey Ureña MD, Guille Garcia MD, Pedro Hernandez DO, Yasir Parker MD, Shirley Waterhouse, CNP,  Sridevi Muñoz CNP, Pedro Kuhn, EMLER,  Maria G Norman, AYDEE, Edelmira Oviedo, CNP, Niki Hernandez, CNP, Penelope Reyes, CNP, Shiela Oropeza, CNP, Doris Tolliver PAIvanC, CAROL KnoxC, Екатерина Thomas, CNP, Nena Baltazar, CNP,  Iliana Rivero, CNP, Jimena Castillo, CNP,  Kirsty Blanco, CNP, Rosaura Wolff, CNP         [facility]   IN-PATIENT SERVICE   [facility]    Progress Note    11/4/2024    12:03 PM    Name:   Aracelis Jimenez  MRN:     [id number]     Acct:      [id number]   Room:   2020/2020-01  IP Day:  1  Admit Date:  11/3/2024  3:28 PM    PCP:   Francisca Daniels MD  Code Status:  Full Code    Subjective:     C/C:   Chief Complaint   Patient presents with    Faliure to thrive     Altered Mental Status     Interval History Status: .   She was hypotensive today while walking to bathroom but improved quickly after sitting in bed  Denies dizziness at present  She uses walker at home but has been falling frequently  Current creatinine 1.8-1.5, recent baseline is 1.1-1.3 which was 0.9 about 2 years back  She has been treated for UTI multiple times recently.  Renal ultrasound suggestive of medical renal disease  Brief History:     Aracelis Jimenez is a 74 y.o. Non- / non  female who presents with Faliure to thrive  and Altered Mental Status   and is admitted 
Physical Therapy  DATE: 2024    NAME: Aracelis Jimenez  MRN: [id number]   : 1950    Patient not seen this date for Physical Therapy due to:      [] Cancel by RN or physician due to:    [] Hemodialysis    [] Critical Lab Value Level     [] Blood transfusion in progress    [] Acute or unstable cardiovascular status   _MAP < 55 or more than >115  _HR < 40 or > 130    [] Acute or unstable pulmonary status   -FiO2 > 60%   _RR < 5 or >40    _O2 sats < 85%    [] Strict Bedrest    [] Off Unit for surgery or procedure    [] Off Unit for testing       [] Pending imaging to R/O fracture    [x] Refusal by Patient Due to getting ready for discharge.     [] Other:       [] PT being discontinued at this time. Patient independent. No further needs.     [] PT being discontinued at this time as the patient has been transferred to hospice care. No further needs.      Criss Méndez, PT      
Physical Therapy  DATE: 2024    NAME: Aracelis Jimenez  MRN: [id number]   : 1950    Patient not seen this date for Physical Therapy due to:      [x] Cancel by RN or physician due to: Rakesh URRUTIA for now positive for Orthostatics and is waiting on DR.  Not appropriate at this time.    [] Hemodialysis    [] Critical Lab Value Level     [] Blood transfusion in progress    [] Acute or unstable cardiovascular status   _MAP < 55 or more than >115  _HR < 40 or > 130    [] Acute or unstable pulmonary status   -FiO2 > 60%   _RR < 5 or >40    _O2 sats < 85%    [] Strict Bedrest    [] Off Unit for surgery or procedure    [] Off Unit for testing       [] Pending imaging to R/O fracture    [] Refusal by Patient      [] Other      [] PT being discontinued at this time. Patient independent. No further needs.     [] PT being discontinued at this time as the patient has been transferred to hospice care. No further needs.      Lottie Dickey, PTA     
Physical Therapy  Facility/Department: [facility]  Rehabilitation Physical Therapy Treatment Note    NAME: Aracelis Jimenez  : 1950 (74 y.o.)  MRN: [id number]  CODE STATUS: Full Code    Date of Service: 24     Pt currently functioning below baseline.  Recommend daily inpatient skilled therapy at time of discharge to maximize long term outcomes and prevent re-admission. Please refer to AM-PAC score for current level of function.     Restrictions:  Restrictions/Precautions: General Precautions, Fall Risk, Contact Precautions  Position Activity Restriction  Other position/activity restrictions: Up w/ assist, Syncopal episodes w/o warning - mobilize cautiously     SUBJECTIVE  Subjective  Subjective: Patient up in bed upon therapists arrival.  Patient agreeable to PT treatment. RN Jennifer/Rylan Patient appropriate for PT treatment. Patient denies dizziness/lightheadedness however therapist notices a R hand tremor in ambulation.     OBJECTIVE  Cognition  Overall Cognitive Status: Exceptions  Arousal/Alertness: Appears intact  Following Commands: Follows multistep commands with repitition  Attention Span: Appears intact  Memory: Appears intact  Safety Judgement: Decreased awareness of need for safety;Decreased awareness of need for assistance  Problem Solving: Decreased awareness of errors;Assistance required to correct errors made;Assistance required to identify errors made  Insights: Decreased awareness of deficits  Initiation: Requires cues for some  Sequencing: Requires cues for some  Orientation  Overall Orientation Status: Within Functional Limits  Orientation Level: Oriented X4    Functional Mobility  Bed Mobility  Overall Assistance Level: Stand By Assist  Additional Factors: Set-up;Verbal cues;Head of bed raised;With handrails;Increased time to complete  Roll Left  Assistance Level: Supervision;Stand by assist  Roll Right  Assistance Level: Supervision;Stand by assist  Sit to Supine  Skilled Clinical 
Pt admitted to room 2020. Oriented to room, call light and bed mechanics in place. Side rails up x2. Call light within reach. Orders and med list reviewed.    
Spiritual Health History and Assessment/Progress Note  [facility]    (P) Spiritual/Emotional Needs, Initial Encounter,  ,  ,      Name: Aracelis Jimenez MRN: [id number]    Age: 74 y.o.     Sex: female   Language: English   Jewish: Denominational   ALPHONSE (acute kidney injury) ([facility])     Date: 11/5/2024            Total Time Calculated: (P) 13 min              Spiritual Assessment began in [facility]        Referral/Consult From: (P) Rounding   Encounter Overview/Reason: (P) Spiritual/Emotional Needs, Initial Encounter  Service Provided For: (P) Patient and family together    Patient has strong Presybeterian jennifer, Buddhism family support, and values prayer.    Jennifer, Belief, Meaning:   Patient is connected with a jennifer tradition or spiritual practice and has beliefs or practices that help with coping during difficult times  Family/Friends are connected with a jennifer tradition or spiritual practice and have beliefs or practices that help with coping during difficult times      Importance and Influence:  Patient has spiritual/personal beliefs that influence decisions regarding their health  Family/Friends have spiritual/personal beliefs that influence decisions regarding the patient's health    Community:  Patient feels well-supported. Support system includes: Children and Jennifer Community  Family/Friends feel well-supported. Support system includes: Other: siblings and other family members    Assessment and Plan of Care:     Patient Interventions include: Facilitated expression of thoughts and feelings, Explored spiritual coping/struggle/distress, Affirmed coping skills/support systems, and Facilitated life review and/ or legacy  Family/Friends Interventions include: Facilitated expression of thoughts and feelings, Explored spiritual coping/struggle/distress, Affirmed coping skills/support systems, and Facilitated life review and/or legacy    Patient Plan of Care: Spiritual Care available upon further 
[facility]  Office: [phone]  Ulises Yoo DO, Glenn Patterson DO, Fernando Wilson DO, Reji Gagnon DO, Radha Grayson MD, Shanell Denney MD, Juanis Zaragoza MD, Carmela Jennings MD,  Kali Harp MD, Mitra Willson MD, Alex Adair MD,  Pamela Hodge DO, Ar Pizano MD, Jaylen Sepulveda MD, Raoul Yoo DO, Katina Deras MD,  Lawrence Davila DO, Zamzam Osorio MD, Belinda Whalen MD, Crystal Rai MD, Lance Spence MD,  Wilson Granger MD, Dom Rogers MD, Bhavana Wyatt MD, Kylah Richmond MD, Humphrey Ureña MD, Guille Garcia MD, Pedro Hernandez DO, Yasir Parker MD, Shirley Waterhouse, CNP,  Sridevi Muñoz, CNP, Pedro Kuhn, ELMER,  Maria G Norman, AYDEE, Edelmira Oviedo, CNP, Niki Hernandez, CNP, Penelope Reyes, CNP, Shiela Oropeza, CNP, Doris Tolliver PAIvanC, CAROL KnoxC, Екатерина Thomas, CNP, Nena Baltazar, CNP,  Iliana Rivero, CNP, Jimena Castillo, CNP,  Kirsty Blanco, CNP, Rosaura Wolff, CNP         [facility]   IN-PATIENT SERVICE   [facility]    Progress Note    11/6/2024    12:23 PM    Name:   Aracelis Jimenez  MRN:     [id number]     Acct:      [id number]   Room:   2020/2020-01  IP Day:  3  Admit Date:  11/3/2024  3:28 PM    PCP:   Francisca Daniels MD  Code Status:  Full Code    Subjective:     C/C:   Chief Complaint   Patient presents with    Faliure to thrive     Altered Mental Status     Interval History Status: .   Was orthostatic while trying to get up and go to bathroom today again, was not wearing her compression stockings.  Denies dizziness at present  She uses walker at home but has been falling frequently  Current creatinine 1.8-1.5-1.4- 1.2, recent baseline is 1.1-1.3 which was 0.9 about 2 years back  She has been treated for UTI multiple times recently.  Urine culture growing E. Coli AEROCOCCUS URINAE >100,000 CFU/ML which is usually sensitive to penicillins and amoxicillin as reported in results.  Renal ultrasound suggestive of medical 
Writer spoke with  updated on patient status. Informed of patient second attempt to urinate and unable. Patient bladder scan 185ml. Dr. Lara states insert jones ok for discharge. Writer reviewed po atb at discharge for patient. Jane Meeks agreed states to f/u 1 week for cath removal  
Writer spoke with Dr. Parks updated on patient status. Informed of patient second attempt to urinate and unable. Patient bladder scan 185ml. Dr. Parks states ok for discharge BMP in 1 week and f/u 2-4 wks.  
[x] Medication Reconciliation was completed and the patient's home medication list was verified. The Med List Status has been marked \"Complete\". The following sources were used to assist with Medication Reconciliation:      [x] Home medications reviewed and confirmed with patient and daughter     
  RDW 13.5      MPV 9.5     Chemistry:  Recent Labs     11/03/24  1613 11/03/24  1815 11/04/24  0908 11/05/24  0518     --  137 138   K 4.1  --  4.4 3.9   CL 99  --  102 106   CO2 24  --  23 22   GLUCOSE 141*  --  113* 104*   BUN 31*  --  27* 23   CREATININE 1.8*  --  1.5* 1.4*   MG  --   --  2.4  --    ANIONGAP 13  --  12 10   LABGLOM 30*  --  37* 40*   CALCIUM 9.4  --  8.9 8.5*   TROPHS 34* 28*  --   --      Recent Labs     11/03/24  1613 11/03/24  2249 11/04/24  0609 11/04/24  0908 11/04/24  1113 11/04/24  1604 11/04/24  2014 11/05/24  0609 11/05/24  1122   TSH 0.18*  --   --   --   --   --   --   --   --    AST 43*  --   --  32*  --   --   --   --   --    ALT 40*  --   --  30  --   --   --   --   --    ALKPHOS 94  --   --  83  --   --   --   --   --    BILITOT 0.8  --   --  0.6  --   --   --   --   --    POCGLU  --    < > 86  --  153* 156* 151* 94 174*    < > = values in this interval not displayed.     ABG:No results found for: \"POCPH\", \"PHART\", \"PH\", \"POCPCO2\", \"FEW6TPV\", \"PCO2\", \"POCPO2\", \"PO2ART\", \"PO2\", \"POCHCO3\", \"NRP7IDT\", \"HCO3\", \"NBEA\", \"PBEA\", \"BEART\", \"BE\", \"THGBART\", \"THB\", \"FMX5DFX\", \"ZMTP9ENN\", \"W6PQIZBJ\", \"O2SAT\", \"FIO2\"  Lab Results   Component Value Date/Time    SPECIAL Site: Urine 11/03/2024 08:56 PM     Lab Results   Component Value Date/Time    CULTURE ESCHERICHIA COLI >100,000 CFU/ML (A) 11/03/2024 08:56 PM    CULTURE (A) 11/03/2024 08:56 PM     AEROCOCCUS URINAE >100,000 CFU/ML There are no CLSI interpretive guidelines for routine susceptibility testing.  Aerococcus species are reported to be susceptible to penicillin.  A. urinae has also been described as susceptible to amoxicillin and nitrofurantoin (for treatment of urinary tract infections only). Identification by MALDI-TOF         Radiology:  US RETROPERITONEAL COMPLETE    Result Date: 11/4/2024  1. Increased echogenicity of the bilateral renal cortex which can be seen with underlying chronic medical renal disease. 2. No 
1327)      Therapy Time   Individual Concurrent Group Co-treatment   Time In 1313         Time Out 1337         Minutes 24                 Haylie Houston, RASHEED     
Independent (Pt does microwave meals, granddaughter does laundry, [employer] delivery for groceries.)  Homemaking Responsibilities: Yes  Ambulation Assistance: Independent (Pt uses walker everywhere she goes.)  Transfer Assistance: Independent  Active : No  Patient's  Info: Daughter  Occupation: Retired  Type of Occupation: Stay at home  Leisure & Hobbies: Playing games on tablet.  IADL Comments: Pt sleeps on couch, pt has a bed but can not sleep in it.  Vision/Hearing  Vision  Vision: Impaired  Vision Exceptions: Wears glasses for reading  Hearing  Hearing: Within functional limits    Cognition   Orientation  Overall Orientation Status: Within Functional Limits  Cognition  Overall Cognitive Status: Exceptions  Following Commands: Follows multistep commands with repitition  Safety Judgement: Decreased awareness of need for safety;Decreased awareness of need for assistance  Problem Solving: Decreased awareness of errors;Assistance required to correct errors made;Assistance required to identify errors made  Insights: Decreased awareness of deficits  Sequencing: Requires cues for some    Objective  Observation/Palpation  Posture: Fair  Observation: BMI > 32  Gross Assessment  Sensation: Impaired (Pt reports chronic numbness/tingling in B feet)    AROM RLE (degrees)  RLE AROM: WFL  AROM LLE (degrees)  LLE AROM : WFL  AROM RUE (degrees)  RUE AROM : WFL  AROM LUE (degrees)  LUE AROM : WFL  Strength RLE  Strength RLE: WFL  Comment: Grossly 4- to 4/5  Strength LLE  Strength LLE: WFL  Comment: Grossly 4- to 4/5  Strength RUE  Strength RUE: WFL  Comment: See OT assessment for detail  Strength LUE  Strength LUE: WFL  Comment: See OT assessment for detail             Bed mobility  Supine to Sit: Stand by assistance  Sit to Supine: Minimal assistance  Scooting: Stand by assistance  Bed Mobility Comments: Pt w/ mild difficulty throughout bed mobility this date requiring increased time and effort to perform tasks

## 2024-11-06 NOTE — PLAN OF CARE
Jobst stocking order placed in. Waiting for it to be delivered. Dr Grayson updated about the latest orthostatics. Will continue to monitor the patient.    Problem: Chronic Conditions and Co-morbidities  Goal: Patient's chronic conditions and co-morbidity symptoms are monitored and maintained or improved  11/6/2024 1235 by Flora Blunt RN  Outcome: Progressing  Flowsheets (Taken 11/6/2024 0840)  Care Plan - Patient's Chronic Conditions and Co-Morbidity Symptoms are Monitored and Maintained or Improved:   Monitor and assess patient's chronic conditions and comorbid symptoms for stability, deterioration, or improvement   Update acute care plan with appropriate goals if chronic or comorbid symptoms are exacerbated and prevent overall improvement and discharge  11/6/2024 0613 by Kayleigh Roy RN  Outcome: Progressing     Problem: Discharge Planning  Goal: Discharge to home or other facility with appropriate resources  11/6/2024 1235 by Flora Blunt RN  Outcome: Progressing  Flowsheets (Taken 11/6/2024 0840)  Discharge to home or other facility with appropriate resources:   Identify barriers to discharge with patient and caregiver   Arrange for needed discharge resources and transportation as appropriate   Identify discharge learning needs (meds, wound care, etc)   Refer to discharge planning if patient needs post-hospital services based on physician order or complex needs related to functional status, cognitive ability or social support system  11/6/2024 0613 by Kayleigh Roy RN  Outcome: Progressing     Problem: Safety - Adult  Goal: Free from fall injury  11/6/2024 1235 by Flora Blunt RN  Outcome: Progressing  11/6/2024 0613 by Kayleigh Roy RN  Outcome: Progressing     Problem: Neurosensory - Adult  Goal: Achieves stable or improved neurological status  11/6/2024 1235 by Flora Blunt RN  Outcome: Progressing  11/6/2024 0613 by Kayleigh Roy RN  Outcome: Progressing     Problem:

## 2024-11-07 ENCOUNTER — HOSPITAL ENCOUNTER (OUTPATIENT)
Age: 74
Setting detail: SPECIMEN
Discharge: HOME | End: 2024-11-07

## 2024-11-07 LAB
ALBUMIN SERPL-MCNC: 3.2 G/DL (ref 3.5–5.2)
ALBUMIN/GLOB SERPL: 1.3 {RATIO} (ref 1–2.5)
ALP SERPL-CCNC: 61 U/L (ref 35–104)
ALT SERPL-CCNC: 18 U/L (ref 10–35)
ANION GAP SERPL CALCULATED.3IONS-SCNC: 10 MMOL/L (ref 9–16)
AST SERPL-CCNC: 24 U/L (ref 10–35)
BILIRUB SERPL-MCNC: 0.2 MG/DL (ref 0–1.2)
BUN SERPL-MCNC: 16 MG/DL (ref 8–23)
CALCIUM SERPL-MCNC: 9.2 MG/DL (ref 8.8–10.2)
CHLORIDE SERPL-SCNC: 108 MMOL/L (ref 98–107)
CHOLEST SERPL-MCNC: 93 MG/DL (ref 0–199)
CHOLESTEROL/HDL RATIO: 2.3
CO2 SERPL-SCNC: 22 MMOL/L (ref 20–31)
CREAT SERPL-MCNC: 1.1 MG/DL (ref 0.5–0.9)
ERYTHROCYTE [DISTWIDTH] IN BLOOD BY AUTOMATED COUNT: 13.5 % (ref 11.8–14.4)
EST. AVERAGE GLUCOSE BLD GHB EST-MCNC: 151 MG/DL
GFR, ESTIMATED: 52 ML/MIN/1.73M2
GLUCOSE SERPL-MCNC: 90 MG/DL (ref 82–115)
HBA1C MFR BLD: 6.9 % (ref 4–6)
HCT VFR BLD AUTO: 34.9 % (ref 36.3–47.1)
HDLC SERPL-MCNC: 40 MG/DL
HGB BLD-MCNC: 11.6 G/DL (ref 11.9–15.1)
LDLC SERPL CALC-MCNC: 25 MG/DL (ref 0–100)
MCH RBC QN AUTO: 31.1 PG (ref 25.2–33.5)
MCHC RBC AUTO-ENTMCNC: 33.2 G/DL (ref 28.4–34.8)
MCV RBC AUTO: 93.6 FL (ref 82.6–102.9)
NRBC BLD-RTO: 0 PER 100 WBC
PLATELET # BLD AUTO: 154 K/UL (ref 138–453)
PMV BLD AUTO: 10.4 FL (ref 8.1–13.5)
POTASSIUM SERPL-SCNC: 4 MMOL/L (ref 3.7–5.3)
PROT SERPL-MCNC: 5.8 G/DL (ref 6.6–8.7)
RBC # BLD AUTO: 3.73 M/UL (ref 3.95–5.11)
SODIUM SERPL-SCNC: 141 MMOL/L (ref 136–145)
T4 SERPL-MCNC: 8.6 UG/DL (ref 4.5–11.7)
TRIGL SERPL-MCNC: 138 MG/DL (ref 0–149)
TSH SERPL DL<=0.05 MIU/L-ACNC: 0.72 UIU/ML (ref 0.27–4.2)
VLDLC SERPL CALC-MCNC: 28 MG/DL (ref 1–30)
WBC OTHER # BLD: 9.1 K/UL (ref 3.5–11.3)

## 2024-11-07 PROCEDURE — 36415 COLL VENOUS BLD VENIPUNCTURE: CPT

## 2024-11-07 PROCEDURE — 84443 ASSAY THYROID STIM HORMONE: CPT

## 2024-11-07 PROCEDURE — 83036 HEMOGLOBIN GLYCOSYLATED A1C: CPT

## 2024-11-07 PROCEDURE — 80053 COMPREHEN METABOLIC PANEL: CPT

## 2024-11-07 PROCEDURE — 80061 LIPID PANEL: CPT

## 2024-11-07 PROCEDURE — 85027 COMPLETE CBC AUTOMATED: CPT

## 2024-11-07 PROCEDURE — P9603 ONE-WAY ALLOW PRORATED MILES: HCPCS

## 2024-11-07 PROCEDURE — 84436 ASSAY OF TOTAL THYROXINE: CPT

## 2024-11-08 ENCOUNTER — HOSPITAL ENCOUNTER (OUTPATIENT)
Age: 74
Setting detail: SPECIMEN
Discharge: HOME | End: 2024-11-08

## 2024-11-08 LAB
MICROORGANISM SPEC CULT: ABNORMAL
MICROORGANISM SPEC CULT: ABNORMAL
PREALB SERPL-MCNC: 14.6 MG/DL (ref 20–40)
SERVICE CMNT-IMP: ABNORMAL
SPECIMEN DESCRIPTION: ABNORMAL

## 2024-11-08 PROCEDURE — P9603 ONE-WAY ALLOW PRORATED MILES: HCPCS

## 2024-11-08 PROCEDURE — 84134 ASSAY OF PREALBUMIN: CPT

## 2024-11-08 PROCEDURE — 36415 COLL VENOUS BLD VENIPUNCTURE: CPT

## 2024-11-18 ENCOUNTER — HOSPITAL ENCOUNTER (OUTPATIENT)
Age: 74
Setting detail: SPECIMEN
Discharge: HOME | End: 2024-11-18

## 2024-11-19 ENCOUNTER — HOSPITAL ENCOUNTER (OUTPATIENT)
Age: 74
Setting detail: SPECIMEN
Discharge: HOME | End: 2024-11-19

## 2024-11-21 LAB
AMIKACIN: ABNORMAL
AMOXICILLIN + CLAVULANATE: ABNORMAL
AMPICILLIN: ABNORMAL
AZTREONAM: ABNORMAL
BACTERIA, URINE: ABNORMAL /HPF
BILIRUBIN, URINE: ABNORMAL
CEFAZOLIN: ABNORMAL
CEFEPIME: ABNORMAL
CEFOXITIN: ABNORMAL
CEFTRIAXONE: ABNORMAL
CHARACTER, URINE: ABNORMAL
CIPROFLOXACIN: ABNORMAL
COLOR, UA: ABNORMAL
CULTURE RESULT: ABNORMAL
ERTAPENEM: ABNORMAL
GENTAMICIN: ABNORMAL
GLUCOSE URINE: ABNORMAL MG/DL
IMIPENEM: ABNORMAL
KETONES, URINE: ABNORMAL MG/DL
LEUKOCYTE ESTERASE, URINE: ABNORMAL
LEVOFLOXACIN: ABNORMAL
MICROALBUMIN/CREAT 24H UR: ABNORMAL MG/DL
MUCUS, URINE: ABNORMAL /HPF
NITRITE, URINE: ABNORMAL
NITROFURANTOIN: ABNORMAL
OCCULT BLOOD,URINE: ABNORMAL
PH, URINE: 5.5
PIPERACILLIN + TAZOBACTAM: ABNORMAL
RBC URINE: ABNORMAL /HPF
SPECIFIC GRAVITY UA: 1.01
SQUAMOUS EPITHELIAL CELLS: ABNORMAL /HPF
TIGECYCLINE: ABNORMAL
TOBRAMYCIN: ABNORMAL
TRIMETHOPRIM + SULFAMETHOXAZOLE: ABNORMAL
URINE CULTURE, ROUTINE: ABNORMAL STATUS
UROBILINOGEN, URINE: 0.2 MG/DL
WBC URINE: ABNORMAL /HPF

## 2024-11-22 LAB
ANION GAP SERPL CALCULATED.3IONS-SCNC: NORMAL MMOL/L
BUN SERPL-MCNC: NORMAL MG/DL (ref 8–23)
BUN/CREAT SERPL: NORMAL (ref 9–20)
CALCIUM SERPL-MCNC: NORMAL MG/DL (ref 8.8–10.2)
CHLORIDE SERPL-SCNC: NORMAL MMOL/L (ref 98–107)
CO2 SERPL-SCNC: NORMAL MMOL/L (ref 20–31)
CREAT SERPL-MCNC: NORMAL MG/DL (ref 0.5–0.9)
GFR, ESTIMATED: NORMAL ML/MIN/1.73M2
GLUCOSE SERPL-MCNC: NORMAL MG/DL (ref 82–115)
POTASSIUM SERPL-SCNC: NORMAL MMOL/L (ref 3.7–5.3)
SODIUM SERPL-SCNC: NORMAL MMOL/L (ref 136–145)

## 2024-12-09 ENCOUNTER — HOSPITAL ENCOUNTER (OUTPATIENT)
Age: 74
Setting detail: SPECIMEN
Discharge: HOME | End: 2024-12-09

## 2025-02-03 ENCOUNTER — TELEPHONE (OUTPATIENT)
Dept: FAMILY MEDICINE CLINIC | Age: 75
End: 2025-02-03

## 2025-02-03 NOTE — TELEPHONE ENCOUNTER
----- Message from Benny TRAN sent at 1/31/2025 11:45 AM EST -----  Regarding: ECC Appointment Request  ECC Appointment Request    Patient needs appointment for ECC Appointment Type: New to Provider.    Patient Requested Dates(s): FEBRUARY THIS WEEK   Patient Requested Time: ANYTIME   Provider Name: Jennifer Bhatia, DARYL-CNP      Reason for Appointment Request: New Patient - Requested Provider unavailable  --------------------------------------------------------------------------------------------------------------------------    Relationship to Patient: Other DAUGHTER IESHA    Call Back Information: OK to leave message on voicemail  Preferred Call Back Number:  [phone]

## 2025-03-28 ENCOUNTER — APPOINTMENT (OUTPATIENT)
Dept: CT IMAGING | Age: 75
End: 2025-03-28
Payer: MEDICARE

## 2025-03-28 ENCOUNTER — APPOINTMENT (OUTPATIENT)
Age: 75
End: 2025-03-28
Payer: MEDICARE

## 2025-03-28 ENCOUNTER — APPOINTMENT (OUTPATIENT)
Dept: GENERAL RADIOLOGY | Age: 75
End: 2025-03-28
Payer: MEDICARE

## 2025-03-28 ENCOUNTER — APPOINTMENT (OUTPATIENT)
Dept: ULTRASOUND IMAGING | Age: 75
End: 2025-03-28
Payer: MEDICARE

## 2025-03-28 ENCOUNTER — HOSPITAL ENCOUNTER (INPATIENT)
Age: 75
LOS: 8 days | Discharge: NURSING, REHAB OR LONG-TERM CARE | End: 2025-04-05
Attending: EMERGENCY MEDICINE | Admitting: INTERNAL MEDICINE
Payer: MEDICARE

## 2025-03-28 DIAGNOSIS — E87.5 HYPERKALEMIA: ICD-10-CM

## 2025-03-28 DIAGNOSIS — G25.2 ACTION TREMOR: ICD-10-CM

## 2025-03-28 DIAGNOSIS — R56.9 SEIZURE-LIKE ACTIVITY (HCC): ICD-10-CM

## 2025-03-28 DIAGNOSIS — N17.9 ACUTE KIDNEY INJURY: Primary | ICD-10-CM

## 2025-03-28 DIAGNOSIS — R00.1 BRADYCARDIA: ICD-10-CM

## 2025-03-28 DIAGNOSIS — G92.8 TOXIC METABOLIC ENCEPHALOPATHY: ICD-10-CM

## 2025-03-28 PROBLEM — J96.90 RESPIRATORY FAILURE REQUIRING INTUBATION: Status: ACTIVE | Noted: 2025-03-28

## 2025-03-28 LAB
ALBUMIN SERPL-MCNC: 3.3 G/DL (ref 3.5–5.2)
ALBUMIN/GLOB SERPL: 1.3 {RATIO} (ref 1–2.5)
ALP SERPL-CCNC: 88 U/L (ref 35–104)
ALT SERPL-CCNC: 62 U/L (ref 10–35)
ANION GAP SERPL CALCULATED.3IONS-SCNC: 16 MMOL/L (ref 9–16)
ANION GAP SERPL CALCULATED.3IONS-SCNC: 8 MMOL/L (ref 9–16)
AST SERPL-CCNC: 59 U/L (ref 10–35)
BASOPHILS # BLD: 0.1 K/UL (ref 0–0.2)
BASOPHILS NFR BLD: 1 % (ref 0–2)
BILIRUB SERPL-MCNC: 0.2 MG/DL (ref 0–1.2)
BUN SERPL-MCNC: 36 MG/DL (ref 8–23)
BUN SERPL-MCNC: 36 MG/DL (ref 8–23)
CALCIUM SERPL-MCNC: 7.6 MG/DL (ref 8.8–10.2)
CALCIUM SERPL-MCNC: 8.9 MG/DL (ref 8.8–10.2)
CHLORIDE SERPL-SCNC: 103 MMOL/L (ref 98–107)
CHLORIDE SERPL-SCNC: 98 MMOL/L (ref 98–107)
CHLORIDE UR-SCNC: 61 MMOL/L
CHP ED QC CHECK: NORMAL
CO2 BLD CALC-SCNC: 25 MMOL/L (ref 22–30)
CO2 SERPL-SCNC: 15 MMOL/L (ref 20–31)
CO2 SERPL-SCNC: 28 MMOL/L (ref 20–31)
CREAT SERPL-MCNC: 1.5 MG/DL (ref 0.5–0.9)
CREAT SERPL-MCNC: 2 MG/DL (ref 0.5–0.9)
CREAT UR-MCNC: 137 MG/DL (ref 28–217)
ECHO AV MEAN GRADIENT: 3 MMHG
ECHO AV MEAN VELOCITY: 0.8 M/S
ECHO AV PEAK GRADIENT: 5 MMHG
ECHO AV PEAK VELOCITY: 1.2 M/S
ECHO AV VELOCITY RATIO: 0.67
ECHO AV VTI: 26.4 CM
ECHO BSA: 1.95 M2
ECHO LV E' LATERAL VELOCITY: 8.27 CM/S
ECHO LV E' SEPTAL VELOCITY: 4.79 CM/S
ECHO LV EF PHYSICIAN: 55 %
ECHO LVOT PEAK GRADIENT: 3 MMHG
ECHO LVOT PEAK VELOCITY: 0.8 M/S
ECHO MV A VELOCITY: 0.97 M/S
ECHO MV E DECELERATION TIME (DT): 250 MS
ECHO MV E VELOCITY: 0.73 M/S
ECHO MV E/A RATIO: 0.75
ECHO MV E/E' LATERAL: 8.83
ECHO MV E/E' RATIO (AVERAGED): 12.03
ECHO MV E/E' SEPTAL: 15.24
EKG Q-T INTERVAL: 538 MS
EKG QRS DURATION: 88 MS
EKG QTC CALCULATION (BAZETT): 427 MS
EKG R AXIS: 22 DEGREES
EKG T AXIS: 59 DEGREES
EKG VENTRICULAR RATE: 38 BPM
EOSINOPHIL # BLD: 0.39 K/UL (ref 0–0.44)
EOSINOPHILS RELATIVE PERCENT: 3 % (ref 1–4)
ERYTHROCYTE [DISTWIDTH] IN BLOOD BY AUTOMATED COUNT: 13.7 % (ref 11.8–14.4)
FIO2: 100
FIO2: 50
GFR, ESTIMATED: 26 ML/MIN/1.73M2
GFR, ESTIMATED: 37 ML/MIN/1.73M2
GLUCOSE BLD-MCNC: 166 MG/DL (ref 65–105)
GLUCOSE BLD-MCNC: 192 MG/DL (ref 65–105)
GLUCOSE BLD-MCNC: 242 MG/DL (ref 65–105)
GLUCOSE BLD-MCNC: 358 MG/DL
GLUCOSE BLD-MCNC: 358 MG/DL (ref 65–105)
GLUCOSE BLD-MCNC: 381 MG/DL (ref 65–105)
GLUCOSE SERPL-MCNC: 373 MG/DL (ref 82–115)
GLUCOSE SERPL-MCNC: 496 MG/DL (ref 82–115)
HCT VFR BLD AUTO: 39.9 % (ref 36.3–47.1)
HGB BLD-MCNC: 13 G/DL (ref 11.9–15.1)
IMM GRANULOCYTES # BLD AUTO: 0.06 K/UL (ref 0–0.3)
IMM GRANULOCYTES NFR BLD: 1 %
LACTATE BLDV-SCNC: 1.7 MMOL/L (ref 0.5–1.9)
LACTATE BLDV-SCNC: 2.5 MMOL/L (ref 0.5–1.9)
LACTATE BLDV-SCNC: 2.9 MMOL/L (ref 0.5–1.9)
LIPASE SERPL-CCNC: 90 U/L (ref 13–60)
LYMPHOCYTES NFR BLD: 4.97 K/UL (ref 1.1–3.7)
LYMPHOCYTES RELATIVE PERCENT: 43 % (ref 24–43)
MAGNESIUM SERPL-MCNC: 1.7 MG/DL (ref 1.6–2.4)
MAGNESIUM SERPL-MCNC: 2.2 MG/DL (ref 1.6–2.4)
MCH RBC QN AUTO: 31.1 PG (ref 25.2–33.5)
MCHC RBC AUTO-ENTMCNC: 32.6 G/DL (ref 28.4–34.8)
MCV RBC AUTO: 95.5 FL (ref 82.6–102.9)
MONOCYTES NFR BLD: 0.67 K/UL (ref 0.1–1.2)
MONOCYTES NFR BLD: 6 % (ref 3–12)
NEGATIVE BASE EXCESS, ART: 3.6 MMOL/L (ref 0–2)
NEUTROPHILS NFR BLD: 46 % (ref 36–65)
NEUTS SEG NFR BLD: 5.35 K/UL (ref 1.5–8.1)
NRBC BLD-RTO: 0 PER 100 WBC
PATIENT TEMP: 37
PLATELET # BLD AUTO: 184 K/UL (ref 138–453)
PMV BLD AUTO: 10 FL (ref 8.1–13.5)
POC HCO3: 20.2 MMOL/L (ref 21–28)
POC HCO3: 25.8 MMOL/L (ref 21–28)
POC O2 SATURATION: 100 % (ref 94–98)
POC O2 SATURATION: 99.7 % (ref 94–98)
POC PCO2: 27.3 MM HG (ref 35–48)
POC PCO2: 31.6 MM HG (ref 35–48)
POC PH: 7.41 (ref 7.35–7.45)
POC PH: 7.58 (ref 7.35–7.45)
POC PO2: 164.4 MM HG (ref 83–108)
POC PO2: 475.1 MM HG (ref 83–108)
POSITIVE BASE EXCESS, ART: 4.4 MMOL/L (ref 0–3)
POTASSIUM SERPL-SCNC: 3.2 MMOL/L (ref 3.7–5.3)
POTASSIUM SERPL-SCNC: 5.5 MMOL/L (ref 3.7–5.3)
POTASSIUM SERPL-SCNC: 6.1 MMOL/L (ref 3.7–5.3)
PROT SERPL-MCNC: 5.9 G/DL (ref 6.6–8.7)
RBC # BLD AUTO: 4.18 M/UL (ref 3.95–5.11)
SODIUM SERPL-SCNC: 134 MMOL/L (ref 136–145)
SODIUM SERPL-SCNC: 134 MMOL/L (ref 136–145)
SODIUM UR-SCNC: 115 MMOL/L
T3FREE SERPL-MCNC: 3.28 PG/ML (ref 2–4.4)
T4 FREE SERPL-MCNC: 1.1 NG/DL (ref 0.93–1.7)
TOTAL PROTEIN, URINE: 46 MG/DL
TROPONIN I SERPL HS-MCNC: 27 NG/L (ref 0–14)
TROPONIN I SERPL HS-MCNC: 36 NG/L (ref 0–14)
TSH SERPL DL<=0.05 MIU/L-ACNC: 21.1 UIU/ML (ref 0.27–4.2)
URINE TOTAL PROTEIN CREATININE RATIO: 0.34 (ref 0–0.2)
WBC OTHER # BLD: 11.5 K/UL (ref 3.5–11.3)

## 2025-03-28 PROCEDURE — 2580000003 HC RX 258

## 2025-03-28 PROCEDURE — 80053 COMPREHEN METABOLIC PANEL: CPT

## 2025-03-28 PROCEDURE — 83690 ASSAY OF LIPASE: CPT

## 2025-03-28 PROCEDURE — 84439 ASSAY OF FREE THYROXINE: CPT

## 2025-03-28 PROCEDURE — 93005 ELECTROCARDIOGRAM TRACING: CPT | Performed by: EMERGENCY MEDICINE

## 2025-03-28 PROCEDURE — 2580000003 HC RX 258: Performed by: EMERGENCY MEDICINE

## 2025-03-28 PROCEDURE — 94761 N-INVAS EAR/PLS OXIMETRY MLT: CPT

## 2025-03-28 PROCEDURE — 84300 ASSAY OF URINE SODIUM: CPT

## 2025-03-28 PROCEDURE — 31500 INSERT EMERGENCY AIRWAY: CPT

## 2025-03-28 PROCEDURE — 5A1223Z PERFORMANCE OF CARDIAC PACING, CONTINUOUS: ICD-10-PCS | Performed by: INTERNAL MEDICINE

## 2025-03-28 PROCEDURE — 36620 INSERTION CATHETER ARTERY: CPT

## 2025-03-28 PROCEDURE — 94002 VENT MGMT INPAT INIT DAY: CPT

## 2025-03-28 PROCEDURE — 3E043XZ INTRODUCTION OF VASOPRESSOR INTO CENTRAL VEIN, PERCUTANEOUS APPROACH: ICD-10-PCS | Performed by: HOSPITALIST

## 2025-03-28 PROCEDURE — 84484 ASSAY OF TROPONIN QUANT: CPT

## 2025-03-28 PROCEDURE — 93306 TTE W/DOPPLER COMPLETE: CPT | Performed by: INTERNAL MEDICINE

## 2025-03-28 PROCEDURE — 2500000003 HC RX 250 WO HCPCS: Performed by: EMERGENCY MEDICINE

## 2025-03-28 PROCEDURE — 6360000002 HC RX W HCPCS: Performed by: EMERGENCY MEDICINE

## 2025-03-28 PROCEDURE — 71045 X-RAY EXAM CHEST 1 VIEW: CPT

## 2025-03-28 PROCEDURE — 85025 COMPLETE CBC W/AUTO DIFF WBC: CPT

## 2025-03-28 PROCEDURE — 93010 ELECTROCARDIOGRAM REPORT: CPT | Performed by: INTERNAL MEDICINE

## 2025-03-28 PROCEDURE — 36415 COLL VENOUS BLD VENIPUNCTURE: CPT

## 2025-03-28 PROCEDURE — 80048 BASIC METABOLIC PNL TOTAL CA: CPT

## 2025-03-28 PROCEDURE — 92953 TEMPORARY EXTERNAL PACING: CPT

## 2025-03-28 PROCEDURE — 82947 ASSAY GLUCOSE BLOOD QUANT: CPT

## 2025-03-28 PROCEDURE — 37799 UNLISTED PX VASCULAR SURGERY: CPT

## 2025-03-28 PROCEDURE — 96375 TX/PRO/DX INJ NEW DRUG ADDON: CPT

## 2025-03-28 PROCEDURE — 2500000003 HC RX 250 WO HCPCS

## 2025-03-28 PROCEDURE — 33210 INSERT ELECTRD/PM CATH SNGL: CPT | Performed by: INTERNAL MEDICINE

## 2025-03-28 PROCEDURE — 6360000002 HC RX W HCPCS

## 2025-03-28 PROCEDURE — 96361 HYDRATE IV INFUSION ADD-ON: CPT

## 2025-03-28 PROCEDURE — 6370000000 HC RX 637 (ALT 250 FOR IP): Performed by: INTERNAL MEDICINE

## 2025-03-28 PROCEDURE — 76775 US EXAM ABDO BACK WALL LIM: CPT

## 2025-03-28 PROCEDURE — 84481 FREE ASSAY (FT-3): CPT

## 2025-03-28 PROCEDURE — C1751 CATH, INF, PER/CENT/MIDLINE: HCPCS

## 2025-03-28 PROCEDURE — 84132 ASSAY OF SERUM POTASSIUM: CPT

## 2025-03-28 PROCEDURE — 70450 CT HEAD/BRAIN W/O DYE: CPT

## 2025-03-28 PROCEDURE — 03HY32Z INSERTION OF MONITORING DEVICE INTO UPPER ARTERY, PERCUTANEOUS APPROACH: ICD-10-PCS | Performed by: EMERGENCY MEDICINE

## 2025-03-28 PROCEDURE — 87641 MR-STAPH DNA AMP PROBE: CPT

## 2025-03-28 PROCEDURE — 74176 CT ABD & PELVIS W/O CONTRAST: CPT

## 2025-03-28 PROCEDURE — 6370000000 HC RX 637 (ALT 250 FOR IP): Performed by: EMERGENCY MEDICINE

## 2025-03-28 PROCEDURE — 82570 ASSAY OF URINE CREATININE: CPT

## 2025-03-28 PROCEDURE — 93306 TTE W/DOPPLER COMPLETE: CPT

## 2025-03-28 PROCEDURE — 36410 VNPNXR 3YR/> PHY/QHP DX/THER: CPT

## 2025-03-28 PROCEDURE — 5A1955Z RESPIRATORY VENTILATION, GREATER THAN 96 CONSECUTIVE HOURS: ICD-10-PCS | Performed by: EMERGENCY MEDICINE

## 2025-03-28 PROCEDURE — 51702 INSERT TEMP BLADDER CATH: CPT

## 2025-03-28 PROCEDURE — 96365 THER/PROPH/DIAG IV INF INIT: CPT

## 2025-03-28 PROCEDURE — 2500000003 HC RX 250 WO HCPCS: Performed by: INTERNAL MEDICINE

## 2025-03-28 PROCEDURE — 2000000000 HC ICU R&B

## 2025-03-28 PROCEDURE — 0BH17EZ INSERTION OF ENDOTRACHEAL AIRWAY INTO TRACHEA, VIA NATURAL OR ARTIFICIAL OPENING: ICD-10-PCS | Performed by: EMERGENCY MEDICINE

## 2025-03-28 PROCEDURE — 05H933Z INSERTION OF INFUSION DEVICE INTO RIGHT BRACHIAL VEIN, PERCUTANEOUS APPROACH: ICD-10-PCS | Performed by: HOSPITALIST

## 2025-03-28 PROCEDURE — 2580000003 HC RX 258: Performed by: INTERNAL MEDICINE

## 2025-03-28 PROCEDURE — 83605 ASSAY OF LACTIC ACID: CPT

## 2025-03-28 PROCEDURE — 99223 1ST HOSP IP/OBS HIGH 75: CPT | Performed by: INTERNAL MEDICINE

## 2025-03-28 PROCEDURE — 84156 ASSAY OF PROTEIN URINE: CPT

## 2025-03-28 PROCEDURE — 99223 1ST HOSP IP/OBS HIGH 75: CPT | Performed by: SURGERY

## 2025-03-28 PROCEDURE — 82436 ASSAY OF URINE CHLORIDE: CPT

## 2025-03-28 PROCEDURE — 82374 ASSAY BLOOD CARBON DIOXIDE: CPT

## 2025-03-28 PROCEDURE — 76937 US GUIDE VASCULAR ACCESS: CPT

## 2025-03-28 PROCEDURE — 82803 BLOOD GASES ANY COMBINATION: CPT

## 2025-03-28 PROCEDURE — 83735 ASSAY OF MAGNESIUM: CPT

## 2025-03-28 PROCEDURE — 99285 EMERGENCY DEPT VISIT HI MDM: CPT

## 2025-03-28 PROCEDURE — 84443 ASSAY THYROID STIM HORMONE: CPT

## 2025-03-28 PROCEDURE — 2700000000 HC OXYGEN THERAPY PER DAY

## 2025-03-28 RX ORDER — METOPROLOL SUCCINATE 100 MG/1
100 TABLET, EXTENDED RELEASE ORAL DAILY
Status: ON HOLD | COMMUNITY
Start: 2025-03-13 | End: 2025-04-04 | Stop reason: HOSPADM

## 2025-03-28 RX ORDER — DOPAMINE HYDROCHLORIDE 160 MG/100ML
1-20 INJECTION, SOLUTION INTRAVENOUS CONTINUOUS
Status: DISCONTINUED | OUTPATIENT
Start: 2025-03-28 | End: 2025-04-03

## 2025-03-28 RX ORDER — ACETAMINOPHEN 650 MG/1
650 SUPPOSITORY RECTAL EVERY 6 HOURS PRN
Status: DISCONTINUED | OUTPATIENT
Start: 2025-03-28 | End: 2025-04-05 | Stop reason: HOSPADM

## 2025-03-28 RX ORDER — DEXTROSE MONOHYDRATE 100 MG/ML
INJECTION, SOLUTION INTRAVENOUS CONTINUOUS PRN
Status: DISCONTINUED | OUTPATIENT
Start: 2025-03-28 | End: 2025-04-05 | Stop reason: HOSPADM

## 2025-03-28 RX ORDER — SODIUM CHLORIDE 0.9 % (FLUSH) 0.9 %
5-40 SYRINGE (ML) INJECTION PRN
Status: DISCONTINUED | OUTPATIENT
Start: 2025-03-28 | End: 2025-04-05 | Stop reason: HOSPADM

## 2025-03-28 RX ORDER — ATROPINE SULFATE 0.1 MG/ML
0.5 INJECTION INTRAVENOUS ONCE
Status: COMPLETED | OUTPATIENT
Start: 2025-03-28 | End: 2025-03-28

## 2025-03-28 RX ORDER — 0.9 % SODIUM CHLORIDE 0.9 %
1000 INTRAVENOUS SOLUTION INTRAVENOUS ONCE
Status: COMPLETED | OUTPATIENT
Start: 2025-03-28 | End: 2025-03-28

## 2025-03-28 RX ORDER — INSULIN GLARGINE 100 [IU]/ML
10 INJECTION, SOLUTION SUBCUTANEOUS DAILY
Status: DISCONTINUED | OUTPATIENT
Start: 2025-03-28 | End: 2025-03-30

## 2025-03-28 RX ORDER — SODIUM CHLORIDE 9 MG/ML
INJECTION, SOLUTION INTRAVENOUS PRN
Status: DISCONTINUED | OUTPATIENT
Start: 2025-03-28 | End: 2025-04-03

## 2025-03-28 RX ORDER — CALCIUM CHLORIDE 100 MG/ML
1000 INJECTION INTRAVENOUS; INTRAVENTRICULAR ONCE
Status: COMPLETED | OUTPATIENT
Start: 2025-03-28 | End: 2025-03-28

## 2025-03-28 RX ORDER — MIDAZOLAM HYDROCHLORIDE 1 MG/ML
1 INJECTION, SOLUTION INTRAMUSCULAR; INTRAVENOUS EVERY 30 MIN PRN
Status: DISCONTINUED | OUTPATIENT
Start: 2025-03-28 | End: 2025-03-31

## 2025-03-28 RX ORDER — FENTANYL CITRATE-0.9 % NACL/PF 20 MCG/2ML
50 SYRINGE (ML) INTRAVENOUS EVERY 30 MIN PRN
Refills: 0 | Status: DISCONTINUED | OUTPATIENT
Start: 2025-03-28 | End: 2025-03-31

## 2025-03-28 RX ORDER — INSULIN LISPRO 100 [IU]/ML
6 INJECTION, SOLUTION INTRAVENOUS; SUBCUTANEOUS
COMMUNITY
Start: 2024-12-26

## 2025-03-28 RX ORDER — LIDOCAINE HYDROCHLORIDE 10 MG/ML
50 INJECTION, SOLUTION EPIDURAL; INFILTRATION; INTRACAUDAL; PERINEURAL ONCE
Status: DISCONTINUED | OUTPATIENT
Start: 2025-03-28 | End: 2025-04-02

## 2025-03-28 RX ORDER — SODIUM CHLORIDE 0.9 % (FLUSH) 0.9 %
5-40 SYRINGE (ML) INJECTION EVERY 12 HOURS SCHEDULED
Status: DISCONTINUED | OUTPATIENT
Start: 2025-03-28 | End: 2025-04-05 | Stop reason: HOSPADM

## 2025-03-28 RX ORDER — MAGNESIUM OXIDE 400 MG/1
400 TABLET ORAL DAILY
Status: ON HOLD | COMMUNITY
End: 2025-04-04 | Stop reason: HOSPADM

## 2025-03-28 RX ORDER — ACETAMINOPHEN 325 MG/1
650 TABLET ORAL EVERY 6 HOURS PRN
Status: DISCONTINUED | OUTPATIENT
Start: 2025-03-28 | End: 2025-04-05 | Stop reason: HOSPADM

## 2025-03-28 RX ORDER — AMLODIPINE BESYLATE 5 MG/1
5 TABLET ORAL DAILY
Status: ON HOLD | COMMUNITY
Start: 2025-02-28 | End: 2025-04-04 | Stop reason: HOSPADM

## 2025-03-28 RX ORDER — CALCIUM GLUCONATE 20 MG/ML
1000 INJECTION, SOLUTION INTRAVENOUS ONCE
Status: COMPLETED | OUTPATIENT
Start: 2025-03-28 | End: 2025-03-28

## 2025-03-28 RX ORDER — INSULIN LISPRO 100 [IU]/ML
0-8 INJECTION, SOLUTION INTRAVENOUS; SUBCUTANEOUS
Status: DISCONTINUED | OUTPATIENT
Start: 2025-03-28 | End: 2025-03-28

## 2025-03-28 RX ORDER — NOREPINEPHRINE BITARTRATE 0.06 MG/ML
1-100 INJECTION, SOLUTION INTRAVENOUS CONTINUOUS
Status: DISCONTINUED | OUTPATIENT
Start: 2025-03-28 | End: 2025-03-28

## 2025-03-28 RX ORDER — SUCCINYLCHOLINE CHLORIDE 20 MG/ML
INJECTION INTRAMUSCULAR; INTRAVENOUS DAILY PRN
Status: COMPLETED | OUTPATIENT
Start: 2025-03-28 | End: 2025-03-28

## 2025-03-28 RX ORDER — SODIUM CHLORIDE 9 MG/ML
INJECTION, SOLUTION INTRAVENOUS PRN
Status: DISCONTINUED | OUTPATIENT
Start: 2025-03-28 | End: 2025-04-05 | Stop reason: HOSPADM

## 2025-03-28 RX ORDER — INSULIN LISPRO 100 [IU]/ML
0-8 INJECTION, SOLUTION INTRAVENOUS; SUBCUTANEOUS EVERY 6 HOURS
Status: DISCONTINUED | OUTPATIENT
Start: 2025-03-28 | End: 2025-04-02

## 2025-03-28 RX ORDER — LISINOPRIL AND HYDROCHLOROTHIAZIDE 10; 12.5 MG/1; MG/1
1 TABLET ORAL DAILY
Status: ON HOLD | COMMUNITY
Start: 2025-01-22 | End: 2025-04-04 | Stop reason: HOSPADM

## 2025-03-28 RX ORDER — SODIUM CHLORIDE 9 MG/ML
INJECTION, SOLUTION INTRAVENOUS CONTINUOUS
Status: DISCONTINUED | OUTPATIENT
Start: 2025-03-28 | End: 2025-04-02

## 2025-03-28 RX ORDER — HEPARIN SODIUM 5000 [USP'U]/ML
5000 INJECTION, SOLUTION INTRAVENOUS; SUBCUTANEOUS EVERY 8 HOURS SCHEDULED
Status: DISCONTINUED | OUTPATIENT
Start: 2025-03-28 | End: 2025-04-05 | Stop reason: HOSPADM

## 2025-03-28 RX ADMIN — INSULIN HUMAN 15 UNITS: 100 INJECTION, SOLUTION PARENTERAL at 16:49

## 2025-03-28 RX ADMIN — CEFEPIME 2000 MG: 2 INJECTION, POWDER, FOR SOLUTION INTRAVENOUS at 11:08

## 2025-03-28 RX ADMIN — INSULIN GLARGINE 10 UNITS: 100 INJECTION, SOLUTION SUBCUTANEOUS at 12:33

## 2025-03-28 RX ADMIN — GLUCAGON 2 MG: KIT at 04:41

## 2025-03-28 RX ADMIN — Medication 50 MCG: at 22:17

## 2025-03-28 RX ADMIN — DEXTROSE 250 ML: 10 SOLUTION INTRAVENOUS at 04:18

## 2025-03-28 RX ADMIN — SODIUM CHLORIDE, PRESERVATIVE FREE 10 ML: 5 INJECTION INTRAVENOUS at 22:36

## 2025-03-28 RX ADMIN — SODIUM BICARBONATE: 84 INJECTION, SOLUTION INTRAVENOUS at 16:00

## 2025-03-28 RX ADMIN — CEFEPIME 1000 MG: 1 INJECTION, POWDER, FOR SOLUTION INTRAMUSCULAR; INTRAVENOUS at 22:06

## 2025-03-28 RX ADMIN — SODIUM CHLORIDE: 0.9 INJECTION, SOLUTION INTRAVENOUS at 19:37

## 2025-03-28 RX ADMIN — ATROPINE SULFATE 0.5 MG: 0.1 INJECTION INTRAVENOUS at 03:18

## 2025-03-28 RX ADMIN — WATER 1000 MG: 1 INJECTION INTRAMUSCULAR; INTRAVENOUS; SUBCUTANEOUS at 07:32

## 2025-03-28 RX ADMIN — AZITHROMYCIN MONOHYDRATE 500 MG: 500 INJECTION, POWDER, LYOPHILIZED, FOR SOLUTION INTRAVENOUS at 07:35

## 2025-03-28 RX ADMIN — Medication 100 MG: at 02:52

## 2025-03-28 RX ADMIN — CALCIUM CHLORIDE 1000 MG: 100 INJECTION INTRAVENOUS; INTRAVENTRICULAR at 16:49

## 2025-03-28 RX ADMIN — INSULIN LISPRO 8 UNITS: 100 INJECTION, SOLUTION INTRAVENOUS; SUBCUTANEOUS at 12:33

## 2025-03-28 RX ADMIN — INSULIN HUMAN 10 UNITS: 100 INJECTION, SOLUTION PARENTERAL at 04:16

## 2025-03-28 RX ADMIN — HEPARIN SODIUM 5000 UNITS: 5000 INJECTION INTRAVENOUS; SUBCUTANEOUS at 22:21

## 2025-03-28 RX ADMIN — MIDAZOLAM HYDROCHLORIDE 1 MG: 1 INJECTION, SOLUTION INTRAMUSCULAR; INTRAVENOUS at 19:52

## 2025-03-28 RX ADMIN — Medication 75 MCG/HR: at 19:56

## 2025-03-28 RX ADMIN — SODIUM BICARBONATE: 84 INJECTION, SOLUTION INTRAVENOUS at 05:43

## 2025-03-28 RX ADMIN — VANCOMYCIN HYDROCHLORIDE 2500 MG: 5 INJECTION, POWDER, LYOPHILIZED, FOR SOLUTION INTRAVENOUS at 10:58

## 2025-03-28 RX ADMIN — CALCIUM GLUCONATE 1000 MG: 20 INJECTION, SOLUTION INTRAVENOUS at 04:40

## 2025-03-28 RX ADMIN — HEPARIN SODIUM 5000 UNITS: 5000 INJECTION INTRAVENOUS; SUBCUTANEOUS at 15:01

## 2025-03-28 RX ADMIN — SODIUM ZIRCONIUM CYCLOSILICATE 10 G: 10 POWDER, FOR SUSPENSION ORAL at 04:24

## 2025-03-28 RX ADMIN — MIDAZOLAM HYDROCHLORIDE 2 MG/HR: 5 INJECTION, SOLUTION INTRAMUSCULAR; INTRAVENOUS at 03:10

## 2025-03-28 RX ADMIN — DOPAMINE HYDROCHLORIDE IN DEXTROSE 5 MCG/KG/MIN: 1.6 INJECTION, SOLUTION INTRAVENOUS at 22:39

## 2025-03-28 RX ADMIN — SODIUM CHLORIDE 1000 ML: 0.9 INJECTION, SOLUTION INTRAVENOUS at 03:06

## 2025-03-28 RX ADMIN — SODIUM CHLORIDE, PRESERVATIVE FREE 10 ML: 5 INJECTION INTRAVENOUS at 10:51

## 2025-03-28 ASSESSMENT — PULMONARY FUNCTION TESTS
PIF_VALUE: 30
PIF_VALUE: 30
PIF_VALUE: 31
PIF_VALUE: 30
PIF_VALUE: 31
PIF_VALUE: 30
PIF_VALUE: 28
PIF_VALUE: 30
PIF_VALUE: 31
PIF_VALUE: 29
PIF_VALUE: 28
PIF_VALUE: 28
PIF_VALUE: 27
PIF_VALUE: 30
PIF_VALUE: 30
PIF_VALUE: 31
PIF_VALUE: 27

## 2025-03-28 NOTE — PROGRESS NOTES
4 Eyes Skin Assessment     NAME:  Aracelis Jimenez  YOB: 1950  MEDICAL RECORD NUMBER:  [id number]    The patient is being assessed for  Admission    I agree that at least one RN has performed a thorough Head to Toe Skin Assessment on the patient. ALL assessment sites listed below have been assessed.      Areas assessed by both nurses:    Head, Face, Ears, Shoulders, Back, Chest, Arms, Elbows, Hands, Sacrum. Buttock, Coccyx, Ischium, Legs. Feet and Heels, and Under Medical Devices         Does the Patient have a Wound? No noted wound(s)- excoration noted in bilateral groin sites       Connor Prevention initiated by RN: Yes  Wound Care Orders initiated by RN: no    Pressure Injury (Stage 3,4, Unstageable, DTI, NWPT, and Complex wounds) if present, place Wound referral order by RN under : No    New Ostomies, if present place, Ostomy referral order under : No     Nurse 1 eSignature: Electronically signed by Poli Mello RN on 3/28/25 at 6:43 PM EDT    **SHARE this note so that the co-signing nurse can place an eSignature**    Nurse 2 eSignature: {Esignature:[id number]}

## 2025-03-28 NOTE — CARE COORDINATION
Post Acute Facility/Agency List     Provided child with the following list, the list includes the overall star ratings obtained from CMS per the Medicare Web site (www.Medicare.gov):     [] Long Term Acute Care Facilities  [] Acute Inpatient Rehabilitation Facilities  [x] Skilled Nursing Facilities  [] Hospice Facilities  [] Home Care    Provided verbal instructions on how to utilize the QR Code to obtain additional detailed star ratings from www.Medicare.gov     offered to print and provide the detailed list:    []Accepted   [x]Declined    Wants [facility]. Referral sent.

## 2025-03-28 NOTE — ED NOTES
Patient brought in by EMS states daughter told them patient was trying to go to the bathroom and become altered. Patient has a hx of stroke in the past. On arrival by EMS patient bp was 78 palp. Hr 32,ox 100 on non-rebreather. Patient blood glucose was 446. EMS gave patient 1 of atropine and 2mg of versed prior to arrival. Patient was unresponsive and being bagged by EMS on arrival.

## 2025-03-28 NOTE — CONSULTS
Pulmonary Medicine and Critical Care Consult    Patient - Aracelis Jimenez   MRN -  [id number]   Acct # - [id number]   - 1950      Date of Admission -  3/28/2025  2:47 AM  Date of evaluation -  3/28/2025  Room - -   Hospital Day - 0  Consulting - Shanell Denney MD Primary Care Physician - Francisca Daniels MD     Reason for Consult      Respiratory failure  Assessment/recommendations     Acute hypoxic respiratory failure  Assist-control ventilation FiO2 50% PEEP 8 respiratory 20 tidal volume 550  Sedation with Versed RASS -2 wean last  Sedation with fentanyl RASS -2 wean first  Hold off tube feeds    Bradycardia ?  Related to beta-blocker/sick sinus syndrome status post transvenous pacemaker  Cardiology on consult/check echocardiogram  Monitor heart rate/paced at this point  Off beta-blockers  Midline insertion    Pneumonia?  Aspiration/atelectasis   Vanco cefepime empirically/check sputum culture/check procalcitonin  Check MRSA swab consider stopping Vanco if negative  Repeat chest x-ray    hyperkalemia/ ALPHONSE on CKD/hyponatremia  Decrease bicarb to 75 an hour please let repeat potassium VBG  Nephrology on consult/ultrasound kidneys    Hyperglycemia with diabetes/hypothyroidism  Insulin restarted/monitor blood sugar abuse/insulin scale    History of stroke/peripheral vascular disease  Obesity suspect sleep apnea  Outpatient sleep evaluation    discussed with RN  Discussed with son at bedside  Peptic ulcer disease prophylaxis  DVT prophylaxis    Problem List      Patient Active Problem List   Diagnosis    Primary hypertension    Mixed hyperlipidemia    Type 2 diabetes mellitus with hyperglycemia, with long-term current use of insulin (HCC)    Acute ischemic stroke (HCC)    Hypothyroidism    Cerebrovascular accident (CVA) (HCC)-2 years back    CKD stage 3 due to type 2 diabetes mellitus (HCC)    Chronic diarrhea    Acute cystitis    ALPHONSE (acute kidney injury)    Facial droop    Intracranial vascular

## 2025-03-28 NOTE — PLAN OF CARE
Problem: Safety - Medical Restraint  Goal: Remains free of injury from restraints (Restraint for Interference with Medical Device)  Description: INTERVENTIONS:  1. Determine that other, less restrictive measures have been tried or would not be effective before applying the restraint  2. Evaluate the patient's condition at the time of restraint application  3. Inform patient/family regarding the reason for restraint  4. Q2H: Monitor safety, psychosocial status, comfort, nutrition and hydration  3/28/2025 1813 by Poli Mello RN  Outcome: Progressing  3/28/2025 1811 by Poli Mello RN  Outcome: Progressing     Problem: Safety - Adult  Goal: Free from fall injury  3/28/2025 1813 by Poli Mello RN  Outcome: Progressing  3/28/2025 1811 by Poli Mello RN  Outcome: Progressing     Problem: Discharge Planning  Goal: Discharge to home or other facility with appropriate resources  3/28/2025 1813 by Poli Mello RN  Outcome: Progressing  3/28/2025 1811 by Poli Mello RN  Outcome: Progressing     Problem: Skin/Tissue Integrity  Goal: Skin integrity remains intact  Description: 1.  Monitor for areas of redness and/or skin breakdown  2.  Assess vascular access sites hourly  3.  Every 4-6 hours minimum:  Change oxygen saturation probe site  4.  Every 4-6 hours:  If on nasal continuous positive airway pressure, respiratory therapy assess nares and determine need for appliance change or resting period  3/28/2025 1813 by Poli Mello RN  Outcome: Progressing  3/28/2025 1811 by Poli Mello RN  Outcome: Progressing     Problem: ABCDS Injury Assessment  Goal: Absence of physical injury  3/28/2025 1813 by Poli Mello RN  Outcome: Progressing  3/28/2025 1811 by Poli Mello RN  Outcome: Progressing     Problem: Neurosensory - Adult  Goal: Achieves stable or improved neurological status  Outcome: Progressing     Problem: Respiratory - Adult  Goal: Achieves optimal ventilation and oxygenation  Outcome:  Progressing     Problem: Cardiovascular - Adult  Goal: Maintains optimal cardiac output and hemodynamic stability  Outcome: Progressing  Goal: Absence of cardiac dysrhythmias or at baseline  Outcome: Progressing     Problem: Musculoskeletal - Adult  Goal: Return mobility to safest level of function  Outcome: Progressing     Problem: Genitourinary - Adult  Goal: Urinary catheter remains patent  Outcome: Progressing     Problem: Metabolic/Fluid and Electrolytes - Adult  Goal: Electrolytes maintained within normal limits  Outcome: Progressing  Goal: Hemodynamic stability and optimal renal function maintained  Outcome: Progressing  Goal: Glucose maintained within prescribed range  Outcome: Progressing     Problem: Gastrointestinal - Adult  Goal: Maintains adequate nutritional intake  Outcome: Not Progressing

## 2025-03-28 NOTE — PROGRESS NOTES
Insert Arterial Line  Date/Time:  03/28/25, 4:14 AM  Performed by: Stevenson Chaudhary RCP    Patient identity confirmed: arm band and provided demographic data   Time out: Immediately prior to procedure a \"time out\" was called to verify the correct patient, procedure, equipment, support staff.    Preparation: Patient was prepped and draped in the usual sterile fashion.    Location:left radial    Chidi's test normal: yes  Needle gauge: 20     Number of attempts: 2  Post-procedure: transparent dressing applied and line secured    Patient tolerance: well

## 2025-03-28 NOTE — H&P
[facility]  Office: [phone]  Ulises Yoo DO, Glenn Patterson DO, Fernando Wilson DO, Reji Gagnon DO, Radha Grayson MD, Shanell Denney MD, Juanis Zaragoza MD, Carmela Jennings MD,  Kali Harp MD, Mitra Willson MD, Alex Adair MD,  Pamela Hodge DO, Ar Pizano MD, Jaylen Sepulveda MD, Raoul Yoo DO, Katina Deras MD,  Lawrence Davlia DO, Belinda Whalen MD, Crystal Rai MD, Lance Spence MD,  Wilson Granger MD, Dom Rogers MD, Bhavana Wyatt MD, Kylah Richmond MD, Humphrey Ureña MD, Guille Garcia MD, Pedro Hernandez DO, Yasir Parker MD, Pamela Patterson MD, Mohsin Reza, MD, Shirley Waterhouse, CNP,  Sridevi Muñoz, CNP, Pedro Kuhn, CNP,  Maria G Norman, DNP, Edelmira Oviedo, CNP, Niki Hernandez, CNP, Penelope Reyes, CNP, Shiela Oropeza, CNP, Doris Tolliver PA-C, Екатерина Thomas, CNP, Nena Baltazar, CNP,  Iliana Rivero, CNP, Simona Foley, CNP, Jimena Castillo, CNP,  Veronica Hogue, CNS, Miladys Matamoros, CNP, Kirsty Blanco, CNP,   Rosaura Wolff, CNP         [facility]   IN-PATIENT SERVICE   [facility]    HISTORY AND PHYSICAL EXAMINATION            Date:   3/28/2025  Patient name:  Aracelis Jimenez  Date of admission:  3/28/2025  2:47 AM  MRN:   [id number]  Account:  [id number]  YOB: 1950  PCP:    Francisca Daniels MD  Room:   2027/2027-01  Code Status:    Full Code    Chief Complaint:     Chief Complaint   Patient presents with    Altered Mental Status       History Obtained From:     electronic medical record    History of Present Illness:     Aracelis Jimenez is a 74 y.o. Non- / non  female who presents with Altered Mental Status   and is admitted to the hospital for the management of Respiratory failure requiring intubation.    Pt is a 74 yr old female with a PMH HTN, DM2, hypothyroid, CKD 3, Depression, and stroke who presented to the ED with bradycardia and altered mental status.  Per her son and daughter, she had been  reactive, extraocular eye movements intact, conjunctiva clear  Ear: normal external ear, no discharge, hearing intact  Nose:  no drainage noted  Mouth: mucous membranes moist, ETT  Neck: supple, no carotid bruits, thyroid not palpable  Lungs: Bilateral equal air entry, clear to auscultation, no wheezing, rales or rhonchi, normal effort  Cardiovascular: normal rate, regular rhythm, no murmur, gallop, rub.  Abdomen: Soft, nontender, nondistended, normal bowel sounds, no hepatomegaly or splenomegaly  Skin: No gross lesions, rashes, bruising or bleeding on exposed skin area  Extremities:  peripheral pulses palpable, no pedal edema or calf pain with palpation      Investigations:      Laboratory Testing:  Recent Results (from the past 24 hours)   EKG 12 Lead (Abn HR)    Collection Time: 03/28/25  2:55 AM   Result Value Ref Range    Ventricular Rate 38 BPM    QRS Duration 88 ms    Q-T Interval 538 ms    QTc Calculation (Bazett) 427 ms    R Axis 22 degrees    T Axis 59 degrees   TSH    Collection Time: 03/28/25  3:06 AM   Result Value Ref Range    TSH 21.10 (H) 0.27 - 4.20 uIU/mL   T4, Free    Collection Time: 03/28/25  3:06 AM   Result Value Ref Range    T4 Free 1.1 0.93 - 1.70 ng/dL   Troponin    Collection Time: 03/28/25  3:06 AM   Result Value Ref Range    Troponin, High Sensitivity 36 (H) 0 - 14 ng/L   Magnesium    Collection Time: 03/28/25  3:06 AM   Result Value Ref Range    Magnesium 2.2 1.6 - 2.4 mg/dL   CBC with Auto Differential    Collection Time: 03/28/25  3:06 AM   Result Value Ref Range    WBC 11.5 (H) 3.5 - 11.3 k/uL    RBC 4.18 3.95 - 5.11 m/uL    Hemoglobin 13.0 11.9 - 15.1 g/dL    Hematocrit 39.9 36.3 - 47.1 %    MCV 95.5 82.6 - 102.9 fL    MCH 31.1 25.2 - 33.5 pg    MCHC 32.6 28.4 - 34.8 g/dL    RDW 13.7 11.8 - 14.4 %    Platelets 184 138 - 453 k/uL    MPV 10.0 8.1 - 13.5 fL    NRBC Automated 0.0 0.0 per 100 WBC    Neutrophils % 46 36 - 65 %    Lymphocytes % 43 24 - 43 %    Monocytes % 6 3 - 12 %     61 mmol/L   Protein / creatinine ratio, urine    Collection Time: 03/28/25  3:58 PM   Result Value Ref Range    Total Protein, Urine 46 mg/dL    Creatinine, Ur 137.0 28.0 - 217.0 mg/dL    Urine Total Protein Creatinine Ratio 0.34 (H) 0.00 - 0.20   Sodium, urine, random    Collection Time: 03/28/25  3:58 PM   Result Value Ref Range    Sodium, Ur 115 mmol/L   POC Glucose Fingerstick    Collection Time: 03/28/25  4:25 PM   Result Value Ref Range    POC Glucose 242 (H) 65 - 105 mg/dL   Arterial Blood Gas, POC    Collection Time: 03/28/25  4:37 PM   Result Value Ref Range    POC pH 7.583 (H) 7.350 - 7.450    POC pCO2 27.3 (L) 35.0 - 48.0 mm Hg    POC PO2 164.4 (H) 83.0 - 108.0 mm Hg    POC HCO3 25.8 21.0 - 28.0 mmol/L    Positive Base Excess, Art 4.4 (H) 0.0 - 3.0 mmol/L    POC O2 SAT 99.7 (H) 94.0 - 98.0 %    FIO2 50.0    TOTAL CO2    Collection Time: 03/28/25  4:37 PM   Result Value Ref Range    POC TCO2 25 22 - 30 mmol/L   POC Glucose Fingerstick    Collection Time: 03/28/25  6:05 PM   Result Value Ref Range    POC Glucose 192 (H) 65 - 105 mg/dL   POC Glucose Fingerstick    Collection Time: 03/28/25  8:28 PM   Result Value Ref Range    POC Glucose 166 (H) 65 - 105 mg/dL       Imaging/Diagnostics:  XR CHEST PORTABLE  Result Date: 3/28/2025  1. Endotracheal tube and NG tube in proper positions. 2. Small bilateral pleural effusions and mild interstitial pulmonary edema. 3. Curvilinear opacity within the right lung base likely reflects atelectasis.     CT ABDOMEN PELVIS WO CONTRAST Additional Contrast? None  Result Date: 3/28/2025  1. Dependent opacities in the lung bases compatible with subsegmental atelectasis versus consolidation. 2. Enteric tube within the stomach. The stomach is distended with gas and ingested material. 3. Mild stool burden throughout the colon.     CT Head W/O Contrast  Result Date: 3/28/2025  No acute intracranial abnormality.       Assessment :      Hospital Problems           Last Modified POA

## 2025-03-28 NOTE — PROCEDURES
PROCEDURE NOTE  Date: 3/28/2025   Name: Aracelis Jimenez  YOB: 1950    Procedures    Temporary pacemaker placed at bedside in ER from left subclavian approach. Full sterile precautions.    Consent obtained form 3 family members. Dr Orozco and I both agree that this is a reasonable decision and is emergent and necessary to prolong life with minimal risk.    Successful pacemaker placement. HR 70bpm.    Thank you for allowing me to participate in the care of this patient, please do not hesitate to call if you have any questions.    Bryan Masters DO, [facility]  Board Certified Cardiologist  Fellow of the American College of Cardiology    Obesity & Weight Loss Medicine   of Medicine [facility]   of Medicine [facility]  [facility]  [phone]

## 2025-03-28 NOTE — PROGRESS NOTES
Changed LDA to pacing wires to more appropriately reflect patients access. Central line was discontinued in LDA documentation due to inaccurate description of access.

## 2025-03-28 NOTE — PROCEDURES
PROCEDURE NOTE  Date: 3/28/2025   Name: Aracelis Jimenez  YOB: 1950    Procedures        Midline insertion note:      Prescribed therapy: Incompatible IV medications, IV ABXs, Stable access  Product type: 5.5fr dual lumen Antimicrobial/Antithrombogenic Arrow Midline  History/Labs/Allergies Reviewed  Placed By: [facility] IV Team    Time out Performed using Two Identifiers  Ultrasound assessment performed.   Insertion site: Right Brachial vein   Catheter size: 5.5 Luxembourgish  Total length: 15cm (blue flex tip).   External catheter length: 0 cm  Arm circ at insertion site: 37 cm  Number of attempts: 1  Estimated blood loss: 1 ml  Placement verified by: positive blood return & flushes easily  Special equipment used: ultrasound & micro-introducer technique   Catheter securement: Adhesive securement device  Catheter adhesive (SecureportIV) utilized at insertion site  Dressing applied: Tegaderm CHG  Lidocaine administered intradermally conc.1%: approx 1 mL     Midline education:   Dual lumen midline placed. Do not give incompatible medications. Utilize distal port primarily. Check and document blood return. Please call VAT with any issues.      Midline education:   [ x] Post care line insertion was discussed with patient/Family or POA prior to procedure.  Risks, benefits, alternatives, and reason for procedure were discussed and teaching was reinforced. An educational handout on post insertion line care and maintenance was left at bedside with patient or in chart. Patient (Family or POA) acknowledged understanding of information relayed.

## 2025-03-28 NOTE — PROGRESS NOTES
ADMISSION NOTE         Patient admitted to room: 2027     Time of admit: 0847     Admit from: ER      Reason for admission: Resp. Failure resulting in intubation/ bradycardia      Where patient has been residing for the last 24 hrs: home      Has the patient been admitted to any facility in the last 4 weeks, which one:  no      Family at bedside: Yes     Patient is currently: resting in bed comfortably, vitals obtained, telemetry placed on pt. No distress noted. Patient has been oriented to room, educated on how to use call light, and to call for assistance prior to getting up. Bed in lowest and locked position. 2 siderails up for safety. Call light within reach.

## 2025-03-28 NOTE — CONSULTS
Renal Consult Note    Patient :  Aracelis Jimenez; 74 y.o. MRN# [id number]  Location:  2027/2027-01  Attending:  Shanell Denney MD  Admit Date:  3/28/2025   Hospital Day: 0    Reason for Consult:     Asked by Shanell Viera MD to see for ALPHONSE/Elevated Creatinine, hyperkalemia.    History Obtained From:     Electronic medical record, attending physician.    History of Present Illness:     Aracelis Jimenez; 74 y.o. female with past medical history of diabetes type 2, hypertension, CKD 3 likely due to diabetic and hypertensive nephrosclerosis with baseline creatinine seems to be around 1.3-1.5 mg/dl and follows up in our clinic last seen DARYL Muñoz - CNP 12/2024, history of recurrent E. coli UTIs, hyperlipidemia, history of orthostatic hypotension, bladder incontinence versus neurogenic bladder presented to the hospital with the chief complaint of altered mental status.  EMS was originally called in due to abdomen pain patient was sitting on the toilet and apparently was having difficulty with bowel movement and started developing abdominal pain, EMS was called and was found to be bradycardic and hypotensive.  Patient was given atropine and later required transcutaneous pacing.  In the hospital cardiology was consulted and was found to have junctional bradycardia, continued on temporary pacer and dopamine drip and also cardiology did consult EP as well.  BMP on admission showed sodium 134, potassium 6.1, chloride 103, bicarb 15, calcium 8.9, BUN 36, creatinine 2.0 mg/dl.  Patient did receive acute hyperkalemia management in the ED which did include insulin dextrose, Lokelma and started on sodium bicarbonate drip.  Later patient did require mechanical ventilation.  CT abdomen pelvis without IV contrast 3/28/2025 showed dependent opacities in the lung bases compatible with subsegmental atelectasis versus consolidation.  Enteric tube within the stomach.  Stomach was distended with gas and ingested material.  Mild  light.  Neck:   Supple  Chest:   Bilateral vesicular breath sounds, no rales or wheezes.  Cardiac:  S1 S2 RR, no murmurs, gallops or rubs.  Abdomen: Soft, no masses or organomegaly, BS audible.  :   No suprapubic or flank tenderness.  Neuro:   On mechanical ventilation, sedated.  SKIN:  No rashes, good skin turgor.  Extremities:  No edema.    Labs:       Recent Labs     03/28/25  0306   WBC 11.5*   RBC 4.18   HGB 13.0   HCT 39.9   MCV 95.5   MCH 31.1   MCHC 32.6   RDW 13.7      MPV 10.0      BMP:   Recent Labs     03/28/25  0306 03/28/25  0544 03/28/25  0615   *  --   --    K 6.1*  --  5.5*     --   --    CO2 15*  --   --    BUN 36*  --   --    CREATININE 2.0*  --   --    GLUCOSE 373* 358  --    CALCIUM 8.9  --   --       Magnesium:    Recent Labs     03/28/25  0306   MG 2.2     OLIVIA:      Lab Results   Component Value Date/Time    OLIVIA NEGATIVE 11/04/2024 11:13 AM     SPEP:  Lab Results   Component Value Date/Time    ALBCAL 3.2 11/04/2024 11:13 AM    ALBPCT 52 11/04/2024 11:13 AM    A1PCT 5 11/04/2024 11:13 AM    A2PCT 17 11/04/2024 11:13 AM    BETAPCT 11 11/04/2024 11:13 AM    GAMGLOB 0.9 11/04/2024 11:13 AM    GGPCT 15 11/04/2024 11:13 AM    PATH ELECTRONICALLY SIGNED. KAROLINA PIMENTEL MD 11/04/2024 11:13 AM     C3:     Lab Results   Component Value Date/Time    C3 146 11/04/2024 11:13 AM     C4:     Lab Results   Component Value Date/Time    C4 23 11/04/2024 11:13 AM     MPO ANCA:     Lab Results   Component Value Date/Time    MPO <0.3 11/04/2024 11:13 AM     PR3 ANCA:     Lab Results   Component Value Date/Time    PR3 <0.7 11/04/2024 11:13 AM     Urinalysis/Chemistries:      Lab Results   Component Value Date/Time    NITRU POS 11/19/2024 05:23 PM    COLORU P YEL 11/19/2024 05:23 PM    PHUR 5.5 11/19/2024 05:23 PM    PHUR 5.5 05/28/2022 06:04 PM    WBCUA 0-4 11/19/2024 05:23 PM    RBCUA 0-2 11/19/2024 05:23 PM    MUCUS OCC 11/19/2024 05:23 PM    BACTERIA MANY 11/19/2024 05:23 PM    BACTERIA

## 2025-03-28 NOTE — CONSULTS
[facility]   Pharmacy Pharmacokinetic Monitoring Service - Vancomycin     Aracelis Jimenez is a 74 y.o. female starting on vancomycin therapy for sepsis.   Pharmacy consulted by Nena Baltazar CNP for monitoring and adjustment.    Target Concentration: Dosing based on anticipated concentration <15 mg/L due to renal impairment/insufficiency    Additional Antimicrobials: cefepime    Pertinent Laboratory Values:   Wt Readings from Last 1 Encounters:   03/28/25 99.8 kg (220 lb)     Temp Readings from Last 1 Encounters:   03/28/25 97 °F (36.1 °C) (Oral)     Estimated Creatinine Clearance: 28 mL/min (A) (based on SCr of 2 mg/dL (H)).  Recent Labs     03/28/25  0306   CREATININE 2.0*   BUN 36*   WBC 11.5*     Procalcitonin: none    Pertinent Cultures: none    MRSA Nasal Swab: N/A. Non-respiratory infection.    Plan:  Concentration-guided dosing due to renal impairment/insufficiency  Give vancomycin 2500mg x1 dose today  Vancomycin concentration ordered for 3/29/25 @ 1000 (~24 hours after dose)  Further dosing to follow level results   Pharmacy will continue to monitor patient and adjust therapy as indicated    Thank you for the consult,  Nancy Street RPH  3/28/2025 8:47 AM

## 2025-03-28 NOTE — FLOWSHEET NOTE
Dr. Fitch notified that we had lost capture on the transvenous pacemaker. Dr. Fitch went to bedside and evaluated patient. Patient had underlying sinus vince rhythm and was maintaining BP. Dr. Fitch deflated the balloon and removed the pacer wires.

## 2025-03-28 NOTE — ED NOTES
ED TO INPATIENT SBAR HANDOFF    Patient Name: Aracelis Jimenez   :  1950  74 y.o.   MRN:  [id number]  Preferred Name    ED Room #:  [facility]22/22  Family/Caregiver Present yes   Restraints no   Sitter no   Sepsis Risk Score      Situation  Code Status: Prior No additional code details.    Allergies: Morphine, Metformin, Sulfa antibiotics, and Adhesive tape  Weight: Patient Vitals for the past 96 hrs (Last 3 readings):   Weight   25 0420 99.8 kg (220 lb)     Arrived from: home  Chief Complaint:   Chief Complaint   Patient presents with    Altered Mental Status     Hospital Problem/Diagnosis:  Principal Problem:    Respiratory failure requiring intubation (HCC)  Resolved Problems:    * No resolved hospital problems. *    Imaging:   CT ABDOMEN PELVIS WO CONTRAST Additional Contrast? None   Final Result   1. Dependent opacities in the lung bases compatible with subsegmental   atelectasis versus consolidation.   2. Enteric tube within the stomach. The stomach is distended with gas and   ingested material.   3. Mild stool burden throughout the colon.         CT Head W/O Contrast   Final Result   No acute intracranial abnormality.         XR CHEST PORTABLE   Preliminary Result   1. Endotracheal tube and NG tube in proper positions.   2. Small bilateral pleural effusions and mild interstitial pulmonary edema.   3. Curvilinear opacity within the right lung base likely reflects atelectasis.           Abnormal labs:   Abnormal Labs Reviewed   TSH - Abnormal; Notable for the following components:       Result Value    TSH 21.10 (*)     All other components within normal limits   TROPONIN - Abnormal; Notable for the following components:    Troponin, High Sensitivity 36 (*)     All other components within normal limits   CBC WITH AUTO DIFFERENTIAL - Abnormal; Notable for the following components:    WBC 11.5 (*)     Immature Granulocytes % 1 (*)     Lymphocytes Absolute 4.97 (*)     All other components within normal limits  medications reviewed and confirmed with   [] Contacted patient's pharmacy to confirm home medications  [] Contacted patient's physician office to confirm home medications  [] Medical Records from another facility and/or Care Everywhere were reviewed  [] MAR from facility requested to be faxed over  [] Unable to complete due to patient condition  [] Unable to validate home medications      [] There are one or more home medications that need clarification before Medication Reconciliation can be completed. The Med List Status has been marked as In Progress.     To assist with Home Medication Reconciliation the following actions have been taken:    [] Family requested to bring medications into the hospital  [] Family requested to call hospital with medication list  [] Message left with physician office  [] Request for medical records made to   [] Other     Electronically signed by: Electronically signed by Yessica Hernández RN on 3/28/2025 at 7:13 AM

## 2025-03-28 NOTE — PROGRESS NOTES
Comprehensive Nutrition Assessment    Type and Reason for Visit:  Nutrition support, Consult (Tube feeding ordering and management)    Nutrition Recommendations/Plan:   NPO diet  No tube feeding start at this time due to abdominal distention  Monitor GI function, nutrition progression, vent status and labs  Start tube feedings when appropriate. Recommendation is for Vital AF 1.2 Mono goal rate 55 mL/hr (1320 mL total volume) and 30 mL every 4 hours     Malnutrition Assessment:  Malnutrition Status:  At risk for malnutrition (03/28/25 1840)        Nutrition Assessment:    Patient admission is related to respiratory failure requiring intubation. Patient was complaining of abdominal pain at home and was having difficulty on the toilet having a bowel movement and EMS was called. Patient was obtunded in ER and was not able to protect her airway so she was intubated. Due to cardiac dysfunction cardiology placed a transvenous pacemaker at bedside in the ER. Patient has a medical history for diabates, HTN and tremors of nervous system. Nutrition consult received for TF ordering and management for mechanical vent patient. Patient has abdominal distention and nasogastric tube is on low intermittent wall suction. Pulmonology does not want to start tube feedings at this time. Start tube feedings when appropriate. Recommend Vital AF 1.2 Mono at goal rate 55 mL/hr (1320 mL total volume) and 30 mL every 4 hours. Monitor nutrition progression, GI function, vent status and labs.    Nutrition Related Findings:    No edema. Active bowel sounds. Intubated and sedated on Versed and Fentanyl. NG tube-LIWS Wound Type: None       Current Nutrition Intake & Therapies:    Average Meal Intake: NPO  Average Supplements Intake: NPO  Diet NPO    Anthropometric Measures:  Height: 157.5 cm (5' 2\")  Ideal Body Weight (IBW): 110 lbs (50 kg)       Current Body Weight: 87 kg (191 lb 12.8 oz), 174.4 % IBW. Weight Source: Not specified  Current BMI  (kg/m2): 35.1                             BMI Categories: Obese Class 2 (BMI 35.0 -39.9)    Estimated Daily Nutrient Needs:  Energy Requirements Based On: Formula  Weight Used for Energy Requirements: Current  Energy (kcal/day): 1605 kcal ([place] [employer] 2010)  Weight Used for Protein Requirements: Ideal  Protein (g/day): 70-85 gm of protein (1.4-1.7 gm/kg)  Method Used for Fluid Requirements: 1 ml/kcal  Fluid (ml/day): 1600 mL    Nutrition Diagnosis:   Inadequate oral intake related to impaired respiratory function as evidenced by NPO or clear liquid status due to medical condition, intubation    Nutrition Interventions:   Food and/or Nutrient Delivery: Continue NPO  Nutrition Education/Counseling: Education/Counseling not indicated  Coordination of Nutrition Care: Continue to monitor while inpatient       Goals:  Goals: Initiate nutrition support, within 2 days          Nutrition Monitoring and Evaluation:      Food/Nutrient Intake Outcomes: Progression of Nutrition  Physical Signs/Symptoms Outcomes: Biochemical Data, Fluid Status or Edema, Skin, Weight, GI Status, Constipation    Discharge Planning:    Too soon to determine         Miladys SHERN, RDN, LDN  Lead Clinical Dietitian  RD Office Phone [phone]

## 2025-03-28 NOTE — CARE COORDINATION
Case Management Assessment  Initial Evaluation    Date/Time of Evaluation: 3/28/2025 11:53 AM  Assessment Completed by: SAM PATE RN    If patient is discharged prior to next notation, then this note serves as note for discharge by case management.    Patient Name: Aracelis Jimenez                   YOB: 1950  Diagnosis: Hyperkalemia [E87.5]  Bradycardia [R00.1]  Acute kidney injury [N17.9]  Respiratory failure requiring intubation [J96.90]                   Date / Time: 3/28/2025  2:47 AM    Patient Admission Status: Inpatient   Readmission Risk (Low < 19, Mod (19-27), High > 27): Readmission Risk Score: 15.5    Current PCP: Francisca Daniels MD  PCP verified by CM? Yes    Chart Reviewed: Yes      History Provided by: Child/Family  Patient Orientation: Sedated    Patient Cognition: Other (see comment) (intubated)    Hospitalization in the last 30 days (Readmission):  No    If yes, Readmission Assessment in  Navigator will be completed.    Advance Directives:      Code Status: Full Code   Patient's Primary Decision Maker is: Legal Next of Kin    Primary Decision Maker: Zohaib Centenoy - Child - [phone]    Secondary Decision Maker: Jamar Jimenez - Child - [phone]    Discharge Planning:    Patient lives with: Alone Type of Home: Apartment  Primary Care Giver: Self  Patient Support Systems include: Children, Family Members   Current Financial resources: None  Current community resources: None  Current services prior to admission: Durable Medical Equipment            Current DME: Shower Chair, Walker (rollator)            Type of Home Care services:  OT, PT, Skilled Therapy, Nursing Services    ADLS  Prior functional level: Independent in ADLs/IADLs  Current functional level: Assistance with the following:, Bathing, Dressing, Toileting, Feeding, Cooking, Housework, Shopping, Mobility    PT AM-PAC:   /24  OT AM-PAC:   /24    Family can provide assistance at DC: Yes  Would you like Case  RN  Case Management Department

## 2025-03-28 NOTE — FLOWSHEET NOTE
03/28/25 1457   Pacemaker   Pacemaker Yes   Pacemaker Type Transcutaneous   Pacer Mode AAI   Temporary Wires Transcutaneous   Atrial Wire Status Unipolar wire(s)   Temp Pacemaker Pads On and not connected   Pad Status In place   Atrial Output (milliamps) (S)  15 milliamps   Sensitivity 0.4   Capturing Appropriately Yes   Sensing Appropriately Yes   Pacemaker Set Rate (beats/min) 70 BPM     Notified Cardiology NP that we have steadily required an increase in MA's to maintain a 1:1 capture.

## 2025-03-28 NOTE — CONSULTS
[facility]    Consult                        Today's Date: 3/28/2025  Patient Name: Aracelis Jimenez  Date of admission: 3/28/2025  2:47 AM  Patient's age: 74 y.o., 1950  Admission Dx: Respiratory failure requiring intubation [J96.90]    Reason for Consult:  Cardiac evaluation    Requesting Physician: Shanell Denney MD    CHIEF COMPLAINT:  bradycardia     History Obtained From:      HISTORY OF PRESENT ILLNESS:      HPI  Patient presented to the emergency room secondary to altered mental status.  EMS was called secondary to abdominal pain.  Patient was sitting on the toilet and having difficulty having a bowel movement.  On arrival by EMS patient was found to be bradycardic and hypotensive.  Patient had been given atropine without getting frequent improvement in her heart rate and blood pressure.  Patient was then given Versed by EMS and transcutaneous pacing was started.  On arrival to the emergency room patient's mental status is decreased and patient is not able to protect her airway.  Patient was therefore intubated.  Cardiology was consulted for junctional bradycardia ,patient seen and examined with family at the bedside per family her PCP had increased her Lopressor to 100 mg daily from 25 mg p.o. twice daily    Past Medical History:   has a past medical history of Depression, Diabetes mellitus (HCC), Hypertension, and Tremors of nervous system.    Past Surgical History:   has a past surgical history that includes Hysterectomy; Cholecystectomy; and Rib fracture surgery (2022).     Home Medications:    Prior to Admission medications    Medication Sig Start Date End Date Taking? Authorizing Provider   levothyroxine (SYNTHROID) 50 MCG tablet Take 1 tablet by mouth daily 11/22/24   Provider, MD Regi   metoprolol tartrate (LOPRESSOR) 25 MG tablet Take 2 tablets by mouth 2 times daily Hold for systolic blood pressure less than 120 or heart rate less than 55 11/6/24   Radha Grayson MD    Diagnostics:    EKG: Narrative & Impression     Critical Test Result: Low HR  Junctional bradycardia  Possible Inferior infarct , age undetermined  Abnormal ECG  When compared with ECG of 03-NOV-2024 15:31,  Junctional rhythm has replaced Sinus rhythm  Vent. rate has decreased BY  28 BPM  Borderline criteria for Inferior infarct are now Present  T wave inversion no longer evident in Lateral leads   Specimen Collected: 03/28/25 02:55 EDT Last Resulted: 03/28/25 07:03 EDT   .  Echo complete W/O contrast 7/2024   Order: [id number]  Narrative      Left Ventricle: Left ventricle appears normal in size. There is mild  increased wall thickness/hypertrophy. Systolic function is normal with an  ejection fraction of 60-65%. No segmental wall motion abnormalities.    Right Ventricle: Right ventricular size appears normal. The right  ventricular basal diameter is 30.0 mm. Systolic function is normal.    Tricuspid Valve: RVSP estimated at 35-40 mmHg.        Stress test IMPRESSION: 2021     1.  Normal LexiScan enhanced myocardial perfusion exam, without evidence of myocardial ischemia.     2.  Normal left ventricle wall motion and ejection fraction.     3.  Low risk for significant cardiovascular event.       Event monitor  Order: [id number]  Narrative    30-day event monitor from 6/29/2023-7/28/2023 was reviewed  The patient endorsed “skipped beat” while in sinus rhythm at 79 bpm during  light activity  No atrial fibrillation, sustained SVT or bradyarrhythmias  No symptoms reported by the patient  Exam End: 06/27/23 07:42 Last Resulted: 08/02/23 08:29   Received From: [facility]  Result Received: 06/15/24 09:39        Labs:     CBC:   Recent Labs     03/28/25  0306   WBC 11.5*   HGB 13.0   HCT 39.9        BMP:   Recent Labs     03/28/25  0306 03/28/25  0544 03/28/25  0615   *  --   --    K 6.1*  --  5.5*   CO2 15*  --   --    BUN 36*  --   --    CREATININE 2.0*  --   --    LABGLOM 26*  --   --     GLUCOSE 373* 358  --      BNP: No results for input(s): \"BNP\" in the last 72 hours.  PT/INR: No results for input(s): \"PROTIME\", \"INR\" in the last 72 hours.  APTT:No results for input(s): \"APTT\" in the last 72 hours.  CARDIAC ENZYMES:No results for input(s): \"CKTOTAL\", \"CKMB\", \"CKMBINDEX\", \"TROPONINI\" in the last 72 hours.  FASTING LIPID PANEL:  Lab Results   Component Value Date/Time    HDL 40 11/07/2024 08:15 AM    TRIG 305 05/28/2022 03:30 PM     LIVER PROFILE:  Recent Labs     03/28/25  0306   AST 59*   ALT 62*       IMPRESSION:    Junctional Bradycardia - temporary pacemaker placed in AM at ^AM by Dr. Masters. Paced at 70bpm  Acute respiratory failure   AMS  Hyperkalemia  ALPHONSE/CKD   Carotid  disease     RECOMMENDATIONS:  Continue temporary pacer  Continue dopamine drip  Will consult our EP Dr. Resendez  Will obtain echo  Ischemic workup pending neurology recovery       Discussed with patient and Nurse.  Electronically signed by DARYL Niño NP on 3/28/2025 at 8:14 AM      Attending Cardiologist Addendum: I have reviewed the history, physical, subjective, objective, assessment, and plan with the CNP and agree with the note. I have made changes to the note above as needed.    Thank you for allowing me to participate in the care of this patient, please do not hesitate to call if you have any questions.    Bryan Masters DO, FAC  Board Certified Cardiologist  Fellow of the American College of Cardiology    Obesity & Weight Loss Medicine   of Medicine [facility]   of Medicine [facility]  [facility]  [phone]

## 2025-03-28 NOTE — ED PROVIDER NOTES
[facility] EMERGENCY DEPARTMENT  eMERGENCY dEPARTMENT eNCOUnter    Pt Name: Aracelis Jimenez  MRN: [id number]  Birthdate 1950  Date of evaluation: 3/28/25  CHIEF COMPLAINT       Chief Complaint   Patient presents with    Altered Mental Status     HISTORY OF PRESENT ILLNESS   HPI  Patient presented to the emergency room secondary to altered mental status.  EMS was called secondary to abdominal pain.  Patient was sitting on the toilet and having difficulty having a bowel movement.  On arrival by EMS patient was found to be bradycardic and hypotensive.  Patient had been given atropine without getting frequent improvement in her heart rate and blood pressure.  Patient was then given Versed by EMS and transcutaneous pacing was started.  On arrival to the emergency room patient's mental status is decreased and patient is not able to protect her airway.  Patient was therefore intubated.  REVIEW OF SYSTEMS     I am unable to obtain complete review of systems secondary to patient's mental status  PAST MEDICAL HISTORY     Past Medical History:   Diagnosis Date    Depression     Diabetes mellitus (HCC)     Hypertension     Tremors of nervous system 2020    being evaluated for parkinsons     SURGICAL HISTORY       Past Surgical History:   Procedure Laterality Date    CHOLECYSTECTOMY      HYSTERECTOMY (CERVIX STATUS UNKNOWN)      RIB FRACTURE SURGERY  2022    TT     CURRENT MEDICATIONS       Previous Medications    ASPIRIN 81 MG CHEWABLE TABLET    Take 1 tablet by mouth daily    ATORVASTATIN (LIPITOR) 40 MG TABLET    Take 1 tablet by mouth daily    BUPROPION (WELLBUTRIN XL) 300 MG EXTENDED RELEASE TABLET    Take 1 tablet by mouth every morning    ELASTIC BANDAGES & SUPPORTS (JOBST KNEE HIGH COMPRESSION SM) MISC    1 each by Does not apply route daily as needed (prologed standing)    GABAPENTIN (NEURONTIN) 300 MG CAPSULE    Take 1 capsule by mouth daily.    INSULIN GLARGINE (LANTUS) 100 UNIT/ML INJECTION VIAL    Inject 15  [facility] VIDEO VISIT PROGRESS NOTE     CHIEF COMPLAINT:   Chief Complaint   Patient presents with   • Video Visit   • Eye Problem      think I have pink eye. My eyes are a bit pink, itchy, have drainage, and a bit crusty this morning.   • Video Visit       HISTORY OF PRESENT ILLNESS:  Merly Talamantes is a 68 year old/female who is requesting a Video Visit (V-Visit) for evaluation of red eye.   Patient reports eye redness, discharge.  Patient indicates yesterday she noted her eyes were itchy.  Today now she notes her eyes are pink tinge left worse than right, eyes are increasingly itchy with some crusty discharge on her lashes.  No URI symptoms, no runny nose or cough.  No known exposure to bacterial pinkeye.  There is no known trauma to the eye.  She has no foreign body sensation.    The patient does not use contact lenses.The patient denies upper respiratory symptoms, eye itching, deep eyeball pain, changes in vision, photophobia, pain with eyeball movement, eye swelling, headache, nausea, vomiting, or fever.       REVIEW OF SYSTEMS:     A review of systems was performed and findings relevant to this complaint are included in the HPI.      HISTORIES:  I have personally reviewed and updated the following electronic medical record sections: Current medications, Allergies, Problem list, Past Medical History, Past Surgical History, and Social History    ALLERGIES:   ALLERGIES:   Allergen Reactions   • Penicillins HIVES          MEDICTIONS:   Current Outpatient Medications   Medication Sig   • ketotifen (ZADITOR) 0.025 % ophthalmic solution Place 1 drop into both eyes in the morning and 1 drop in the evening.   • fluticasone (FLONASE) 50 MCG/ACT nasal spray Spray 2 sprays in each nostril daily. Use BID for 1 week then daily.   • propranolol (INDERAL LA) 60 MG 24 hr capsule Take 60 mg by mouth daily. Indications: rapid heart rate      No current facility-administered medications for this visit.           OBJECTIVE:    PHYSICAL EXAM:     Information acquired with patient assistance, demonstration and feedback due to two-way video visit method of visit.    CONSTITUTIONAL: Well-hydrated, well-nourished female who appears to be in no acute distress. Awake, alert and cooperative.    NEUROLOGIC: Alert and oriented x 3. CN grossly intact.     SKIN: Skin normal color, no visual texture change, with no lesions or eruptions.    PSYCHIATRIC: Oriented to person, place, and time. The patient was able to demonstrate good judgement and reason, without hallucinations, abnormal affect or abnormal behaviors during the examination.     Left Eye:  Light pink diffuse injection to conjunctiva  Right Eye: No erythema or discharge.     Inspection of the eye under magnification and including inversion of the upper and lower eye lids was not possible due to video constraints to exam modality therefore unable to rule out the presence of lesions, abrasions, ulcers, infiltrate, dendritic markings, or other abnormal findings.        ASSESSMENT/PLAN:  Merly was seen today for video visit, eye problem and video visit.    Diagnoses and all orders for this visit:    Acute allergic conjunctivitis of both eyes  -     ketotifen (ZADITOR) 0.025 % ophthalmic solution; Place 1 drop into both eyes in the morning and 1 drop in the evening.        MEDICAL DECISION MAKING:   History and exam findings are consistent with allergic conjunctivitis.  Patient was treated presumptively for allergic cause of conjunctivitis with topical & oral allergy medication).       An understanding of the diagnosis was indicated, and agreement with the plan of care. The patient has no questions, comments, or concerns. I verified there were no other concerns, questions, or comments that they wanted to address at today's visit.    FOLLOW UP:    Return for As needed .    An in-person visit is needed if any worsening symptoms:  Eye pain/pressure  Vision changes  Fever  Double  vision     CONSENT:  This visit is being performed virtually via Video visit using [employer].     Clinical Location: [facility]  Merly Talamantes location Home and is physically present in   the [facility] at the time of this visit.     JACINDA Rondon  10/15/2022  8:29 AM           the goal of therapy     Contact Provider if::   New onset SBP less than 90 mmHg    midazolam (VERSED) injection 1 mg    AND Linked Order Group     fentaNYL 10 mcg/ml in 0.9%  ml infusion      Refill:  0      Titrate Infusion?:   Yes      Initial Infusion Dose::   50 mcg/hr      Goal of Therapy is::   RASS of 0 to -1      Contact Provider if::   Patient is receiving the maximum dose and is not achieving the goal of therapy     fentaNYL (SUBLIMAZE) bolus from bag      Refill:  0    sodium chloride 0.9 % bolus 1,000 mL    atropine injection 0.5 mg    calcium gluconate 1,000 mg in sodium chloride 50 mL    AND Linked Order Group     insulin regular (HumuLIN R;NovoLIN R) injection 10 Units     dextrose bolus 10% 250 mL    glucose chewable tablet 16 g    OR Linked Order Group     dextrose bolus 10% 125 mL     dextrose bolus 10% 250 mL    glucagon injection 1 mg    dextrose 10 % infusion    sodium zirconium cyclosilicate (LOKELMA) oral suspension 10 g    DISCONTD: norepinephrine (LEVOPHED) 16 mg in sodium chloride 0.9 % 250 mL infusion (premix)     Titrate Infusion?:   Yes     Initial Infusion Dose::   5 mcg/min     Goal of Therapy is::   MAP greater than 65 mmHg     Contact Provider if::   Patient is receiving the maximum dose and is not achieving the goal of therapy    DOPamine (INTROPIN) 1600 mcg/mL in dextrose 5% infusion     Titrate Infusion?:   Yes     Initial Infusion Dose::   5 mcg/kg/min     Goal of Therapy::   MAP greater than 65 mmHg     Contact Provider if::   Patient is receiving the maximum dose and is not achieving the goal of therapy    sodium bicarbonate 150 mEq in dextrose 5 % 1,000 mL infusion    glucagon injection 2 mg     CONSULTS:  IP CONSULT TO DIETITIAN  IP CONSULT TO CARDIOLOGY  IP CONSULT TO NEPHROLOGY  IP CONSULT TO CRITICAL CARE  IP CONSULT TO INTERNAL MEDICINE    FINAL IMPRESSION      1. Acute kidney injury    2. Hyperkalemia    3. Bradycardia          DISPOSITION/PLAN   DISPOSITION  Decision To Admit 03/28/2025 04:55:15 AM   DISPOSITION CONDITION Stable           PATIENT REFERRED TO:  No follow-up provider specified.  DISCHARGE MEDICATIONS:  New Prescriptions    No medications on file     Armando Orozco MD  Attending Emergency Physician  [employer] voice recognition software used in portions of this document.                    Armando Orozco MD  03/28/25 0610

## 2025-03-29 ENCOUNTER — APPOINTMENT (OUTPATIENT)
Dept: GENERAL RADIOLOGY | Age: 75
End: 2025-03-29
Payer: MEDICARE

## 2025-03-29 LAB
ALBUMIN SERPL-MCNC: 3 G/DL (ref 3.5–5.2)
ALBUMIN/GLOB SERPL: 1.2 {RATIO} (ref 1–2.5)
ALP SERPL-CCNC: 103 U/L (ref 35–104)
ALT SERPL-CCNC: 52 U/L (ref 10–35)
ANION GAP SERPL CALCULATED.3IONS-SCNC: 12 MMOL/L (ref 9–16)
AST SERPL-CCNC: 45 U/L (ref 10–35)
BACTERIA URNS QL MICRO: ABNORMAL
BASOPHILS # BLD: 0.05 K/UL (ref 0–0.2)
BASOPHILS NFR BLD: 0 % (ref 0–2)
BILIRUB SERPL-MCNC: 0.5 MG/DL (ref 0–1.2)
BILIRUB UR QL STRIP: NEGATIVE
BUN SERPL-MCNC: 34 MG/DL (ref 8–23)
CALCIUM SERPL-MCNC: 9.1 MG/DL (ref 8.8–10.2)
CHLORIDE SERPL-SCNC: 106 MMOL/L (ref 98–107)
CLARITY UR: CLEAR
CO2 SERPL-SCNC: 21 MMOL/L (ref 20–31)
COLOR UR: YELLOW
CREAT SERPL-MCNC: 1.4 MG/DL (ref 0.5–0.9)
EOSINOPHIL # BLD: 0.3 K/UL (ref 0–0.44)
EOSINOPHILS RELATIVE PERCENT: 2 % (ref 1–4)
EPI CELLS #/AREA URNS HPF: ABNORMAL /HPF (ref 0–5)
ERYTHROCYTE [DISTWIDTH] IN BLOOD BY AUTOMATED COUNT: 13.6 % (ref 11.8–14.4)
FIO2: 40
GFR, ESTIMATED: 39 ML/MIN/1.73M2
GLUCOSE BLD-MCNC: 166 MG/DL (ref 65–105)
GLUCOSE BLD-MCNC: 215 MG/DL (ref 65–105)
GLUCOSE BLD-MCNC: 260 MG/DL (ref 65–105)
GLUCOSE SERPL-MCNC: 238 MG/DL (ref 82–115)
GLUCOSE UR STRIP-MCNC: NEGATIVE MG/DL
HCT VFR BLD AUTO: 36.4 % (ref 36.3–47.1)
HGB BLD-MCNC: 12.8 G/DL (ref 11.9–15.1)
HGB UR QL STRIP.AUTO: ABNORMAL
IMM GRANULOCYTES # BLD AUTO: 0.06 K/UL (ref 0–0.3)
IMM GRANULOCYTES NFR BLD: 0 %
INR PPP: 1.1
KETONES UR STRIP-MCNC: NEGATIVE MG/DL
LACTATE BLDV-SCNC: 0.9 MMOL/L (ref 0.5–1.9)
LEUKOCYTE ESTERASE UR QL STRIP: ABNORMAL
LYMPHOCYTES NFR BLD: 2.15 K/UL (ref 1.1–3.7)
LYMPHOCYTES RELATIVE PERCENT: 16 % (ref 24–43)
MCH RBC QN AUTO: 31.5 PG (ref 25.2–33.5)
MCHC RBC AUTO-ENTMCNC: 35.2 G/DL (ref 28.4–34.8)
MCV RBC AUTO: 89.7 FL (ref 82.6–102.9)
MODE: AC
MONOCYTES NFR BLD: 0.57 K/UL (ref 0.1–1.2)
MONOCYTES NFR BLD: 4 % (ref 3–12)
MRSA, DNA, NASAL: NEGATIVE
NEGATIVE BASE EXCESS, ART: 0.6 MMOL/L (ref 0–2)
NEUTROPHILS NFR BLD: 77 % (ref 36–65)
NEUTS SEG NFR BLD: 10.66 K/UL (ref 1.5–8.1)
NITRITE UR QL STRIP: NEGATIVE
NRBC BLD-RTO: 0 PER 100 WBC
O2 DELIVERY DEVICE: ABNORMAL
PH UR STRIP: 6 [PH] (ref 5–8)
PLATELET # BLD AUTO: 136 K/UL (ref 138–453)
PMV BLD AUTO: 10.2 FL (ref 8.1–13.5)
POC HCO3: 23.1 MMOL/L (ref 21–28)
POC O2 SATURATION: 98.9 % (ref 94–98)
POC PCO2: 34.1 MM HG (ref 35–48)
POC PH: 7.44 (ref 7.35–7.45)
POC PO2: 122.7 MM HG (ref 83–108)
POTASSIUM SERPL-SCNC: 4.2 MMOL/L (ref 3.7–5.3)
PROCALCITONIN SERPL-MCNC: 0.58 NG/ML (ref 0–0.09)
PROT SERPL-MCNC: 5.5 G/DL (ref 6.6–8.7)
PROT UR STRIP-MCNC: NEGATIVE MG/DL
PROTHROMBIN TIME: 14.3 SEC (ref 11.5–14.2)
RBC # BLD AUTO: 4.06 M/UL (ref 3.95–5.11)
RBC #/AREA URNS HPF: ABNORMAL /HPF (ref 0–2)
SODIUM SERPL-SCNC: 139 MMOL/L (ref 136–145)
SP GR UR STRIP: 1.01 (ref 1–1.03)
SPECIMEN DESCRIPTION: NORMAL
UROBILINOGEN UR STRIP-ACNC: NORMAL EU/DL (ref 0–1)
VANCOMYCIN SERPL-MCNC: 13.4 UG/ML (ref 5–40)
WBC #/AREA URNS HPF: ABNORMAL /HPF (ref 0–5)
WBC OTHER # BLD: 13.8 K/UL (ref 3.5–11.3)

## 2025-03-29 PROCEDURE — 80202 ASSAY OF VANCOMYCIN: CPT

## 2025-03-29 PROCEDURE — 99232 SBSQ HOSP IP/OBS MODERATE 35: CPT | Performed by: INTERNAL MEDICINE

## 2025-03-29 PROCEDURE — 81001 URINALYSIS AUTO W/SCOPE: CPT

## 2025-03-29 PROCEDURE — 71045 X-RAY EXAM CHEST 1 VIEW: CPT

## 2025-03-29 PROCEDURE — 82803 BLOOD GASES ANY COMBINATION: CPT

## 2025-03-29 PROCEDURE — 89220 SPUTUM SPECIMEN COLLECTION: CPT

## 2025-03-29 PROCEDURE — 2580000003 HC RX 258

## 2025-03-29 PROCEDURE — 83605 ASSAY OF LACTIC ACID: CPT

## 2025-03-29 PROCEDURE — 87205 SMEAR GRAM STAIN: CPT

## 2025-03-29 PROCEDURE — 6360000002 HC RX W HCPCS

## 2025-03-29 PROCEDURE — 80053 COMPREHEN METABOLIC PANEL: CPT

## 2025-03-29 PROCEDURE — 94003 VENT MGMT INPAT SUBQ DAY: CPT

## 2025-03-29 PROCEDURE — 37799 UNLISTED PX VASCULAR SURGERY: CPT

## 2025-03-29 PROCEDURE — 2700000000 HC OXYGEN THERAPY PER DAY

## 2025-03-29 PROCEDURE — 2580000003 HC RX 258: Performed by: INTERNAL MEDICINE

## 2025-03-29 PROCEDURE — 6370000000 HC RX 637 (ALT 250 FOR IP): Performed by: INTERNAL MEDICINE

## 2025-03-29 PROCEDURE — 84145 PROCALCITONIN (PCT): CPT

## 2025-03-29 PROCEDURE — 2500000003 HC RX 250 WO HCPCS: Performed by: INTERNAL MEDICINE

## 2025-03-29 PROCEDURE — 6360000002 HC RX W HCPCS: Performed by: EMERGENCY MEDICINE

## 2025-03-29 PROCEDURE — 2000000000 HC ICU R&B

## 2025-03-29 PROCEDURE — 2500000003 HC RX 250 WO HCPCS

## 2025-03-29 PROCEDURE — 6360000002 HC RX W HCPCS: Performed by: INTERNAL MEDICINE

## 2025-03-29 PROCEDURE — 87070 CULTURE OTHR SPECIMN AEROBIC: CPT

## 2025-03-29 PROCEDURE — 85025 COMPLETE CBC W/AUTO DIFF WBC: CPT

## 2025-03-29 PROCEDURE — 85610 PROTHROMBIN TIME: CPT

## 2025-03-29 PROCEDURE — 94761 N-INVAS EAR/PLS OXIMETRY MLT: CPT

## 2025-03-29 PROCEDURE — 82947 ASSAY GLUCOSE BLOOD QUANT: CPT

## 2025-03-29 PROCEDURE — 2580000003 HC RX 258: Performed by: EMERGENCY MEDICINE

## 2025-03-29 RX ORDER — FUROSEMIDE 10 MG/ML
40 INJECTION INTRAMUSCULAR; INTRAVENOUS ONCE
Status: COMPLETED | OUTPATIENT
Start: 2025-03-29 | End: 2025-03-29

## 2025-03-29 RX ADMIN — HEPARIN SODIUM 5000 UNITS: 5000 INJECTION INTRAVENOUS; SUBCUTANEOUS at 14:00

## 2025-03-29 RX ADMIN — Medication 75 MCG/HR: at 08:27

## 2025-03-29 RX ADMIN — SODIUM CHLORIDE: 0.9 INJECTION, SOLUTION INTRAVENOUS at 22:34

## 2025-03-29 RX ADMIN — INSULIN LISPRO 4 UNITS: 100 INJECTION, SOLUTION INTRAVENOUS; SUBCUTANEOUS at 18:32

## 2025-03-29 RX ADMIN — INSULIN LISPRO 2 UNITS: 100 INJECTION, SOLUTION INTRAVENOUS; SUBCUTANEOUS at 07:42

## 2025-03-29 RX ADMIN — MIDAZOLAM HYDROCHLORIDE 5 MG/HR: 5 INJECTION, SOLUTION INTRAMUSCULAR; INTRAVENOUS at 08:28

## 2025-03-29 RX ADMIN — HEPARIN SODIUM 5000 UNITS: 5000 INJECTION INTRAVENOUS; SUBCUTANEOUS at 06:15

## 2025-03-29 RX ADMIN — CEFEPIME 1000 MG: 1 INJECTION, POWDER, FOR SOLUTION INTRAMUSCULAR; INTRAVENOUS at 09:22

## 2025-03-29 RX ADMIN — SODIUM CHLORIDE: 0.9 INJECTION, SOLUTION INTRAVENOUS at 09:09

## 2025-03-29 RX ADMIN — SODIUM CHLORIDE, PRESERVATIVE FREE 10 ML: 5 INJECTION INTRAVENOUS at 07:43

## 2025-03-29 RX ADMIN — SODIUM CHLORIDE 40 MG: 9 INJECTION, SOLUTION INTRAMUSCULAR; INTRAVENOUS; SUBCUTANEOUS at 07:43

## 2025-03-29 RX ADMIN — SODIUM CHLORIDE, PRESERVATIVE FREE 10 ML: 5 INJECTION INTRAVENOUS at 20:51

## 2025-03-29 RX ADMIN — FUROSEMIDE 40 MG: 10 INJECTION, SOLUTION INTRAMUSCULAR; INTRAVENOUS at 11:42

## 2025-03-29 RX ADMIN — MIDAZOLAM HYDROCHLORIDE 1 MG: 1 INJECTION, SOLUTION INTRAMUSCULAR; INTRAVENOUS at 03:22

## 2025-03-29 RX ADMIN — CEFEPIME 2000 MG: 2 INJECTION, POWDER, FOR SOLUTION INTRAVENOUS at 20:48

## 2025-03-29 RX ADMIN — DOPAMINE HYDROCHLORIDE IN DEXTROSE 5 MCG/KG/MIN: 1.6 INJECTION, SOLUTION INTRAVENOUS at 11:01

## 2025-03-29 RX ADMIN — HEPARIN SODIUM 5000 UNITS: 5000 INJECTION INTRAVENOUS; SUBCUTANEOUS at 21:51

## 2025-03-29 RX ADMIN — INSULIN LISPRO 2 UNITS: 100 INJECTION, SOLUTION INTRAVENOUS; SUBCUTANEOUS at 11:43

## 2025-03-29 ASSESSMENT — PULMONARY FUNCTION TESTS
PIF_VALUE: 27
PIF_VALUE: 30
PIF_VALUE: 32
PIF_VALUE: 32
PIF_VALUE: 31
PIF_VALUE: 32
PIF_VALUE: 31
PIF_VALUE: 29
PIF_VALUE: 32
PIF_VALUE: 32
PIF_VALUE: 29
PIF_VALUE: 28
PIF_VALUE: 31
PIF_VALUE: 32
PIF_VALUE: 30
PIF_VALUE: 27
PIF_VALUE: 31
PIF_VALUE: 28
PIF_VALUE: 31
PIF_VALUE: 33
PIF_VALUE: 32
PIF_VALUE: 31
PIF_VALUE: 28
PIF_VALUE: 27
PIF_VALUE: 28
PIF_VALUE: 32

## 2025-03-29 NOTE — PLAN OF CARE
Problem: Safety - Medical Restraint  Goal: Remains free of injury from restraints (Restraint for Interference with Medical Device)  Description: INTERVENTIONS:  1. Determine that other, less restrictive measures have been tried or would not be effective before applying the restraint  2. Evaluate the patient's condition at the time of restraint application  3. Inform patient/family regarding the reason for restraint  4. Q2H: Monitor safety, psychosocial status, comfort, nutrition and hydration  3/29/2025 0644 by Saul Noriega RN  Outcome: Progressing  Flowsheets  Taken 3/29/2025 0600  Remains free of injury from restraints (restraint for interference with medical device):   Determine that other, less restrictive measures have been tried or would not be effective before applying the restraint   Evaluate the patient's condition at the time of restraint application   Inform patient/family regarding the reason for restraint   Every 2 hours: Monitor safety, psychosocial status, comfort, nutrition and hydration  Taken 3/29/2025 0400  Remains free of injury from restraints (restraint for interference with medical device):   Determine that other, less restrictive measures have been tried or would not be effective before applying the restraint   Evaluate the patient's condition at the time of restraint application   Inform patient/family regarding the reason for restraint   Every 2 hours: Monitor safety, psychosocial status, comfort, nutrition and hydration  Taken 3/29/2025 0200  Remains free of injury from restraints (restraint for interference with medical device):   Determine that other, less restrictive measures have been tried or would not be effective before applying the restraint   Evaluate the patient's condition at the time of restraint application   Inform patient/family regarding the reason for restraint   Every 2 hours: Monitor safety, psychosocial status, comfort, nutrition and hydration  Taken 3/29/2025  Deficit:  Goal: Optimize nutritional status  Outcome: Progressing     Problem: Gastrointestinal - Adult  Goal: Maintains adequate nutritional intake  3/29/2025 0644 by Saul Noriega, RN  Outcome: Progressing  3/28/2025 1813 by Poli Mello, RN  Outcome: Not Progressing

## 2025-03-29 NOTE — PROGRESS NOTES
Fentanyl drip decreased by half (50mcg/hr) and patient did not tolerate. Patient attempting to pull tube out and sit up in bed. Patient did not follow any commands. Fentanyl increased to 75 mcg/hr and 1mg bolus of versed given with rate of versed increased to 4mg/hr. Patient still restless versed increased to 5mg/hr.

## 2025-03-29 NOTE — PROGRESS NOTES
End Of Shift Note  [facility]  Summary of shift: Patient was admitted today from the ED with symptomatic bradycardia and respiratory failure requiring intubation. Patient arrived with a transvenous pacemaker in place with a leads pacing 1:1 at 70 BPM. During admission history completion with daughter it was noted that patients metoprolol dosage had been changed recently and they stated she has been dizzy the last several days. Cardiology notified of beta blocker home medication changes.     As the shift continued the patient required increased MA's to maintain 1:1 capture. Cardiology was made aware and chest x-ray was ordered to confirm placement of wires. Dr. Fitch was on the unit and responded to patient needs. Underlying rhythm was sinus bradycardia and pacer wires removed.     Dr. Bills also at bedside and updated on patient status, labs and gas values. Bicarb discontinued and rate and fio2 decreased. Nephrology was consulted and notified of changes in labs no new orders received.     Family at bedside throughout the shift and notified of plan of care. All questions answered and family agreeable of plan.       Vitals:    Vitals:    03/28/25 1812 03/28/25 1900 03/28/25 1922 03/28/25 2000   BP:    (!) 105/42   Pulse: 61 55     Resp: 16 14     Temp:    97.7 °F (36.5 °C)   TempSrc:    Oral   SpO2:  100%     Weight:   87.1 kg (192 lb)    Height: 1.575 m (5' 2\")  1.575 m (5' 2\")         I&O:   Intake/Output Summary (Last 24 hours) at 3/28/2025 2037  Last data filed at 3/28/2025 1645  Gross per 24 hour   Intake 3172.37 ml   Output 600 ml   Net 2572.37 ml       Resp Status: clear throughout.     Ventilator Settings:  Vent Mode: AC/PRVC Resp Rate (Set): 16 bpm/Vt (Set, mL): 550 mL/ /FiO2 : 40 %    Critical Care IV infusions:   midazolam 3 mg/hr (03/28/25 1645)    fentaNYL 75 mcg/hr (03/28/25 1956)    dextrose      DOPamine Stopped (03/28/25 0651)    sodium chloride      sodium chloride      sodium chloride 75 mL/hr

## 2025-03-29 NOTE — PROGRESS NOTES
Renal Progress Note    Patient :  Aracelis Jimenez; 74 y.o. MRN# [id number]  Location:  2027/2027-01  Attending:  Shanell Denney MD  Admit Date:  3/28/2025   Hospital Day: 1      Subjective:     Patient seen and examined at bedside.  She is intubated and sedated.  Family present.  She is not following commands.  When attempted to decrease the fentanyl patient attempted to sit up and was pulling at tubes.  No plans to extubate at present.  Continues on Vanco and cefepime per critical care service.  Chest x-ray unremarkable cultures negative so far.  Was given a dose of Lasix today patient had an excellent response.  IV fluids continue at 75 mL an hour.  Hemodynamically stable.  No fever or chills.  Yesterday she had 800 mL urine output, overnight she had another 300 mL.  She does have Lozano catheter in place.  Normal saline infusing at 75 mL/h  Currently on dopamine, blood pressures this morning are in the 120s systolic and heart rate in the 70s.  Urinalysis shows 20-50 RBCs and 2-5 WBCs.  No plans to start tube feeds at present  Today's BMP sodium 139 potassium 4.2, chloride 106 and bicarb 21, creatinine 1.4, UPC 0.34  Receiving Vanco and cefepime for pneumonia  Echo yesterday showed ejection fraction of 55-60%  Renal ultrasound shows right kidney 9.7 cm, left was not visualized    History reviewed:  Known history of morbid obesity, hypertension, chronic kidney disease stage IIIb baseline 1.4-1.6 secondary to diabetic nephrosclerosis has seen Karine Macias in the office for CKD care.  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  On the day of admission she developed severe abdominal pain and EMS was called, patient was found to be bradycardic with a heart rate in the 30s and hypotensive.  Was placed transcutaneously and transferred to [facility] ER, was given atropine.  In the hospital cardiology was consulted found to have junctional bradycardia, transvenous pacer was placed by cardiology  tablet, TAKE ONE TABLET BY MOUTH DAILY    Current Medications:     Scheduled Meds:    sodium chloride flush  5-40 mL IntraVENous 2 times per day    heparin (porcine)  5,000 Units SubCUTAneous 3 times per day    cefepime  1,000 mg IntraVENous Q12H    vancomycin (VANCOCIN) intermittent dosing (placeholder)   Other RX Placeholder    insulin glargine  10 Units SubCUTAneous Daily    insulin lispro  0-8 Units SubCUTAneous Q6H    sodium chloride flush  5-40 mL IntraVENous 2 times per day    lidocaine 1 % injection  50 mg IntraDERmal Once    pantoprazole (PROTONIX) 40 mg in sodium chloride (PF) 0.9 % 10 mL injection  40 mg IntraVENous Daily     Continuous Infusions:    midazolam 5 mg/hr (25 0828)    fentaNYL 75 mcg/hr (25 0827)    dextrose      DOPamine 5 mcg/kg/min (25 0610)    sodium chloride      sodium chloride      sodium chloride 75 mL/hr at 25 0909     PRN Meds:  midazolam, fentaNYL **AND** fentaNYL, glucose, dextrose bolus **OR** dextrose bolus, glucagon (rDNA), dextrose, sodium chloride flush, sodium chloride, acetaminophen **OR** acetaminophen, sodium chloride flush, sodium chloride    Input/Output:       I/O last 3 completed shifts:  In: 4292.5 [I.V.:2923.3; IV Piggyback:1369.2]  Out: 1250 [Urine:900; Emesis/NG output:350].    Patient Vitals for the past 96 hrs (Last 3 readings):   Weight   25 0404 88.4 kg (194 lb 12.8 oz)   25 2312 87.1 kg (192 lb)   25 1922 87.1 kg (192 lb)       Vital Signs:   Temperature:  Temp: 98 °F (36.7 °C)  TMax:   Temp (24hrs), Av.2 °F (36.8 °C), Min:97.4 °F (36.3 °C), Max:99.5 °F (37.5 °C)    Respirations:  Respirations: 16  Pulse:   Pulse: 75  BP:    BP: (!) 108/47  BP Range: Systolic (24hrs), Av , Min:105 , Max:133       Diastolic (24hrs), Av, Min:42, Max:53      Physical Examination:     General:  Intubated and sedated with FiO2 of 30 and PEEP of 8   HEENT: Atraumatic, normocephalic, no throat congestion, moist mucosa.  Eyes:

## 2025-03-29 NOTE — PROGRESS NOTES
PULMONARY PROGRESS NOTE:    REASON FOR VISIT: resp failure  Interval History:    Sedated, on vent    Events since last visit: none    PAST MEDICAL HISTORY:      Scheduled Meds:   cefepime  2,000 mg IntraVENous Q12H    sodium chloride flush  5-40 mL IntraVENous 2 times per day    heparin (porcine)  5,000 Units SubCUTAneous 3 times per day    vancomycin (VANCOCIN) intermittent dosing (placeholder)   Other RX Placeholder    [Held by provider] insulin glargine  10 Units SubCUTAneous Daily    insulin lispro  0-8 Units SubCUTAneous Q6H    sodium chloride flush  5-40 mL IntraVENous 2 times per day    lidocaine 1 % injection  50 mg IntraDERmal Once    pantoprazole (PROTONIX) 40 mg in sodium chloride (PF) 0.9 % 10 mL injection  40 mg IntraVENous Daily     Continuous Infusions:   midazolam 5 mg/hr (03/29/25 0828)    fentaNYL 75 mcg/hr (03/29/25 0827)    dextrose      DOPamine 5 mcg/kg/min (03/29/25 1101)    sodium chloride      sodium chloride      sodium chloride 75 mL/hr at 03/29/25 0909     PRN Meds:midazolam, fentaNYL **AND** fentaNYL, glucose, dextrose bolus **OR** dextrose bolus, glucagon (rDNA), dextrose, sodium chloride flush, sodium chloride, acetaminophen **OR** acetaminophen, sodium chloride flush, sodium chloride        PHYSICAL EXAMINATION:  BP (!) 111/51   Pulse 84   Temp 97.9 °F (36.6 °C) (Temporal)   Resp 16   Ht 1.575 m (5' 2\")   Wt 88.4 kg (194 lb 12.8 oz)   SpO2 97%   BMI 35.63 kg/m²     General : sedated, orally intubated, dopamine gtt  Neck - supple, no lymphadenopathy, JVD not raised  Heart - regular rhythm, S1 and S2 normal; no additional sounds heard  Lungs - Air Entry- fair bilaterally; breath sounds : vesicular;  rales/crackles - absent  Abdomen - soft, no tenderness  Upper Extremities  - no cyanosis, mottling; edema : absent  Lower Extremities: no cyanosis, mottling; edema : absent    Current Laboratory, Radiologic, Microbiologic, and Diagnostic studies reviewed      ASSESSMENT /

## 2025-03-29 NOTE — PROGRESS NOTES
End Of Shift Note  [facility]  Summary of shift: Pt had mostly uneventful shift. Continues on Fentanyl and Versed for sedation. Dopamine restarted due to hypotension. Tolerated fine. Pt responsive to pain and voice, however does not follow commands. Moves feet and hands. Did not breathe over the vent. OG hooked up to LIWS. 350 out this shift of yellow/green gastric contents. Lozano draining. 300 out this shift, output increased as BP stabilized. 0 mL on bladder scan to check for retention. Plan today is to try CPAP trials and potentially extubate    Vitals:    Vitals:    03/28/25 2312 03/29/25 0000 03/29/25 0327 03/29/25 0404   BP:  (!) 124/44  (!) 108/47   Pulse: 68 69  70   Resp: 16 16  16   Temp:  99.1 °F (37.3 °C)  99.5 °F (37.5 °C)   TempSrc:  Axillary  Axillary   SpO2: 100%  100% 98%   Weight: 87.1 kg (192 lb)   88.4 kg (194 lb 12.8 oz)   Height: 1.575 m (5' 2\")   1.575 m (5' 2\")        I&O:   Intake/Output Summary (Last 24 hours) at 3/29/2025 0727  Last data filed at 3/29/2025 0639  Gross per 24 hour   Intake 2947.78 ml   Output 1150 ml   Net 1797.78 ml       Resp Status: Ventilator. C/D    Ventilator Settings:  Vent Mode: AC/PRVC Resp Rate (Set): 16 bpm/Vt (Set, mL): 550 mL/ /FiO2 : 40 %    Critical Care IV infusions:   midazolam 3 mg/hr (03/29/25 0632)    fentaNYL 100 mcg/hr (03/29/25 0610)    dextrose      DOPamine 5 mcg/kg/min (03/29/25 0610)    sodium chloride      sodium chloride      sodium chloride 75 mL/hr at 03/29/25 0610        LDA:   Peripheral IV 03/28/25 Distal;Right Forearm (Active)   Number of days: 1       Peripheral IV 03/28/25 Left;Posterior Hand (Active)   Number of days: 1       Peripheral IV 03/28/25 Proximal;Right;Anterior Forearm (Active)   Number of days: 1       NG/OG/NJ/NE Tube Orogastric 16 fr Center mouth (Active)   Number of days: 1       Urinary Catheter 03/28/25 (Active)   Number of days: 1       ETT  (Active)   Number of days: 1       Arterial Line 03/28/25 Left Radial  (Active)   Number of days: 1       Introducer 03/28/25 Subclavian Left (Active)   Number of days: 0       Pacing Wires (Active)   Number of days: 0       Midline 03/28/25 Right Brachial (Active)   Number of days: 0

## 2025-03-29 NOTE — PROGRESS NOTES
End Of Shift Note  [facility]  Summary of shift: Patient remains on vent. Patient did not follow commands and at times attempts to pull out ET tube. B/P drops with increasing sedation. Patient remains on Dopamine. Heart rate stable 70-80, NSR. Patient given 1 dose of IV Lasix. Tube feed on hold till 3/30, will be reevaluated by DR Gore. Lalous on hold, b/s better. Family at bedside all day and updated on plan.    Vitals:    Vitals:    03/29/25 1730 03/29/25 1736 03/29/25 1738 03/29/25 1739   BP:   (!) 102/48    Pulse: 86 83 83 82   Resp: 13 11 16 16   Temp:       TempSrc:       SpO2: 100% 99% 99% 100%   Weight:       Height:            I&O:   Intake/Output Summary (Last 24 hours) at 3/29/2025 1741  Last data filed at 3/29/2025 1659  Gross per 24 hour   Intake 2313.19 ml   Output 1925 ml   Net 388.19 ml       Resp Status: Vent 30%, clear/dim    Ventilator Settings:  Vent Mode: AC/PRVC Resp Rate (Set): 16 bpm/Vt (Set, mL): 550 mL/ /FiO2 : 30 %    Critical Care IV infusions:   midazolam 6 mg/hr (03/29/25 1655)    fentaNYL 75 mcg/hr (03/29/25 0827)    dextrose      DOPamine 6 mcg/kg/min (03/29/25 1739)    sodium chloride      sodium chloride      sodium chloride 75 mL/hr at 03/29/25 0909        LDA:   Peripheral IV 03/28/25 Distal;Right Forearm (Active)   Number of days: 1       Peripheral IV 03/28/25 Left;Posterior Hand (Active)   Number of days: 1       Peripheral IV 03/28/25 Proximal;Right;Anterior Forearm (Active)   Number of days: 1       NG/OG/NJ/NE Tube Orogastric 16 fr Center mouth (Active)   Number of days: 1       Urinary Catheter 03/28/25 (Active)   Number of days: 1       ETT  (Active)   Number of days: 1       Arterial Line 03/28/25 Left Radial (Active)   Number of days: 1       Midline 03/28/25 Right Brachial (Active)   Number of days: 1

## 2025-03-29 NOTE — PLAN OF CARE
Problem: Respiratory - Adult  Goal: Achieves optimal ventilation and oxygenation  3/29/2025 1942 by Krystal Rico RCP  Outcome: Progressing     Problem: Respiratory - Adult  Goal: Able to breathe comfortably  Outcome: Progressing     Problem: Respiratory - Adult  Goal: Adequate oxygenation  Description: Adequate oxygenation  Outcome: Progressing     Problem: Respiratory - Adult  Goal: Will be able to breathe spontaneously, without ventilator support  Description: Will be able to breathe spontaneously, without ventilator support  Outcome: Progressing     Problem: Respiratory - Adult  Goal: Adequate oxygenation  Description: Adequate oxygenation  Outcome: Progressing         PROVIDE ADEQUATE OXYGENATION WITH ACCEPTABLE SP02/ABG'S    [x]  IDENTIFY APPROPRIATE OXYGEN THERAPY  [x]   MONITOR SP02/ABG'S AS NEEDED   [x]   PATIENT EDUCATION AS NEEDED        MECHANICAL VENTILATION     [x]  PROVIDE OPTIMAL VENTILATION  [x]   ASSESS FOR EXTUBATION READINESS  [x]   ASSESS FOR WEANING READINESS  [x]  EXTUBATE AS TOLERATED  [x]  IMPLEMENT ADULT MECHANICAL VENTILATION PROTOCOL  [x]  MAINTAIN ADEQUATE OXYGENATION  [x]  PERFORM SPONTANEOUS WEANING TRIAL AS TOLERATED

## 2025-03-29 NOTE — PROGRESS NOTES
Nutrition Note    Nutrition consult received for tube feeding order and management in mechanical vent patient on 3/29. Tube feeding was not started on the same day due to abdominal distention and NG tube set to LIWS. It was anticipated that patient maybe extubated today or tomorrow so tube feed start will be delayed one more day for possible extubation. Refer to comprehensive nutrition assessment (3/29) for additional detail of nutrition status.      Miladys SHERN, RDN, LDN  Lead Clinical Dietitian  RD Office Phone [phone]

## 2025-03-29 NOTE — PROGRESS NOTES
[facility]  Office: [phone]  Ulises Yoo DO, Glenn Patterson DO, Fernando Wilson DO, Reji Gagnon DO, Radha Grayson MD, Shanell Denney MD, Juanis Zaragoza MD, Carmela Jennings MD,  Kali Harp MD, Mitra Willson MD, Alex Adair MD,  Pamela Hodge DO, Ar Pizano MD, Jaylen Sepulveda MD, Raoul Yoo DO, Katina Deras MD,  Lawrence Davila DO, Belinda Whalen MD, Crystal Rai MD, Lance Spence MD,  Wilson Granger MD, Dom Rogers MD, Bhavana Wyatt MD, Kylah Richmond MD, Humphrey Ureña MD, Guille Garcia MD, Pedro Hernandez DO, Yasir Parker MD, Pamela Patterson MD, Mohsin Reza, MD, Shirley Waterhouse, CNP,  Sridevi Muñoz, CNP, Pedro Kuhn, CNP,  Maria G Norman, DNP, Edelmira Oviedo, CNP, Niki Hernandez, CNP, Penelope Reyes, CNP, Shiela Oropeza, CNP, RAVIN Tovar-C, Екатерина Thomas, CNP, Nena Baltazar, CNP,  Iliana Rivero, CNP, Simona Foley, CNP, Jimena Castillo, CNP,  Veronica Hogue, CNS, Miladys Matamoros, CNP, Kirsty Blanco, CNP,   Rosaura Wolff, CNP         [facility]   IN-PATIENT SERVICE   [facility]    Progress Note    3/29/2025    8:10 AM    Name:   Aracelis Jimenez  MRN:     [id number]     Acct:      [id number]   Room:   2027/2027-01  IP Day:  1  Admit Date:  3/28/2025  2:47 AM    PCP:   Francisca Daniels MD  Code Status:  Full Code    Subjective:     C/C:   Chief Complaint   Patient presents with    Altered Mental Status     Interval History Status: improved.     Pt's BP dropped with IV sedation, on low dose IV dopamine.  HR 70s.  She gets agitated when sedation is held.  No Cpap trials started yet.  Daughter is in the room    Brief History:     Aracelis Jimenez is a 74 y.o. Non- / non  female who presents with Altered Mental Status   and is admitted to the hospital for the management of Respiratory failure requiring intubation.     Pt is a 74 yr old female with a PMH HTN, DM2, hypothyroid, CKD 3, Depression, and stroke who presented to  the ED with bradycardia and altered mental status.  Per her son and daughter, she had been feeling tired and lightheaded over the past 2 weeks.  Her PCP had changed her Lopressor 50mg BID to Toprol XL 100mg daily.  Her baseline Cr 1.5-1.8.  She is not very active at baseline per son.  A daughter was there overnight and her mother went to the bathroom and got weak and fell to the ground.  She was bradycardiac and EMS tried Atropine and then placed a transcutaneous pacer.  Then she was intubated in the ER because she wasn't protecting her airway.  Cardiology came in and placed a transvenous pacer.       She is intubated and sedated.  She was opening her eyes earlier and moved all extremities.  Family denies any hx smoking, COPD or asthma.         Medications:     Allergies:    Allergies   Allergen Reactions    Morphine Anaphylaxis    Metformin Diarrhea    Sulfa Antibiotics     Adhesive Tape Dermatitis and Rash       Current Meds:   Scheduled Meds:    sodium chloride flush  5-40 mL IntraVENous 2 times per day    heparin (porcine)  5,000 Units SubCUTAneous 3 times per day    cefepime  1,000 mg IntraVENous Q12H    vancomycin (VANCOCIN) intermittent dosing (placeholder)   Other RX Placeholder    insulin glargine  10 Units SubCUTAneous Daily    insulin lispro  0-8 Units SubCUTAneous Q6H    sodium chloride flush  5-40 mL IntraVENous 2 times per day    lidocaine 1 % injection  50 mg IntraDERmal Once    pantoprazole (PROTONIX) 40 mg in sodium chloride (PF) 0.9 % 10 mL injection  40 mg IntraVENous Daily     Continuous Infusions:    midazolam 3 mg/hr (03/29/25 0632)    fentaNYL 50 mcg/hr (03/29/25 0737)    dextrose      DOPamine 5 mcg/kg/min (03/29/25 0610)    sodium chloride      sodium chloride      sodium chloride 75 mL/hr at 03/29/25 0610     PRN Meds: midazolam, fentaNYL **AND** fentaNYL, glucose, dextrose bolus **OR** dextrose bolus, glucagon (rDNA), dextrose, sodium chloride flush, sodium chloride, acetaminophen **OR**

## 2025-03-29 NOTE — PLAN OF CARE
Problem: Gastrointestinal - Adult  Goal: Maintains adequate nutritional intake  3/29/2025 1919 by Haritha Garner  Outcome: Not Progressing  Note: Pt NPO at this time. Will address diet in the morning.  3/29/2025 1043 by Lamar Sutton, RN  Outcome: Progressing  Flowsheets (Taken 3/29/2025 0800)  Maintains adequate nutritional intake:   Monitor percentage of each meal consumed   Identify factors contributing to decreased intake, treat as appropriate   Assist with meals as needed  3/29/2025 0644 by Saul Noriega, RN  Outcome: Progressing     Problem: Gastrointestinal - Adult  Goal: Maintains adequate nutritional intake  3/29/2025 1919 by Haritha Garner  Outcome: Not Progressing  Note: Pt NPO at this time. Will address diet in the morning.  3/29/2025 1043 by Lamar Sutton, RN  Outcome: Progressing  Flowsheets (Taken 3/29/2025 0800)  Maintains adequate nutritional intake:   Monitor percentage of each meal consumed   Identify factors contributing to decreased intake, treat as appropriate   Assist with meals as needed  3/29/2025 0644 by Saul Noriega, RN  Outcome: Progressing

## 2025-03-29 NOTE — PLAN OF CARE
Problem: Safety - Medical Restraint  Goal: Remains free of injury from restraints (Restraint for Interference with Medical Device)  Description: INTERVENTIONS:  1. Determine that other, less restrictive measures have been tried or would not be effective before applying the restraint  2. Evaluate the patient's condition at the time of restraint application  3. Inform patient/family regarding the reason for restraint  4. Q2H: Monitor safety, psychosocial status, comfort, nutrition and hydration  3/29/2025 1043 by Lamar Sutton, RN  Outcome: Progressing  Flowsheets (Taken 3/29/2025 0800)  Remains free of injury from restraints (restraint for interference with medical device):   Determine that other, less restrictive measures have been tried or would not be effective before applying the restraint   Evaluate the patient's condition at the time of restraint application   Inform patient/family regarding the reason for restraint   Every 2 hours: Monitor safety, psychosocial status, comfort, nutrition and hydration  3/29/2025 0644 by Saul Noriega RN  Outcome: Progressing  Flowsheets  Taken 3/29/2025 0600  Remains free of injury from restraints (restraint for interference with medical device):   Determine that other, less restrictive measures have been tried or would not be effective before applying the restraint   Evaluate the patient's condition at the time of restraint application   Inform patient/family regarding the reason for restraint   Every 2 hours: Monitor safety, psychosocial status, comfort, nutrition and hydration  Taken 3/29/2025 0400  Remains free of injury from restraints (restraint for interference with medical device):   Determine that other, less restrictive measures have been tried or would not be effective before applying the restraint   Evaluate the patient's condition at the time of restraint application   Inform patient/family regarding the reason for restraint   Every 2 hours: Monitor safety,  0800)  Maintains adequate nutritional intake:   Monitor percentage of each meal consumed   Identify factors contributing to decreased intake, treat as appropriate   Assist with meals as needed  3/29/2025 0644 by Saul Noriega RN  Outcome: Progressing     Problem: Genitourinary - Adult  Goal: Urinary catheter remains patent  3/29/2025 1043 by Lamar Sutton RN  Outcome: Progressing  Flowsheets (Taken 3/29/2025 0800)  Urinary catheter remains patent:   Assess patency of urinary catheter   Irrigate catheter per Licensed Independent Practitioner order if indicated and notify Licensed Independent Practitioner if unable to irrigate  3/29/2025 0644 by Saul Noriega RN  Outcome: Progressing  Flowsheets (Taken 3/28/2025 2000)  Urinary catheter remains patent:   Assess patency of urinary catheter   Irrigate catheter per Licensed Independent Practitioner order if indicated and notify Licensed Independent Practitioner if unable to irrigate     Problem: Metabolic/Fluid and Electrolytes - Adult  Goal: Electrolytes maintained within normal limits  3/29/2025 1043 by Lamar Sutton RN  Outcome: Progressing  Flowsheets (Taken 3/29/2025 0800)  Electrolytes maintained within normal limits:   Monitor labs and assess patient for signs and symptoms of electrolyte imbalances   Administer electrolyte replacement as ordered   Monitor response to electrolyte replacements, including repeat lab results as appropriate  3/29/2025 0644 by Saul Noriega RN  Outcome: Progressing  Flowsheets (Taken 3/28/2025 2000)  Electrolytes maintained within normal limits:   Monitor labs and assess patient for signs and symptoms of electrolyte imbalances   Administer electrolyte replacement as ordered   Monitor response to electrolyte replacements, including repeat lab results as appropriate  Goal: Hemodynamic stability and optimal renal function maintained  3/29/2025 1043 by Lamar Sutton RN  Outcome: Progressing  Flowsheets (Taken 3/29/2025  0800)  Hemodynamic stability and optimal renal function maintained:   Monitor labs and assess for signs and symptoms of volume excess or deficit   Monitor intake, output and patient weight   Monitor urine specific gravity, serum osmolarity and serum sodium as indicated or ordered  3/29/2025 0644 by Saul Noriega RN  Outcome: Progressing  Flowsheets (Taken 3/28/2025 2000)  Hemodynamic stability and optimal renal function maintained:   Monitor labs and assess for signs and symptoms of volume excess or deficit   Monitor intake, output and patient weight   Monitor urine specific gravity, serum osmolarity and serum sodium as indicated or ordered   Monitor response to interventions for patient's volume status, including labs, urine output, blood pressure (other measures as available)  Goal: Glucose maintained within prescribed range  3/29/2025 1043 by Lamar Sutton RN  Outcome: Progressing  Flowsheets (Taken 3/29/2025 0800)  Glucose maintained within prescribed range:   Monitor blood glucose as ordered   Assess for signs and symptoms of hyperglycemia and hypoglycemia   Administer ordered medications to maintain glucose within target range  3/29/2025 0644 by Saul Noriega RN  Outcome: Progressing  Flowsheets (Taken 3/28/2025 2000)  Glucose maintained within prescribed range:   Monitor blood glucose as ordered   Assess for signs and symptoms of hyperglycemia and hypoglycemia   Administer ordered medications to maintain glucose within target range     Problem: Pain  Goal: Verbalizes/displays adequate comfort level or baseline comfort level  3/29/2025 1043 by Lamar Sutton RN  Outcome: Progressing  3/29/2025 0644 by Saul Noriega RN  Outcome: Progressing     Problem: Nutrition Deficit:  Goal: Optimize nutritional status  3/29/2025 1043 by Lamar Sutton RN  Outcome: Progressing  3/29/2025 0644 by Saul Noriega RN  Outcome: Progressing

## 2025-03-30 ENCOUNTER — APPOINTMENT (OUTPATIENT)
Dept: GENERAL RADIOLOGY | Age: 75
End: 2025-03-30
Payer: MEDICARE

## 2025-03-30 LAB
ANION GAP SERPL CALCULATED.3IONS-SCNC: 12 MMOL/L (ref 9–16)
BASOPHILS # BLD: 0.04 K/UL (ref 0–0.2)
BASOPHILS NFR BLD: 0 % (ref 0–2)
BUN SERPL-MCNC: 27 MG/DL (ref 8–23)
CALCIUM SERPL-MCNC: 8.5 MG/DL (ref 8.8–10.2)
CHLORIDE SERPL-SCNC: 103 MMOL/L (ref 98–107)
CO2 SERPL-SCNC: 20 MMOL/L (ref 20–31)
CREAT SERPL-MCNC: 1.4 MG/DL (ref 0.5–0.9)
EOSINOPHIL # BLD: 0.23 K/UL (ref 0–0.44)
EOSINOPHILS RELATIVE PERCENT: 2 % (ref 1–4)
ERYTHROCYTE [DISTWIDTH] IN BLOOD BY AUTOMATED COUNT: 13.2 % (ref 11.8–14.4)
FIO2: 30
GFR, ESTIMATED: 39 ML/MIN/1.73M2
GLUCOSE BLD-MCNC: 206 MG/DL (ref 65–105)
GLUCOSE BLD-MCNC: 223 MG/DL (ref 65–105)
GLUCOSE BLD-MCNC: 238 MG/DL (ref 65–105)
GLUCOSE BLD-MCNC: 256 MG/DL (ref 65–105)
GLUCOSE BLD-MCNC: 257 MG/DL (ref 65–105)
GLUCOSE BLD-MCNC: 290 MG/DL (ref 65–105)
GLUCOSE SERPL-MCNC: 250 MG/DL (ref 82–115)
HCT VFR BLD AUTO: 32.6 % (ref 36.3–47.1)
HGB BLD-MCNC: 11.5 G/DL (ref 11.9–15.1)
IMM GRANULOCYTES # BLD AUTO: 0.03 K/UL (ref 0–0.3)
IMM GRANULOCYTES NFR BLD: 0 %
LYMPHOCYTES NFR BLD: 2.64 K/UL (ref 1.1–3.7)
LYMPHOCYTES RELATIVE PERCENT: 27 % (ref 24–43)
MCH RBC QN AUTO: 31 PG (ref 25.2–33.5)
MCHC RBC AUTO-ENTMCNC: 35.3 G/DL (ref 28.4–34.8)
MCV RBC AUTO: 87.9 FL (ref 82.6–102.9)
MODE: ABNORMAL
MONOCYTES NFR BLD: 0.46 K/UL (ref 0.1–1.2)
MONOCYTES NFR BLD: 5 % (ref 3–12)
NEUTROPHILS NFR BLD: 66 % (ref 36–65)
NEUTS SEG NFR BLD: 6.26 K/UL (ref 1.5–8.1)
NRBC BLD-RTO: 0 PER 100 WBC
O2 DELIVERY DEVICE: ABNORMAL
PLATELET # BLD AUTO: 113 K/UL (ref 138–453)
PMV BLD AUTO: 9.7 FL (ref 8.1–13.5)
POC HCO3: 23 MMOL/L (ref 21–28)
POC O2 SATURATION: 98.8 % (ref 94–98)
POC PCO2: 31 MM HG (ref 35–48)
POC PH: 7.48 (ref 7.35–7.45)
POC PO2: 114.6 MM HG (ref 83–108)
POSITIVE BASE EXCESS, ART: 0.1 MMOL/L (ref 0–3)
POTASSIUM SERPL-SCNC: 3.7 MMOL/L (ref 3.7–5.3)
RBC # BLD AUTO: 3.71 M/UL (ref 3.95–5.11)
SAMPLE SITE: ABNORMAL
SODIUM SERPL-SCNC: 135 MMOL/L (ref 136–145)
WBC OTHER # BLD: 9.7 K/UL (ref 3.5–11.3)

## 2025-03-30 PROCEDURE — 82803 BLOOD GASES ANY COMBINATION: CPT

## 2025-03-30 PROCEDURE — 6360000002 HC RX W HCPCS

## 2025-03-30 PROCEDURE — 2580000003 HC RX 258

## 2025-03-30 PROCEDURE — 85025 COMPLETE CBC W/AUTO DIFF WBC: CPT

## 2025-03-30 PROCEDURE — 6360000002 HC RX W HCPCS: Performed by: EMERGENCY MEDICINE

## 2025-03-30 PROCEDURE — 94761 N-INVAS EAR/PLS OXIMETRY MLT: CPT

## 2025-03-30 PROCEDURE — 99232 SBSQ HOSP IP/OBS MODERATE 35: CPT | Performed by: INTERNAL MEDICINE

## 2025-03-30 PROCEDURE — 2500000003 HC RX 250 WO HCPCS: Performed by: INTERNAL MEDICINE

## 2025-03-30 PROCEDURE — 71045 X-RAY EXAM CHEST 1 VIEW: CPT

## 2025-03-30 PROCEDURE — 2580000003 HC RX 258: Performed by: INTERNAL MEDICINE

## 2025-03-30 PROCEDURE — 2000000000 HC ICU R&B

## 2025-03-30 PROCEDURE — 2700000000 HC OXYGEN THERAPY PER DAY

## 2025-03-30 PROCEDURE — 94003 VENT MGMT INPAT SUBQ DAY: CPT

## 2025-03-30 PROCEDURE — 80048 BASIC METABOLIC PNL TOTAL CA: CPT

## 2025-03-30 PROCEDURE — 37799 UNLISTED PX VASCULAR SURGERY: CPT

## 2025-03-30 PROCEDURE — 6360000002 HC RX W HCPCS: Performed by: INTERNAL MEDICINE

## 2025-03-30 PROCEDURE — 2580000003 HC RX 258: Performed by: EMERGENCY MEDICINE

## 2025-03-30 PROCEDURE — 6370000000 HC RX 637 (ALT 250 FOR IP): Performed by: INTERNAL MEDICINE

## 2025-03-30 PROCEDURE — 2500000003 HC RX 250 WO HCPCS

## 2025-03-30 RX ORDER — INSULIN GLARGINE 100 [IU]/ML
10 INJECTION, SOLUTION SUBCUTANEOUS NIGHTLY
Status: DISCONTINUED | OUTPATIENT
Start: 2025-03-31 | End: 2025-03-31

## 2025-03-30 RX ORDER — FUROSEMIDE 10 MG/ML
40 INJECTION INTRAMUSCULAR; INTRAVENOUS ONCE
Status: COMPLETED | OUTPATIENT
Start: 2025-03-30 | End: 2025-03-30

## 2025-03-30 RX ADMIN — HEPARIN SODIUM 5000 UNITS: 5000 INJECTION INTRAVENOUS; SUBCUTANEOUS at 05:44

## 2025-03-30 RX ADMIN — DOPAMINE HYDROCHLORIDE IN DEXTROSE 5 MCG/KG/MIN: 1.6 INJECTION, SOLUTION INTRAVENOUS at 00:08

## 2025-03-30 RX ADMIN — DEXMEDETOMIDINE 0.3 MCG/KG/HR: 100 INJECTION, SOLUTION INTRAVENOUS at 17:06

## 2025-03-30 RX ADMIN — Medication 75 MCG/HR: at 00:33

## 2025-03-30 RX ADMIN — INSULIN LISPRO 2 UNITS: 100 INJECTION, SOLUTION INTRAVENOUS; SUBCUTANEOUS at 06:30

## 2025-03-30 RX ADMIN — SODIUM CHLORIDE, PRESERVATIVE FREE 10 ML: 5 INJECTION INTRAVENOUS at 07:24

## 2025-03-30 RX ADMIN — SODIUM CHLORIDE: 0.9 INJECTION, SOLUTION INTRAVENOUS at 14:05

## 2025-03-30 RX ADMIN — SODIUM CHLORIDE 40 MG: 9 INJECTION, SOLUTION INTRAMUSCULAR; INTRAVENOUS; SUBCUTANEOUS at 08:48

## 2025-03-30 RX ADMIN — INSULIN LISPRO 4 UNITS: 100 INJECTION, SOLUTION INTRAVENOUS; SUBCUTANEOUS at 18:05

## 2025-03-30 RX ADMIN — FUROSEMIDE 40 MG: 10 INJECTION, SOLUTION INTRAMUSCULAR; INTRAVENOUS at 09:44

## 2025-03-30 RX ADMIN — SODIUM CHLORIDE, PRESERVATIVE FREE 10 ML: 5 INJECTION INTRAVENOUS at 21:14

## 2025-03-30 RX ADMIN — SODIUM CHLORIDE, PRESERVATIVE FREE 10 ML: 5 INJECTION INTRAVENOUS at 07:25

## 2025-03-30 RX ADMIN — HEPARIN SODIUM 5000 UNITS: 5000 INJECTION INTRAVENOUS; SUBCUTANEOUS at 14:01

## 2025-03-30 RX ADMIN — DEXMEDETOMIDINE 0.2 MCG/KG/HR: 100 INJECTION, SOLUTION INTRAVENOUS at 09:15

## 2025-03-30 RX ADMIN — CEFEPIME 2000 MG: 2 INJECTION, POWDER, FOR SOLUTION INTRAVENOUS at 08:50

## 2025-03-30 RX ADMIN — HEPARIN SODIUM 5000 UNITS: 5000 INJECTION INTRAVENOUS; SUBCUTANEOUS at 21:37

## 2025-03-30 RX ADMIN — Medication 50 MCG: at 02:56

## 2025-03-30 RX ADMIN — CEFEPIME 2000 MG: 2 INJECTION, POWDER, FOR SOLUTION INTRAVENOUS at 21:13

## 2025-03-30 RX ADMIN — INSULIN LISPRO 4 UNITS: 100 INJECTION, SOLUTION INTRAVENOUS; SUBCUTANEOUS at 12:23

## 2025-03-30 RX ADMIN — INSULIN LISPRO 4 UNITS: 100 INJECTION, SOLUTION INTRAVENOUS; SUBCUTANEOUS at 00:03

## 2025-03-30 RX ADMIN — MIDAZOLAM HYDROCHLORIDE 4 MG/HR: 5 INJECTION, SOLUTION INTRAMUSCULAR; INTRAVENOUS at 08:41

## 2025-03-30 ASSESSMENT — PULMONARY FUNCTION TESTS
PIF_VALUE: 28
PIF_VALUE: 26
PIF_VALUE: 26
PIF_VALUE: 33
PIF_VALUE: 26
PIF_VALUE: 28
PIF_VALUE: 27
PIF_VALUE: 27
PIF_VALUE: 19
PIF_VALUE: 26
PIF_VALUE: 26
PIF_VALUE: 27
PIF_VALUE: 28
PIF_VALUE: 26
PIF_VALUE: 28
PIF_VALUE: 26
PIF_VALUE: 25
PIF_VALUE: 29
PIF_VALUE: 24
PIF_VALUE: 29
PIF_VALUE: 20
PIF_VALUE: 26
PIF_VALUE: 27
PIF_VALUE: 28
PIF_VALUE: 27
PIF_VALUE: 26
PIF_VALUE: 26
PIF_VALUE: 28
PIF_VALUE: 26
PIF_VALUE: 28
PIF_VALUE: 26
PIF_VALUE: 26
PIF_VALUE: 25
PIF_VALUE: 26
PIF_VALUE: 27
PIF_VALUE: 32
PIF_VALUE: 27
PIF_VALUE: 26
PIF_VALUE: 29
PIF_VALUE: 27
PIF_VALUE: 25
PIF_VALUE: 27
PIF_VALUE: 27
PIF_VALUE: 28
PIF_VALUE: 26
PIF_VALUE: 28
PIF_VALUE: 26
PIF_VALUE: 29
PIF_VALUE: 26
PIF_VALUE: 27
PIF_VALUE: 27
PIF_VALUE: 26
PIF_VALUE: 26
PIF_VALUE: 28
PIF_VALUE: 26
PIF_VALUE: 27
PIF_VALUE: 28
PIF_VALUE: 26
PIF_VALUE: 28
PIF_VALUE: 26
PIF_VALUE: 28
PIF_VALUE: 26
PIF_VALUE: 26
PIF_VALUE: 25
PIF_VALUE: 25
PIF_VALUE: 33
PIF_VALUE: 27
PIF_VALUE: 28
PIF_VALUE: 28
PIF_VALUE: 27
PIF_VALUE: 27
PIF_VALUE: 28
PIF_VALUE: 29
PIF_VALUE: 28
PIF_VALUE: 25
PIF_VALUE: 27
PIF_VALUE: 27
PIF_VALUE: 28
PIF_VALUE: 26
PIF_VALUE: 26

## 2025-03-30 NOTE — PLAN OF CARE
Problem: Respiratory - Adult  Goal: Achieves optimal ventilation and oxygenation  3/30/2025 1916 by Krystal Rico RCP  Outcome: Progressing     Problem: Respiratory - Adult  Goal: Able to breathe comfortably  3/30/2025 1916 by Krystal Rico RCP  Outcome: Progressing     Problem: Respiratory - Adult  Goal: Adequate oxygenation  Description: Adequate oxygenation  3/30/2025 1916 by Krystal Rico RCP  Outcome: Progressing     Problem: Respiratory - Adult  Goal: Will be able to breathe spontaneously, without ventilator support  Description: Will be able to breathe spontaneously, without ventilator support  3/30/2025 1916 by Krystal Rico RCP  Outcome: Progressing     Problem: Respiratory - Adult  Goal: Adequate oxygenation  Description: Adequate oxygenation  3/30/2025 1916 by Krystal Rico RCP  Outcome: Progressing         MECHANICAL VENTILATION     [x]  PROVIDE OPTIMAL VENTILATION  [x]   ASSESS FOR EXTUBATION READINESS  [x]   ASSESS FOR WEANING READINESS  [x]  EXTUBATE AS TOLERATED  [x]  IMPLEMENT ADULT MECHANICAL VENTILATION PROTOCOL  [x]  MAINTAIN ADEQUATE OXYGENATION  [x]  PERFORM SPONTANEOUS WEANING TRIAL AS TOLERATED        PROVIDE ADEQUATE OXYGENATION WITH ACCEPTABLE SP02/ABG'S    [x]  IDENTIFY APPROPRIATE OXYGEN THERAPY  [x]   MONITOR SP02/ABG'S AS NEEDED   [x]   PATIENT EDUCATION AS NEEDED

## 2025-03-30 NOTE — PROGRESS NOTES
Message sent to Dr Gore. Patient finally aroused after Neuro recheck for pain responses. Patient moving all four extremities. Patient is not following any commands and attempting to pull out ET tube and sitting straight up in bed. Fentanyl/Versed restarted and paused precedex. B/p also increased to 220 systolic. Dopamine paused. B/p decreased without any intervention and dopamine was resumed at 2 mcg. Dr Parker aware of fluctuating b/p. Meanwhile Dr Gore responded back to avoid fentanyl and versed and resume precedex drip. Family at bedside and updated on plan.

## 2025-03-30 NOTE — PROGRESS NOTES
End Of Shift Note  [facility]  Summary of shift: Sedation changed from fentanyl and versed to precedex. Tube feeding started at 1511. Lasix 40mg given x1. Patient does not follow any commands but moves all extremities and attempts to pull out ET tube. B/P has been very labile, still on dopamine. Unable to CPAP due to no breathing over vent. Family at bedside all day and updated by all Mds. Possible neuro consult 3/31 if still not following commands.    Vitals:    Vitals:    03/30/25 1700 03/30/25 1702 03/30/25 1715 03/30/25 1729   BP:       Pulse: 60 64 64 65   Resp: 13 15 12 13   Temp:       TempSrc:       SpO2: 99% 99% 98% 99%   Weight:       Height:            I&O:   Intake/Output Summary (Last 24 hours) at 3/30/2025 1732  Last data filed at 3/30/2025 1716  Gross per 24 hour   Intake 2623.05 ml   Output 2925 ml   Net -301.95 ml       Resp Status: vent 30%, clear/dim    Ventilator Settings:  Vent Mode: AC/PRVC Resp Rate (Set): 12 bpm/Vt (Set, mL): 550 mL/ /FiO2 : 30 %    Critical Care IV infusions:   dexmedeTOMIDine (PRECEDEX) 400 mcg in sodium chloride 0.9 % 100 mL infusion 0.3 mcg/kg/hr (03/30/25 1706)    midazolam Stopped (03/30/25 1651)    fentaNYL Stopped (03/30/25 1651)    dextrose      DOPamine 2 mcg/kg/min (03/30/25 1702)    sodium chloride      sodium chloride      sodium chloride 75 mL/hr at 03/30/25 1702        LDA:   Peripheral IV 03/28/25 Distal;Right Forearm (Active)   Number of days: 2       Peripheral IV 03/28/25 Left;Posterior Hand (Active)   Number of days: 2       Peripheral IV 03/28/25 Proximal;Right;Anterior Forearm (Active)   Number of days: 2       NG/OG/NJ/NE Tube Orogastric 16 fr Center mouth (Active)   Number of days: 2       Urinary Catheter 03/28/25 (Active)   Number of days: 2       ETT  (Active)   Number of days: 2       Arterial Line 03/28/25 Left Radial (Active)   Number of days: 2       Midline 03/28/25 Right Brachial (Active)   Number of days: 2

## 2025-03-30 NOTE — PROGRESS NOTES
[facility]  Office: [phone]  Ulises Yoo DO, Glenn Patterson DO, Fernando Wilson DO, Reji Gagnon DO, Radha Grayson MD, Shanell Denney MD, Juanis Zaragoza MD, Carmela Jennings MD,  Kali Harp MD, Mitra Willson MD, Alex Adair MD,  Pamela Hodge DO, Ar Pizano MD, Jaylen Sepulveda MD, Raoul Yoo DO, Katina Deras MD,  Lawrence Davila DO, Belinda Whalen MD, Crystal Rai MD, Lance Spence MD,  Wilson Granger MD, Dom Rogers MD, Bhavana Wyatt MD, Kylah Richmond MD, Humphrey Ureña MD, Guille Garcia MD, Pedro Hernandez DO, Yasir Parker MD, Pamela Patterson MD, Mohsin Reza, MD, Shirley Waterhouse, CNP,  Sridevi Muñoz, CNP, Pedro Kuhn, CNP,  Maria G Norman, DNP, Edelmira Oviedo, CNP, Niki Hernandez, CNP, Penelope Reyes, CNP, Shiela Oropeza, CNP, RAVIN Tovar-C, Екатерина Thomas, CNP, Nena Baltazar, CNP,  Iliana Rivero, CNP, Simona Foley, CNP, Jimena Castillo, CNP,  Veronica Hogue, CNS, Miladys Matamoros, CNP, Kirsty Blanco, CNP,   Rosaura Wolff, CNP         [facility]   IN-PATIENT SERVICE   [facility]    Progress Note    3/30/2025    8:07 AM    Name:   Aracelis Jimenez  MRN:     [id number]     Acct:      [id number]   Room:   2027/2027-01  IP Day:  2  Admit Date:  3/28/2025  2:47 AM    PCP:   Francisca Daniels MD  Code Status:  Full Code    Subjective:     C/C:   Chief Complaint   Patient presents with    Altered Mental Status     Interval History Status: improved.     Pt's BP dropped with IV sedation, on low dose IV dopamine.  HR 50s.  She gets agitated on Versed and fentanyl.  She was switched to Precedex and now hasn't moved or opened her eyes since 10am.  No Cpap trials started yet since she's not breathing over the vent.  Her family is in the room.    Brief History:     Aracelis Jimenez is a 74 y.o. Non- / non  female who presents with Altered Mental Status   and is admitted to the hospital for the management of Respiratory failure  Contrast? None  Result Date: 3/28/2025  1. Dependent opacities in the lung bases compatible with subsegmental atelectasis versus consolidation. 2. Enteric tube within the stomach. The stomach is distended with gas and ingested material. 3. Mild stool burden throughout the colon.     CT Head W/O Contrast  Result Date: 3/28/2025  No acute intracranial abnormality.       Physical Examination:        General appearance:  intubated and sedated, not responding to painful stimuli  Mental Status:  sedated, not following commands off sedation  HEENT: NC/AT, PERRLA- sluggish, Mouth- ETT, dry  Lungs:  clear to auscultation bilaterally, normal effort  Heart:  regular rate and rhythm, no murmur  Abdomen:  soft, nontender, nondistended, normal bowel sounds, no masses, hepatomegaly, splenomegaly  Extremities:  no edema, redness, tenderness in the calves  Skin:  no gross lesions, rashes, induration    Assessment:        Hospital Problems           Last Modified POA    * (Principal) Respiratory failure requiring intubation ([facility]) 3/28/2025 Yes    Acute kidney injury 3/28/2025 Yes    Primary hypertension 3/28/2025 Yes    Type 2 diabetes mellitus with hyperglycemia, with long-term current use of insulin ([facility]) 3/28/2025 Yes    Hypothyroidism 3/28/2025 Yes    CKD stage 3 due to type 2 diabetes mellitus ([facility]) 3/28/2025 Yes    Symptomatic bradycardia 3/28/2025 Yes       Plan:        Symptomatic bradycardia- persistent despite Atropine,s/p transvenous pacer discontinued per Cardiology, hold BB for now, wean off Dopamine   Acute resp failure s/p intubation- wean vent per Pulmonary medicine, possible extubation if she passes Cpap trials, on IV antibiotics for suspected aspiration, monitor CXR, wean Precedex if able.    Decreased mentation- may be due to all the sedation, will consult Neurology tomorrow if she's not waking up or responding more  Metabolic acidosis- improved   Hyperkalemia- resolved  Acute/ CKD 3- Cr improving with IVF,  appreciate Nephrology input, avoid nephrotoxins  HTN- monitor BP, hold BB, low BP requiring small dose of IV Dopamine  DM2- with hyperglycemia, improving,   Lantus 10 units qhs, SSI  Hypothyroid- TSH elevated, may be abnormal due to current illness, recommend repeat, may need to adjust Synthroid  Gi proph-   IV PPI  DVT proph  NPO for now  - recommend starting tube feeds    Dw nurse and daughters and sons    Shanell Denney MD  3/30/2025  8:07 AM

## 2025-03-30 NOTE — PROGRESS NOTES
Message sent to Dr Gore regarding patient unable to go on CPAP trial due to not breathing over vent. Patient is only moving slightly to noxious stimuli. Requesting to start tube feed. Awaiting response.    Dr Gore messaged back. Ok to start tube feed per dietician's recommendations.

## 2025-03-30 NOTE — PROGRESS NOTES
Renal Progress Note    Patient :  Aracelis Jimenez; 74 y.o. MRN# [id number]  Location:  2027/2027-01  Attending:  Shanell Denney MD  Admit Date:  3/28/2025   Hospital Day: 2      Subjective:     Patient seen and examined at bedside.  She is intubated and sedated.  Family present.  She is not following commands.  Per nursing planning on starting Precedex later today.  The fentanyl and Versed has been decreased.  She is receiving normal saline at 75 mL/h.  With a dose of Lasix yesterday she did have improved urine output, 2.2 L urine output over the past 24 hours.  She was receiving empiric Vanco and cefepime for presumed pneumonia the vancomycin has been discontinued, now only receiving cefepime  Blood pressures are running in the 110s/120s systolic    History reviewed:  Known history of morbid obesity, hypertension, chronic kidney disease stage IIIb baseline 1.4-1.6 secondary to diabetic nephrosclerosis has seen Karine Macias in the office for CKD care.  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  On the day of admission she developed severe abdominal pain and EMS was called, patient was found to be bradycardic with a heart rate in the 30s and hypotensive.  Was placed transcutaneously and transferred to [facility], was given atropine.  In the hospital cardiology was consulted found to have junctional bradycardia, transvenous pacer was placed by cardiology and dopamine drip was continued and EP was consulted. CT abdomen pelvis without IV contrast 3/28/2025 showed dependent opacities in the lung bases compatible with subsegmental atelectasis versus consolidation. Enteric tube within the stomach. Stomach was distended with gas and ingested material.  Nephrology was consulted for increased creatinine which peaked at 2.0, she was treated for hyperkalemia with a potassium of 6.1.  Home medicines included lisinopril HCTZ.  Recently her beta-blocker dose was increased by her primary care  31.1 31.5 31.0   MCHC 32.6 35.2* 35.3*   RDW 13.7 13.6 13.2    136* 113*   MPV 10.0 10.2 9.7      BMP:   Recent Labs     03/28/25  1354 03/29/25  0545 03/30/25  0450   * 139 135*   K 3.2* 4.2 3.7   CL 98 106 103   CO2 28 21 20   BUN 36* 34* 27*   CREATININE 1.5* 1.4* 1.4*   GLUCOSE 496* 238* 250*   CALCIUM 7.6* 9.1 8.5*      Phosphorus:   No results for input(s): \"PHOS\" in the last 72 hours.  Magnesium:    Recent Labs     03/28/25  0306 03/28/25  1354   MG 2.2 1.7     Albumin:  No results for input(s): \"LABALBU\" in the last 72 hours.  BNP:    No results found for: \"BNP\"  OLIVIA:      Lab Results   Component Value Date/Time    OLIVIA NEGATIVE 11/04/2024 11:13 AM     SPEP:  Lab Results   Component Value Date/Time    ALBCAL 3.2 11/04/2024 11:13 AM    ALBPCT 52 11/04/2024 11:13 AM    A1PCT 5 11/04/2024 11:13 AM    A2PCT 17 11/04/2024 11:13 AM    BETAPCT 11 11/04/2024 11:13 AM    GAMGLOB 0.9 11/04/2024 11:13 AM    GGPCT 15 11/04/2024 11:13 AM    PATH ELECTRONICALLY SIGNED. KAROLINA PIMENTEL MD 11/04/2024 11:13 AM     UPEP:   No results found for: \"LABPE\"  C3:     Lab Results   Component Value Date/Time    C3 146 11/04/2024 11:13 AM     C4:     Lab Results   Component Value Date/Time    C4 23 11/04/2024 11:13 AM     MPO ANCA:     Lab Results   Component Value Date/Time    MPO <0.3 11/04/2024 11:13 AM     PR3 ANCA:     Lab Results   Component Value Date/Time    PR3 <0.7 11/04/2024 11:13 AM     Anti-GBM:   No results found for: \"GBMABIGG\"  Hep BsAg:       No results found for: \"HEPBSAG\"  Hep C AB:        No results found for: \"HEPCAB\"    Urinalysis/Chemistries:      Lab Results   Component Value Date/Time    NITRU NEGATIVE 03/29/2025 02:40 AM    COLORU Yellow 03/29/2025 02:40 AM    PHUR 6.0 03/29/2025 02:40 AM    PHUR 5.5 05/28/2022 06:04 PM    WBCUA 2 TO 5 03/29/2025 02:40 AM    WBCUA 0-4 11/19/2024 05:23 PM    RBCUA 20 TO 50 03/29/2025 02:40 AM    RBCUA 0-2 11/19/2024 05:23 PM    MUCUS OCC 11/19/2024 05:23 PM

## 2025-03-30 NOTE — PROGRESS NOTES
End Of Shift Note  [facility]  Summary of shift: Pt continues to be on vent. Does not follow commands but is responsive to voice and pain. Remains on Dopamine, blood pressures very sensitive. Heart rate stable 70-80's NSR. Lozano output improved, just under a 1L this shift. Blood sugars still Q6. Diet to be reevaluated today. Plan today is to determine goal of care     Vitals:    Vitals:    03/30/25 0446 03/30/25 0451 03/30/25 0500 03/30/25 0600   BP:       Pulse: 77 78 79 72   Resp: (!) 0 (!) 0 15    Temp:       TempSrc:       SpO2: 100% 100% 100% 100%   Weight:       Height:            I&O:   Intake/Output Summary (Last 24 hours) at 3/30/2025 0634  Last data filed at 3/30/2025 0617  Gross per 24 hour   Intake 2756.44 ml   Output 3175 ml   Net -418.56 ml       Resp Status: Vent 30%. Some rhonci noted    Ventilator Settings:  Vent Mode: AC/PRVC Resp Rate (Set): 12 bpm/Vt (Set, mL): 550 mL/ /FiO2 : 30 %    Critical Care IV infusions:   midazolam 6 mg/hr (03/30/25 0617)    fentaNYL 75 mcg/hr (03/30/25 0617)    dextrose      DOPamine 4.5 mcg/kg/min (03/30/25 0617)    sodium chloride      sodium chloride      sodium chloride 75 mL/hr at 03/30/25 0617        LDA:   Peripheral IV 03/28/25 Distal;Right Forearm (Active)   Number of days: 2       Peripheral IV 03/28/25 Left;Posterior Hand (Active)   Number of days: 2       Peripheral IV 03/28/25 Proximal;Right;Anterior Forearm (Active)   Number of days: 2       NG/OG/NJ/NE Tube Orogastric 16 fr Center mouth (Active)   Number of days: 2       Urinary Catheter 03/28/25 (Active)   Number of days: 2       ETT  (Active)   Number of days: 2       Arterial Line 03/28/25 Left Radial (Active)   Number of days: 2       Midline 03/28/25 Right Brachial (Active)   Number of days: 1

## 2025-03-30 NOTE — PLAN OF CARE
Problem: Respiratory - Adult  Goal: Achieves optimal ventilation and oxygenation  3/30/2025 1635 by Jennifer Dozier RCP  Outcome: Progressing     Problem: Respiratory - Adult  Goal: Able to breathe comfortably  Outcome: Progressing     Problem: Respiratory - Adult  Goal: Adequate oxygenation  Description: Adequate oxygenation  Outcome: Progressing     Problem: Respiratory - Adult  Goal: Will be able to breathe spontaneously, without ventilator support  Description: Will be able to breathe spontaneously, without ventilator support  Outcome: Progressing     Problem: Respiratory - Adult  Goal: Adequate oxygenation  Description: Adequate oxygenation  Outcome: Progressing

## 2025-03-30 NOTE — PROGRESS NOTES
PULMONARY PROGRESS NOTE:    REASON FOR VISIT: resp failure  Interval History:    Sedated, on vent    Events since last visit: none    PAST MEDICAL HISTORY:      Scheduled Meds:   cefepime  2,000 mg IntraVENous Q12H    sodium chloride flush  5-40 mL IntraVENous 2 times per day    heparin (porcine)  5,000 Units SubCUTAneous 3 times per day    [Held by provider] insulin glargine  10 Units SubCUTAneous Daily    insulin lispro  0-8 Units SubCUTAneous Q6H    sodium chloride flush  5-40 mL IntraVENous 2 times per day    lidocaine 1 % injection  50 mg IntraDERmal Once    pantoprazole (PROTONIX) 40 mg in sodium chloride (PF) 0.9 % 10 mL injection  40 mg IntraVENous Daily     Continuous Infusions:   dexmedeTOMIDine (PRECEDEX) 400 mcg in sodium chloride 0.9 % 100 mL infusion 0.6 mcg/kg/hr (03/30/25 1003)    midazolam 1 mg/hr (03/30/25 0948)    fentaNYL 50 mcg/hr (03/30/25 0705)    dextrose      DOPamine 1 mcg/kg/min (03/30/25 0931)    sodium chloride      sodium chloride      sodium chloride 75 mL/hr at 03/30/25 0617     PRN Meds:midazolam, fentaNYL **AND** fentaNYL, glucose, dextrose bolus **OR** dextrose bolus, glucagon (rDNA), dextrose, sodium chloride flush, sodium chloride, acetaminophen **OR** acetaminophen, sodium chloride flush, sodium chloride        PHYSICAL EXAMINATION:  BP (!) 101/44   Pulse 67   Temp 97.1 °F (36.2 °C) (Temporal)   Resp 12   Ht 1.575 m (5' 2.01\")   Wt 86.7 kg (191 lb 3.2 oz)   SpO2 100%   BMI 34.96 kg/m²     General : sedated, orally intubated, dopamine gtt; fentanyl, versed (weaning),on precedex  Neck - supple, no lymphadenopathy, JVD not raised  Heart - regular rhythm, S1 and S2 normal; no additional sounds heard  Lungs - Air Entry- fair bilaterally; breath sounds : vesicular;  rales/crackles - absent  Abdomen - soft, no tenderness  Upper Extremities  - no cyanosis, mottling; edema : absent  Lower Extremities: no cyanosis, mottling; edema : absent    Current Laboratory, Radiologic,

## 2025-03-30 NOTE — PROGRESS NOTES
This writer rounded at bedside with DR Denney. Patient has had minimal responses since 11am. Facial movement with turns and no longer attempting to pull tube and sit up in bed. Sedation cont is being weaned and RT attempted to put patient on CPAP trial but patient was not breathing over vent. Pupils are equal and sluggish. Family is at bedside and updated on plan by Dr Denney.

## 2025-03-30 NOTE — PLAN OF CARE
Problem: Safety - Medical Restraint  Goal: Remains free of injury from restraints (Restraint for Interference with Medical Device)  Description: INTERVENTIONS:  1. Determine that other, less restrictive measures have been tried or would not be effective before applying the restraint  2. Evaluate the patient's condition at the time of restraint application  3. Inform patient/family regarding the reason for restraint  4. Q2H: Monitor safety, psychosocial status, comfort, nutrition and hydration  3/30/2025 1915 by Haritha Garner  Outcome: Progressing  Flowsheets (Taken 3/30/2025 1904 by Saul Noriega, RN)  Remains free of injury from restraints (restraint for interference with medical device):   Determine that other, less restrictive measures have been tried or would not be effective before applying the restraint   Evaluate the patient's condition at the time of restraint application   Inform patient/family regarding the reason for restraint   Every 2 hours: Monitor safety, psychosocial status, comfort, nutrition and hydration  3/30/2025 1115 by Linda Donohue RN  Outcome: Progressing  Flowsheets (Taken 3/30/2025 0800 by Lamar Sutton, RN)  Remains free of injury from restraints (restraint for interference with medical device):   Determine that other, less restrictive measures have been tried or would not be effective before applying the restraint   Evaluate the patient's condition at the time of restraint application   Inform patient/family regarding the reason for restraint   Every 2 hours: Monitor safety, psychosocial status, comfort, nutrition and hydration     Problem: Safety - Adult  Goal: Free from fall injury  3/30/2025 1915 by Haritha Garner  Outcome: Progressing  3/30/2025 1115 by Linda Donohue, RN  Outcome: Progressing     Problem: Discharge Planning  Goal: Discharge to home or other facility with appropriate resources  3/30/2025 1915 by Haritha Garner  Outcome:

## 2025-03-30 NOTE — PLAN OF CARE
Problem: Safety - Medical Restraint  Goal: Remains free of injury from restraints (Restraint for Interference with Medical Device)  Description: INTERVENTIONS:  1. Determine that other, less restrictive measures have been tried or would not be effective before applying the restraint  2. Evaluate the patient's condition at the time of restraint application  3. Inform patient/family regarding the reason for restraint  4. Q2H: Monitor safety, psychosocial status, comfort, nutrition and hydration  Outcome: Progressing  Flowsheets (Taken 3/30/2025 0800 by Lamar Sutton, RN)  Remains free of injury from restraints (restraint for interference with medical device):   Determine that other, less restrictive measures have been tried or would not be effective before applying the restraint   Evaluate the patient's condition at the time of restraint application   Inform patient/family regarding the reason for restraint   Every 2 hours: Monitor safety, psychosocial status, comfort, nutrition and hydration     Problem: Safety - Adult  Goal: Free from fall injury  Outcome: Progressing     Problem: Discharge Planning  Goal: Discharge to home or other facility with appropriate resources  Outcome: Progressing  Flowsheets (Taken 3/30/2025 0800 by Lamar Sutton, RN)  Discharge to home or other facility with appropriate resources:   Identify barriers to discharge with patient and caregiver   Arrange for needed discharge resources and transportation as appropriate   Identify discharge learning needs (meds, wound care, etc)     Problem: Skin/Tissue Integrity  Goal: Skin integrity remains intact  Description: 1.  Monitor for areas of redness and/or skin breakdown  2.  Assess vascular access sites hourly  3.  Every 4-6 hours minimum:  Change oxygen saturation probe site  4.  Every 4-6 hours:  If on nasal continuous positive airway pressure, respiratory therapy assess nares and determine need for appliance change or resting period  Outcome:

## 2025-03-30 NOTE — CONSULTS
[facility]  Inpatient Cardiology Consult             Date:   3/29/25  Patient name: Aracelis Jimenez  Date of admission:  3/28/2025  2:47 AM  MRN:   [id number]  YOB: 1950      Reason for consultation:  Bradycardia    CHIEF COMPLAINT:  Weakness, altered mental status    History Obtained From:  Family and medical record    HISTORY OF PRESENT ILLNESS:      The patient is a 74 y.o woman with h/o Type II DM, HTN, CKD and CVA who was found on 3/28 to be severely bradycardic at home after EMS was called for weakness and altered mental status.  She was found to have junctional bradycardia with HR 32 bpm with DKA  with  and serum K+ 6.1.  Her metoprolol had also recently been increased to 100 mg daily.  She was intubated and atropine, Ca gluconate, Lokelma and insulin were given and a temporary pacemaker was placed, however was discontinued that evening after lead dislodgement.  By that time her HR was 57 bpm in sinus rhythm.  She is currently on IV dopamine at 2.5 mcg/kg/min with HR 80 bpm. With serum K+ 4.2 mEq/L.      Past Medical History:   has a past medical history of Depression, Diabetes mellitus (HCC), Hypertension, Stroke (HCC), and Tremors of nervous system.    Past Surgical History:   has a past surgical history that includes Hysterectomy; Cholecystectomy; and Rib fracture surgery (2022).     Home Medications:    Prior to Admission medications    Medication Sig Start Date End Date Taking? Authorizing Provider   amLODIPine (NORVASC) 5 MG tablet Take 1 tablet by mouth daily 2/28/25  Yes ProviderRegi MD   HUMALOG KWIKPEN 100 UNIT/ML SOPN Inject 6 Units into the skin 3 times daily (with meals) 12/26/24  Yes ProviderRegi MD   lisinopril-hydroCHLOROthiazide (PRINZIDE;ZESTORETIC) 10-12.5 MG per tablet Take 1 tablet by mouth daily 1/22/25  Yes Regi Goodwin MD   metoprolol succinate (TOPROL XL) 100 MG extended release tablet Take 1 tablet by mouth daily 3/13/25   complaints.  Integumentary: No rash or pruritis.  Neurological: No headache, diplopia, change in muscle strength, numbness or tingling. No change in gait, balance, coordination, mood, affect, memory, mentation, behavior.  Psychiatric: No anxiety, or depression.  Endocrine: No temperature intolerance. No excessive thirst, fluid intake, or urination. No tremor.  Hematologic/Lymphatic: No abnormal bruising or bleeding, blood clots or swollen lymph nodes.  Allergic/Immunologic: No nasal congestion or hives.    PHYSICAL EXAM:    Physical Examination:    BP (!) 145/61   Pulse 86   Temp 98.9 °F (37.2 °C) (Temporal)   Resp (S) 17   Ht 1.575 m (5' 2.01\")   Wt 86.7 kg (191 lb 3.2 oz)   SpO2 100%   BMI 34.96 kg/m²    Constitutional and General Appearance: Intubated and sedated  HEENT: PERRL, no cervical lymphadenopathy. No masses palpable. Normal oral mucosa  Respiratory:  Normal excursion and expansion without use of accessory muscles  Resp Auscultation: Good respiratory effort. No for increased work of breathing. On auscultation: clear to auscultation bilaterally  Cardiovascular:  The apical impulse is not displaced  Heart tones are crisp and normal. regular S1 and S2. Murmurs:  None  Jugular venous pulsation Normal  The carotid upstroke is normal in amplitude and contour without delay or bruit  Peripheral pulses are symmetrical and full   Abdomen:  No masses or tenderness  Bowel sounds present  Extremities:   No Cyanosis or Clubbing   Lower extremity edema: None   Skin: Warm and dry  Neurological:  Intubated and sedated  Moves all extremities well  No abnormalities of mood, affect, memory, mentation, or behavior are noted    DATA:    Diagnostics:      EKG:  Junctional bradycardia, 38 bpm; cannot r/o inferior infarct; non-specific ST and T wave abnormality    2D ECHO ( 3/28/25)      Left Ventricle: Normal left ventricular systolic function with a visually estimated EF of 55 - 60%. EF by visual approximation is 55%.

## 2025-03-30 NOTE — PROGRESS NOTES
Nutrition Note    Writer is unsure if patient will be extubated today. If patient remains intubated consider starting tube feedings. Order for Vital AF 1.2 Mono goal rate 55 mL/hr (1320 mL total volume), water flush 30 mL every 4 hours have been placed. Only start tube feeding if approved by physician.       Current Tube Feeding (TF) Orders:   Feeding Route: Orogastric  Formula: Peptide Based  Schedule: Continuous  Additives/Modulars: None  Water Flushes: 30 mL every 4 hours (180 mL free water)  Current TF & Flush Orders Provides: 1584 kcal, 99 gm of protein, 1250 ml free water    Miladys SHERN, RDN, LDN  Lead Clinical Dietitian  RD Office Phone [phone]

## 2025-03-31 ENCOUNTER — APPOINTMENT (OUTPATIENT)
Dept: GENERAL RADIOLOGY | Age: 75
End: 2025-03-31
Payer: MEDICARE

## 2025-03-31 LAB
ANION GAP SERPL CALCULATED.3IONS-SCNC: 12 MMOL/L (ref 9–16)
BASOPHILS # BLD: <0.03 K/UL (ref 0–0.2)
BASOPHILS NFR BLD: 0 % (ref 0–2)
BUN SERPL-MCNC: 23 MG/DL (ref 8–23)
CALCIUM SERPL-MCNC: 8.6 MG/DL (ref 8.8–10.2)
CHLORIDE SERPL-SCNC: 107 MMOL/L (ref 98–107)
CO2 SERPL-SCNC: 22 MMOL/L (ref 20–31)
CREAT SERPL-MCNC: 1.2 MG/DL (ref 0.5–0.9)
EOSINOPHIL # BLD: 0.29 K/UL (ref 0–0.44)
EOSINOPHILS RELATIVE PERCENT: 6 % (ref 1–4)
ERYTHROCYTE [DISTWIDTH] IN BLOOD BY AUTOMATED COUNT: 13.2 % (ref 11.8–14.4)
FIO2: 30
GFR, ESTIMATED: 50 ML/MIN/1.73M2
GLUCOSE BLD-MCNC: 215 MG/DL (ref 65–105)
GLUCOSE BLD-MCNC: 222 MG/DL (ref 65–105)
GLUCOSE BLD-MCNC: 238 MG/DL (ref 65–105)
GLUCOSE BLD-MCNC: 268 MG/DL (ref 65–105)
GLUCOSE BLD-MCNC: 292 MG/DL (ref 65–105)
GLUCOSE SERPL-MCNC: 268 MG/DL (ref 82–115)
HCT VFR BLD AUTO: 34.2 % (ref 36.3–47.1)
HGB BLD-MCNC: 11.9 G/DL (ref 11.9–15.1)
IMM GRANULOCYTES # BLD AUTO: 0.02 K/UL (ref 0–0.3)
IMM GRANULOCYTES NFR BLD: 0 %
LYMPHOCYTES NFR BLD: 0.79 K/UL (ref 1.1–3.7)
LYMPHOCYTES RELATIVE PERCENT: 15 % (ref 24–43)
MCH RBC QN AUTO: 31.1 PG (ref 25.2–33.5)
MCHC RBC AUTO-ENTMCNC: 34.8 G/DL (ref 28.4–34.8)
MCV RBC AUTO: 89.3 FL (ref 82.6–102.9)
MICROORGANISM SPEC CULT: ABNORMAL
MICROORGANISM/AGENT SPEC: ABNORMAL
MODE: NORMAL
MONOCYTES NFR BLD: 0.28 K/UL (ref 0.1–1.2)
MONOCYTES NFR BLD: 6 % (ref 3–12)
NEGATIVE BASE EXCESS, ART: 1.9 MMOL/L (ref 0–2)
NEUTROPHILS NFR BLD: 73 % (ref 36–65)
NEUTS SEG NFR BLD: 3.73 K/UL (ref 1.5–8.1)
NRBC BLD-RTO: 0 PER 100 WBC
PLATELET # BLD AUTO: 89 K/UL (ref 138–453)
PMV BLD AUTO: 10.2 FL (ref 8.1–13.5)
POC HCO3: 22.9 MMOL/L (ref 21–28)
POC O2 SATURATION: 97.2 % (ref 94–98)
POC PCO2: 38.4 MM HG (ref 35–48)
POC PH: 7.38 (ref 7.35–7.45)
POC PO2: 93.7 MM HG (ref 83–108)
POTASSIUM SERPL-SCNC: 3.7 MMOL/L (ref 3.7–5.3)
PROCALCITONIN SERPL-MCNC: 0.32 NG/ML (ref 0–0.09)
RBC # BLD AUTO: 3.83 M/UL (ref 3.95–5.11)
SERVICE CMNT-IMP: ABNORMAL
SODIUM SERPL-SCNC: 141 MMOL/L (ref 136–145)
SPECIMEN DESCRIPTION: ABNORMAL
WBC OTHER # BLD: 5.1 K/UL (ref 3.5–11.3)

## 2025-03-31 PROCEDURE — 2500000003 HC RX 250 WO HCPCS: Performed by: EMERGENCY MEDICINE

## 2025-03-31 PROCEDURE — 6360000002 HC RX W HCPCS: Performed by: INTERNAL MEDICINE

## 2025-03-31 PROCEDURE — 82947 ASSAY GLUCOSE BLOOD QUANT: CPT

## 2025-03-31 PROCEDURE — 2580000003 HC RX 258: Performed by: INTERNAL MEDICINE

## 2025-03-31 PROCEDURE — 99233 SBSQ HOSP IP/OBS HIGH 50: CPT | Performed by: NURSE PRACTITIONER

## 2025-03-31 PROCEDURE — 85025 COMPLETE CBC W/AUTO DIFF WBC: CPT

## 2025-03-31 PROCEDURE — 6360000002 HC RX W HCPCS: Performed by: NURSE PRACTITIONER

## 2025-03-31 PROCEDURE — 94003 VENT MGMT INPAT SUBQ DAY: CPT

## 2025-03-31 PROCEDURE — 36600 WITHDRAWAL OF ARTERIAL BLOOD: CPT

## 2025-03-31 PROCEDURE — 2000000000 HC ICU R&B

## 2025-03-31 PROCEDURE — 2580000003 HC RX 258: Performed by: EMERGENCY MEDICINE

## 2025-03-31 PROCEDURE — 6360000002 HC RX W HCPCS

## 2025-03-31 PROCEDURE — 84145 PROCALCITONIN (PCT): CPT

## 2025-03-31 PROCEDURE — 94761 N-INVAS EAR/PLS OXIMETRY MLT: CPT

## 2025-03-31 PROCEDURE — 2580000003 HC RX 258

## 2025-03-31 PROCEDURE — 2700000000 HC OXYGEN THERAPY PER DAY

## 2025-03-31 PROCEDURE — 6370000000 HC RX 637 (ALT 250 FOR IP): Performed by: INTERNAL MEDICINE

## 2025-03-31 PROCEDURE — 6360000002 HC RX W HCPCS: Performed by: EMERGENCY MEDICINE

## 2025-03-31 PROCEDURE — 99232 SBSQ HOSP IP/OBS MODERATE 35: CPT | Performed by: INTERNAL MEDICINE

## 2025-03-31 PROCEDURE — 2500000003 HC RX 250 WO HCPCS: Performed by: INTERNAL MEDICINE

## 2025-03-31 PROCEDURE — 82803 BLOOD GASES ANY COMBINATION: CPT

## 2025-03-31 PROCEDURE — 36415 COLL VENOUS BLD VENIPUNCTURE: CPT

## 2025-03-31 PROCEDURE — 80048 BASIC METABOLIC PNL TOTAL CA: CPT

## 2025-03-31 PROCEDURE — 71045 X-RAY EXAM CHEST 1 VIEW: CPT

## 2025-03-31 RX ORDER — FENTANYL CITRATE-0.9 % NACL/PF 20 MCG/2ML
50 SYRINGE (ML) INTRAVENOUS EVERY 30 MIN PRN
Refills: 0 | Status: DISCONTINUED | OUTPATIENT
Start: 2025-03-31 | End: 2025-04-01

## 2025-03-31 RX ORDER — INSULIN GLARGINE 100 [IU]/ML
14 INJECTION, SOLUTION SUBCUTANEOUS NIGHTLY
Status: DISCONTINUED | OUTPATIENT
Start: 2025-03-31 | End: 2025-04-05 | Stop reason: HOSPADM

## 2025-03-31 RX ORDER — LORAZEPAM 2 MG/ML
0.5 INJECTION INTRAMUSCULAR EVERY 4 HOURS PRN
Status: DISCONTINUED | OUTPATIENT
Start: 2025-03-31 | End: 2025-04-02

## 2025-03-31 RX ORDER — MIDAZOLAM HYDROCHLORIDE 1 MG/ML
2 INJECTION, SOLUTION INTRAMUSCULAR; INTRAVENOUS
Status: DISCONTINUED | OUTPATIENT
Start: 2025-03-31 | End: 2025-04-02

## 2025-03-31 RX ORDER — METOCLOPRAMIDE HYDROCHLORIDE 5 MG/ML
10 INJECTION INTRAMUSCULAR; INTRAVENOUS ONCE
Status: COMPLETED | OUTPATIENT
Start: 2025-03-31 | End: 2025-03-31

## 2025-03-31 RX ORDER — MIDAZOLAM HYDROCHLORIDE 1 MG/ML
4 INJECTION, SOLUTION INTRAMUSCULAR; INTRAVENOUS ONCE
Status: COMPLETED | OUTPATIENT
Start: 2025-03-31 | End: 2025-03-31

## 2025-03-31 RX ORDER — HYDRALAZINE HYDROCHLORIDE 20 MG/ML
15 INJECTION INTRAMUSCULAR; INTRAVENOUS EVERY 6 HOURS PRN
Status: DISCONTINUED | OUTPATIENT
Start: 2025-03-31 | End: 2025-04-05 | Stop reason: HOSPADM

## 2025-03-31 RX ADMIN — DEXMEDETOMIDINE 0.8 MCG/KG/HR: 100 INJECTION, SOLUTION INTRAVENOUS at 21:39

## 2025-03-31 RX ADMIN — DEXMEDETOMIDINE 0.8 MCG/KG/HR: 100 INJECTION, SOLUTION INTRAVENOUS at 13:30

## 2025-03-31 RX ADMIN — INSULIN GLARGINE 14 UNITS: 100 INJECTION, SOLUTION SUBCUTANEOUS at 20:31

## 2025-03-31 RX ADMIN — Medication 0.5 MG: at 20:50

## 2025-03-31 RX ADMIN — MIDAZOLAM HYDROCHLORIDE 1 MG: 1 INJECTION, SOLUTION INTRAMUSCULAR; INTRAVENOUS at 04:53

## 2025-03-31 RX ADMIN — METOCLOPRAMIDE 10 MG: 5 INJECTION, SOLUTION INTRAMUSCULAR; INTRAVENOUS at 04:23

## 2025-03-31 RX ADMIN — HEPARIN SODIUM 5000 UNITS: 5000 INJECTION INTRAVENOUS; SUBCUTANEOUS at 13:41

## 2025-03-31 RX ADMIN — SODIUM CHLORIDE: 0.9 INJECTION, SOLUTION INTRAVENOUS at 08:48

## 2025-03-31 RX ADMIN — CEFEPIME 2000 MG: 2 INJECTION, POWDER, FOR SOLUTION INTRAVENOUS at 20:54

## 2025-03-31 RX ADMIN — HYDRALAZINE HYDROCHLORIDE 15 MG: 20 INJECTION INTRAMUSCULAR; INTRAVENOUS at 03:35

## 2025-03-31 RX ADMIN — CEFEPIME 2000 MG: 2 INJECTION, POWDER, FOR SOLUTION INTRAVENOUS at 09:50

## 2025-03-31 RX ADMIN — HEPARIN SODIUM 5000 UNITS: 5000 INJECTION INTRAVENOUS; SUBCUTANEOUS at 05:31

## 2025-03-31 RX ADMIN — DOPAMINE HYDROCHLORIDE IN DEXTROSE 1 MCG/KG/MIN: 1.6 INJECTION, SOLUTION INTRAVENOUS at 08:49

## 2025-03-31 RX ADMIN — Medication 50 MCG/HR: at 04:56

## 2025-03-31 RX ADMIN — Medication 50 MCG: at 05:10

## 2025-03-31 RX ADMIN — HYDRALAZINE HYDROCHLORIDE 15 MG: 20 INJECTION INTRAMUSCULAR; INTRAVENOUS at 20:34

## 2025-03-31 RX ADMIN — HYDRALAZINE HYDROCHLORIDE 15 MG: 20 INJECTION INTRAMUSCULAR; INTRAVENOUS at 13:41

## 2025-03-31 RX ADMIN — SODIUM CHLORIDE 40 MG: 9 INJECTION, SOLUTION INTRAMUSCULAR; INTRAVENOUS; SUBCUTANEOUS at 08:52

## 2025-03-31 RX ADMIN — MIDAZOLAM HYDROCHLORIDE 2 MG/HR: 5 INJECTION, SOLUTION INTRAMUSCULAR; INTRAVENOUS at 23:17

## 2025-03-31 RX ADMIN — INSULIN LISPRO 4 UNITS: 100 INJECTION, SOLUTION INTRAVENOUS; SUBCUTANEOUS at 08:52

## 2025-03-31 RX ADMIN — Medication 0.5 MG: at 17:46

## 2025-03-31 RX ADMIN — INSULIN LISPRO 4 UNITS: 100 INJECTION, SOLUTION INTRAVENOUS; SUBCUTANEOUS at 13:41

## 2025-03-31 RX ADMIN — INSULIN LISPRO 2 UNITS: 100 INJECTION, SOLUTION INTRAVENOUS; SUBCUTANEOUS at 20:32

## 2025-03-31 RX ADMIN — MIDAZOLAM HYDROCHLORIDE 2 MG: 1 INJECTION, SOLUTION INTRAMUSCULAR; INTRAVENOUS at 21:36

## 2025-03-31 RX ADMIN — MIDAZOLAM HYDROCHLORIDE 2 MG/HR: 5 INJECTION, SOLUTION INTRAMUSCULAR; INTRAVENOUS at 04:53

## 2025-03-31 RX ADMIN — DEXMEDETOMIDINE 0.8 MCG/KG/HR: 100 INJECTION, SOLUTION INTRAVENOUS at 04:38

## 2025-03-31 RX ADMIN — DEXMEDETOMIDINE 1 MCG/KG/HR: 100 INJECTION, SOLUTION INTRAVENOUS at 10:00

## 2025-03-31 RX ADMIN — MIDAZOLAM HYDROCHLORIDE 4 MG: 1 INJECTION, SOLUTION INTRAMUSCULAR; INTRAVENOUS at 22:36

## 2025-03-31 RX ADMIN — FENTANYL CITRATE 50 MCG/HR: 50 INJECTION, SOLUTION INTRAMUSCULAR; INTRAVENOUS at 23:20

## 2025-03-31 RX ADMIN — INSULIN LISPRO 2 UNITS: 100 INJECTION, SOLUTION INTRAVENOUS; SUBCUTANEOUS at 00:26

## 2025-03-31 RX ADMIN — DEXMEDETOMIDINE 0.9 MCG/KG/HR: 100 INJECTION, SOLUTION INTRAVENOUS at 00:58

## 2025-03-31 ASSESSMENT — PULMONARY FUNCTION TESTS
PIF_VALUE: 27
PIF_VALUE: 28
PIF_VALUE: 28
PIF_VALUE: 26
PIF_VALUE: 28
PIF_VALUE: 30
PIF_VALUE: 18
PIF_VALUE: 18
PIF_VALUE: 29
PIF_VALUE: 18
PIF_VALUE: 18
PIF_VALUE: 29
PIF_VALUE: 28
PIF_VALUE: 27
PIF_VALUE: 29
PIF_VALUE: 27
PIF_VALUE: 27
PIF_VALUE: 31
PIF_VALUE: 26
PIF_VALUE: 27
PIF_VALUE: 28
PIF_VALUE: 26
PIF_VALUE: 28
PIF_VALUE: 27
PIF_VALUE: 28
PIF_VALUE: 27
PIF_VALUE: 28
PIF_VALUE: 28
PIF_VALUE: 18
PIF_VALUE: 29
PIF_VALUE: 27
PIF_VALUE: 26
PIF_VALUE: 29
PIF_VALUE: 27
PIF_VALUE: 26
PIF_VALUE: 27
PIF_VALUE: 25
PIF_VALUE: 18
PIF_VALUE: 27
PIF_VALUE: 18
PIF_VALUE: 28
PIF_VALUE: 26
PIF_VALUE: 18
PIF_VALUE: 28
PIF_VALUE: 28
PIF_VALUE: 30
PIF_VALUE: 27
PIF_VALUE: 28
PIF_VALUE: 18
PIF_VALUE: 29
PIF_VALUE: 29
PIF_VALUE: 31
PIF_VALUE: 27
PIF_VALUE: 19
PIF_VALUE: 29
PIF_VALUE: 29
PIF_VALUE: 27

## 2025-03-31 NOTE — PROGRESS NOTES
End Of Shift Note  [facility]  Summary of shift: Pt had eventful night. Began shift on 0.5mcg of Precedex. By end of shift Fentanyl and Versed were restarted as well due to patient trying to self extubate and ripping out ART line in the process. Plan today is to have potential Neuro consult today.      Vitals:    Vitals:    03/31/25 0600 03/31/25 0610 03/31/25 0615 03/31/25 0630   BP: (!) 100/43  (!) 159/57 (!) 168/57   Pulse: 65 64 68 70   Resp: 10 12 15 (!) 7   Temp:       TempSrc:       SpO2: 97% 97%     Weight: 88 kg (194 lb)      Height:            I&O:   Intake/Output Summary (Last 24 hours) at 3/31/2025 0639  Last data filed at 3/31/2025 0631  Gross per 24 hour   Intake 2494.28 ml   Output 3000 ml   Net -505.72 ml       Resp Status: Vent. C/D    Ventilator Settings:  Vent Mode: AC/PRVC Resp Rate (Set): 12 bpm/Vt (Set, mL): 550 mL/ /FiO2 : 30 %    Critical Care IV infusions:   dexmedeTOMIDine (PRECEDEX) 400 mcg in sodium chloride 0.9 % 100 mL infusion 1 mcg/kg/hr (03/31/25 0631)    midazolam 1 mg/hr (03/31/25 0631)    fentaNYL 25 mcg/hr (03/31/25 0631)    dextrose      DOPamine 2 mcg/kg/min (03/31/25 0631)    sodium chloride      sodium chloride      sodium chloride 75 mL/hr at 03/31/25 0631        LDA:   Peripheral IV 03/28/25 Distal;Right Forearm (Active)   Number of days: 3       Peripheral IV 03/28/25 Left;Posterior Hand (Active)   Number of days: 3       Peripheral IV 03/28/25 Proximal;Right;Anterior Forearm (Active)   Number of days: 3       NG/OG/NJ/NE Tube Orogastric 16 fr Center mouth (Active)   Number of days: 3       Urinary Catheter 03/28/25 (Active)   Number of days: 3       ETT  (Active)   Number of days: 3       Midline 03/28/25 Right Brachial (Active)   Number of days: 2

## 2025-03-31 NOTE — FLOWSHEET NOTE
03/30/25 2010   Treatment Team Notification   Reason for Communication Evaluate   Name of Team Member Notified SArmond Rivero   Treatment Team Role Advanced Practice Nurse   Method of Communication Secure Message   Response See orders   Notification Time 2010     Pt noted to have 350 for residual. Notified on call NP. Ordered to hold and decrease rate to 10mL/hr

## 2025-03-31 NOTE — CARE COORDINATION
Discharge planning    Patient intubated. FIO2 at 30%. Off pressors. Fentanyl running. Plan to possibly CPAP today. May need neuro consult. From ind living apt. Has rollator and sc. Family wants [facility], if snf needed. Referral sent.

## 2025-03-31 NOTE — FLOWSHEET NOTE
03/31/25 0414   Treatment Team Notification   Reason for Communication Evaluate   Name of Team Member Notified NANCY Reyes   Treatment Team Role Advanced Practice Nurse   Method of Communication Secure Message   Response See orders   Notification Time 0414     Pt had residual of 495 with tube feed being off for 4 hours. Sent message to on call NP. Order for one time dose of Reglan 10mg ordered

## 2025-03-31 NOTE — PLAN OF CARE
Problem: Respiratory - Adult  Goal: Achieves optimal ventilation and oxygenation  3/31/2025 0752 by Sherine Floyd RCP  Outcome: Progressing  3/30/2025 1916 by Krystal Rico RCP  Outcome: Progressing  3/30/2025 1915 by Haritha Garner  Outcome: Progressing  Goal: Able to breathe comfortably  3/31/2025 0752 by Sherine Floyd RCP  Outcome: Progressing  3/30/2025 1916 by Krystal Rico RCP  Outcome: Progressing  Goal: Adequate oxygenation  Description: Adequate oxygenation  3/31/2025 0752 by Sherine Floyd RCP  Outcome: Progressing  3/30/2025 1916 by Krystal Rico RCP  Outcome: Progressing  Goal: Will be able to breathe spontaneously, without ventilator support  Description: Will be able to breathe spontaneously, without ventilator support  3/31/2025 0752 by Sherine Floyd RCP  Outcome: Progressing  3/30/2025 1916 by Krystal Rico RCP  Outcome: Progressing  Goal: Adequate oxygenation  Description: Adequate oxygenation  3/31/2025 0752 by Sherine Floyd RCP  Outcome: Progressing  3/30/2025 1916 by Krystal Rico RCP  Outcome: Progressing

## 2025-03-31 NOTE — PROGRESS NOTES
Versed shut off this morning for sedation vacation.    Patient woke up sitting straight up in bed, RR into the mid 20s (up from vent settings of 12), Pressures in the high 170s. Patient pulling against restraints attempting to reach tube, moved legs off side of bed. RN and family member attempting to keep pt in bed. Patient unable to follow commands, was not redirectable.     Versed restarted.

## 2025-03-31 NOTE — PROGRESS NOTES
Renal Progress Note    Patient :  Aracelis Jimenez; 74 y.o. MRN# [id number]  Location:  2027/2027-01  Attending:  Shanell Denney MD  Admit Date:  3/28/2025   Hospital Day: 3      Subjective:     Patient seen and examined at bedside.  She is intubated and sedated.  Family present.  She is not following commands.  Not currently extubated well.  Continues on low-dose dopamine.  Apparently very sensitive to it.  Every time it is turned off of pressure drops into the low 100s.  Maps go below 50.  Urine output continues to be excellent put out close to 3 L of urine.  Lozano catheter in place.  IV cefepime continues for presumed pneumonia.  IV fluids continue, total fluids 120 mL an hour.  No plan for tube feeds at present.  Renal function improving creatinine down to 1.2.  Maintaining adequate heart rate.  Transvenous pacer has been discontinued.  No plan for any further intervention by cardiology.   Labs today showed a sodium of 141 potassium 3.7 chloride 107 bicarb 22 BUN 23 creatinine 1.2 calcium 8.6.  ABG pH 7.38 pCO2 38 pO2 93 bicarb 22    History reviewed:  Known history of morbid obesity, hypertension, chronic kidney disease stage IIIb baseline 1.4-1.6 secondary to diabetic nephrosclerosis has seen Karine Macias in the office for CKD care.  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  On the day of admission she developed severe abdominal pain and EMS was called, patient was found to be bradycardic with a heart rate in the 30s and hypotensive.  Was placed transcutaneously and transferred to [facility], was given atropine.  Lab work on presentation showed a creatinine of 2.0, potassium of 6.1 and a blood sugar of 496. In the hospital cardiology was consulted found to have junctional bradycardia, transvenous pacer was placed by cardiology and dopamine drip was continued and EP was consulted. CT abdomen pelvis without IV contrast 3/28/2025 showed dependent opacities in the lung bases  vesicular breath sounds, no rales or wheezes.  Cardiac:  S1 S2 RR, no murmurs, gallops or rubs, JVP not raised.  Abdomen: Soft, non-tender, no masses or organomegaly, BS audible.  :   +Lozano catheter in place  Neuro:  AAO x 3, No FND.  SKIN:  No rashes, good skin turgor.  Extremities:  + Upper extremity edema  Labs:       Recent Labs     03/29/25  0545 03/30/25  0450 03/31/25  0723   WBC 13.8* 9.7 5.1   RBC 4.06 3.71* 3.83*   HGB 12.8 11.5* 11.9   HCT 36.4 32.6* 34.2*   MCV 89.7 87.9 89.3   MCH 31.5 31.0 31.1   MCHC 35.2* 35.3* 34.8   RDW 13.6 13.2 13.2   * 113* 89*   MPV 10.2 9.7 10.2      BMP:   Recent Labs     03/29/25  0545 03/30/25  0450 03/31/25  0723    135* 141   K 4.2 3.7 3.7    103 107   CO2 21 20 22   BUN 34* 27* 23   CREATININE 1.4* 1.4* 1.2*   GLUCOSE 238* 250* 268*   CALCIUM 9.1 8.5* 8.6*      Phosphorus:   No results for input(s): \"PHOS\" in the last 72 hours.  Magnesium:    Recent Labs     03/28/25  1354   MG 1.7     Albumin:  No results for input(s): \"LABALBU\" in the last 72 hours.  BNP:    No results found for: \"BNP\"  OLIVIA:      Lab Results   Component Value Date/Time    OLIVIA NEGATIVE 11/04/2024 11:13 AM     SPEP:  Lab Results   Component Value Date/Time    ALBCAL 3.2 11/04/2024 11:13 AM    ALBPCT 52 11/04/2024 11:13 AM    A1PCT 5 11/04/2024 11:13 AM    A2PCT 17 11/04/2024 11:13 AM    BETAPCT 11 11/04/2024 11:13 AM    GAMGLOB 0.9 11/04/2024 11:13 AM    GGPCT 15 11/04/2024 11:13 AM    PATH ELECTRONICALLY SIGNED. KAROLINA PIMENTEL MD 11/04/2024 11:13 AM     UPEP:   No results found for: \"LABPE\"  C3:     Lab Results   Component Value Date/Time    C3 146 11/04/2024 11:13 AM     C4:     Lab Results   Component Value Date/Time    C4 23 11/04/2024 11:13 AM     MPO ANCA:     Lab Results   Component Value Date/Time    MPO <0.3 11/04/2024 11:13 AM     PR3 ANCA:     Lab Results   Component Value Date/Time    PR3 <0.7 11/04/2024 11:13 AM     Anti-GBM:   No results found for: \"GBMABIGG\"  Hep  etiological factor needs to be considered.  Currently patient has been discontinued.  3. Hypotension likely secondary to to bradycardia, resolved.  4. Hyperkalemia-likely due to extracellular shift from  hyperglycemia, underlying renal dysfunction, metabolic acidosis, decreased distal sodium delivery and complicated by lisinopril usage at home, potassium gone up to 6.1 resolved  5. Acute hypoxic respiratory failure requiring mechanical ventilation.  6. Hyperglycemia doing better.  7. Metabolic acidosis.,  Resolved  8. Lactic acidosis from low flow.  9. Diabetes type 2 with fluctuating control.  10. Hypertension on ACE inhibitors.  11. CKD 3 likely due to diabetic and hypertensive nephrosclerosis with baseline creatinine seems to be around 1.3-1.5 mg/dl and follows up in our clinic last seen DARYL Muñoz - CNP 12/2024.  12. History of recurrent E. coli UTIs.  13. Bladder incontinence versus neurogenic bladder.   14.  Morbid obesity    Plan:   1.  Decrease normal saline to 50 mL an hour keep total fluids at less than 100 mL an hour, no need for diuretics  2.  Wean off pressors  3.  Strict intake output record   4.  Bradycardia management per cardiology  5.  Can discontinue Lozano  6.  Optimize glycemic control  7.  Continue to hold ACE inhibitors  8.  Creatinine now at baseline, renal function recovered, nephrology signing off please call for questions  9.  Suggest BMP 1 week after discharge  10.  Outpatient follow-up with D[facility]L Mu[facility]oz for [facility] in 2 to 3 weeks  11.  Nephrology signing off please call for questions    Nutrition   Please ensure that patient is on a renal diet/TF. Avoid nephrotoxic drugs/contrast exposure.    We will continue to follow along with you.     Electronically signed by Hernandez Nascimento MD on 3/31/2025 at 9:48 AM

## 2025-03-31 NOTE — PROGRESS NOTES
Patient currently 0.6 precedex and 25 fentanyl. Intermittently sitting up in bed, reaching for tube, but tires easily.    When asked to squeeze hands, move feet, or open eyes, she shakes her head no. Seems purposeful, but not fully clear.

## 2025-03-31 NOTE — PROGRESS NOTES
Pulmonary Critical Care Progress Note    Patient seen for the follow up of Respiratory failure requiring intubation     Subjective:    She is still intubated sedated unresponsive on fentanyl 25 Precedex at 1.2.  She gets agitated does not follow commands on decreasing sedation.  She was on Versed that was just taken off.  She is on dopamine at 0.5/kg/min.  She has stable blood pressure.    Examination:    Vitals: BP (!) 105/54   Pulse 52   Temp 97.6 °F (36.4 °C) (Temporal)   Resp 12   Ht 1.575 m (5' 2.01\")   Wt 88 kg (194 lb)   SpO2 98%   BMI 35.47 kg/m²   SpO2  Av.2 %  Min: 93 %  Max: 100 %  General appearance: Intubated sedated unresponsive  Neck: No JVD  Lungs: Decreased breath sound no crackles or wheezing  Heart: regular rate and rhythm, S1, S2 normal, no gallop  Abdomen: Soft, non tender, + BS  Extremities: no cyanosis or clubbing. No significant edema    LABs:    CBC:   Recent Labs     25  0450 25  0723   WBC 9.7 5.1   HGB 11.5* 11.9   HCT 32.6* 34.2*   * 89*     BMP:   Recent Labs     25  0450 25  0723   * 141   K 3.7 3.7   CO2 20 22   BUN 27* 23   CREATININE 1.4* 1.2*   LABGLOM 39* 50*   GLUCOSE 250* 268*     PT/INR:   Recent Labs     25  0545   PROTIME 14.3*   INR 1.1     APTT:No results for input(s): \"APTT\" in the last 72 hours.  LIVER PROFILE:  Recent Labs     25  0545   AST 45*   ALT 52*       Radiology:    Chest x-ray 3/31 reviewed  1. Endotracheal tube tip is approximately 2.9 cm above the mac.  2. Low lung volumes with bibasilar opacities, likely atelectasis.     Impression/recommendations;    Acute hypoxic respiratory failure  Assist-control ventilation FiO2 50% PEEP 8 respiratory 20 tidal volume 550  Sedation with Precedex drip RASS -2  Sedation with fentanyl RASS -2 wean first  Hold off tube feeds  Sedation vacation/CPAP trial and following commands  Versed 2 mg IV push every hour as needed for severe agitation     Bradycardia ?

## 2025-03-31 NOTE — FLOWSHEET NOTE
03/31/25 1749   Vitals   Pulse (!) 104   Respirations 25   BP (!) 167/135   MAP (Calculated) 146   MAP (mmHg) 146   RASS   Lyman Agitation Sedation Scale (RASS) +3   Oxygen Therapy   SpO2 93 %   FiO2  30 %       Precedex restarted at this time and a dose of ativan given. Patient gagging on tube attempting to dislodge it. Shaking head violently, sitting up in bed, still not following commands.

## 2025-03-31 NOTE — FLOWSHEET NOTE
03/31/25 0010   Treatment Team Notification   Reason for Communication Evaluate   Name of Team Member Notified SArmond Mat   Treatment Team Role Advanced Practice Nurse   Method of Communication Secure Message   Response See orders   Notification Time 0010     Pt noted to have 450 residual. Notified on call. Ordered to hold TF for 4 hours. Recheck residual in 4 hours and change Blood glucose to Q4hrs

## 2025-03-31 NOTE — PROGRESS NOTES
[facility]   Progress Note                   Date:   3/31/2025  Patient name: Aracelis Jimenez  Date of admission:  3/28/2025  2:47 AM  MRN:   [id number]  YOB: 1950  PCP: Francisca Daniels MD    Reason for Admission: Hyperkalemia [E87.5]  Bradycardia [R00.1]  Acute kidney injury [N17.9]  Respiratory failure requiring intubation [J96.90]    Subjective:       Clinical Changes / Abnormalities: Pt seen and examined in the room.  Patient resting in bed with family and RN at bedside. Pt sedated on vent.  Labs, vitals and tele reviewed- SR 67  Dopamine drip is infusing. Has been titirated up/down for BP support. HR stable.     Medications:   Scheduled Meds:   insulin glargine  10 Units SubCUTAneous Nightly    cefepime  2,000 mg IntraVENous Q12H    sodium chloride flush  5-40 mL IntraVENous 2 times per day    heparin (porcine)  5,000 Units SubCUTAneous 3 times per day    insulin lispro  0-8 Units SubCUTAneous Q6H    sodium chloride flush  5-40 mL IntraVENous 2 times per day    lidocaine 1 % injection  50 mg IntraDERmal Once    pantoprazole (PROTONIX) 40 mg in sodium chloride (PF) 0.9 % 10 mL injection  40 mg IntraVENous Daily     Continuous Infusions:   dexmedeTOMIDine (PRECEDEX) 400 mcg in sodium chloride 0.9 % 100 mL infusion 1 mcg/kg/hr (03/31/25 0631)    midazolam 1 mg/hr (03/31/25 0631)    fentaNYL 25 mcg/hr (03/31/25 0631)    dextrose      DOPamine 1 mcg/kg/min (03/31/25 0849)    sodium chloride      sodium chloride      sodium chloride 75 mL/hr at 03/31/25 0848     CBC:   Recent Labs     03/29/25  0545 03/30/25  0450 03/31/25  0723   WBC 13.8* 9.7 5.1   HGB 12.8 11.5* 11.9   * 113* 89*     BMP:    Recent Labs     03/29/25  0545 03/30/25  0450 03/31/25  0723    135* 141   K 4.2 3.7 3.7    103 107   CO2 21 20 22   BUN 34* 27* 23   CREATININE 1.4* 1.4* 1.2*   GLUCOSE 238* 250* 268*     Hepatic:   Recent Labs     03/29/25  0545   AST 45*   ALT 52*   BILITOT 0.5   ALKPHOS 103  cystitis     ALPHONSE (acute kidney injury)     Facial droop     Intracranial vascular stenosis     Intracranial atherosclerosis     Recurrent urinary tract infection     Diabetic autonomic neuropathy associated with type 2 diabetes mellitus (HCC)     Respiratory failure requiring intubation (HCC)     Acute kidney injury     Symptomatic bradycardia      Plan of Treatment:   HR stable. BP stable with dopamine drip infusing. Wean off as tolerated  Continue ventilatory support; Pulmonary following  Continue holding Toprol, lisinopril/ HCTZ and amlodipine; nephrology following.  PRN Lasix  No indications for permanent pacing at this time.   Will continue to monitor     Electronically signed by DARYL Erwin CNP on 3/31/2025 at 9:23 AM  [facility]  [phone]

## 2025-03-31 NOTE — PLAN OF CARE
Problem: Safety - Medical Restraint  Goal: Remains free of injury from restraints (Restraint for Interference with Medical Device)  Description: INTERVENTIONS:  1. Determine that other, less restrictive measures have been tried or would not be effective before applying the restraint  2. Evaluate the patient's condition at the time of restraint application  3. Inform patient/family regarding the reason for restraint  4. Q2H: Monitor safety, psychosocial status, comfort, nutrition and hydration  Outcome: Progressing  Flowsheets  Taken 3/31/2025 1600  Remains free of injury from restraints (restraint for interference with medical device):   Determine that other, less restrictive measures have been tried or would not be effective before applying the restraint   Evaluate the patient's condition at the time of restraint application   Every 2 hours: Monitor safety, psychosocial status, comfort, nutrition and hydration   Inform patient/family regarding the reason for restraint  Taken 3/31/2025 0800  Remains free of injury from restraints (restraint for interference with medical device):   Evaluate the patient's condition at the time of restraint application   Determine that other, less restrictive measures have been tried or would not be effective before applying the restraint   Inform patient/family regarding the reason for restraint   Every 2 hours: Monitor safety, psychosocial status, comfort, nutrition and hydration     Problem: Safety - Adult  Goal: Free from fall injury  Outcome: Progressing     Problem: Discharge Planning  Goal: Discharge to home or other facility with appropriate resources  Outcome: Progressing     Problem: Neurosensory - Adult  Goal: Achieves stable or improved neurological status  Outcome: Progressing     Problem: Respiratory - Adult  Goal: Achieves optimal ventilation and oxygenation  3/31/2025 1724 by Cheryl Barnes, RN  Outcome: Progressing  3/31/2025 0752 by Sherine Floyd RCP  Outcome:

## 2025-03-31 NOTE — FLOWSHEET NOTE
03/31/25 0311   Treatment Team Notification   Reason for Communication Evaluate;Abnormal vitals   Name of Team Member Notified Dr. Resendez   Treatment Team Role Consulting Provider   Method of Communication Secure Message   Response See orders   Notification Time 0311     Pt had elevated SBP in the 180's on ART line correlating with BP cuff. Dopamine had been off since shortly after midnight. Messaged on call Cardiologist. New order for PRN Hydralazine 15mg obtained

## 2025-03-31 NOTE — PROGRESS NOTES
[facility]  Office: [phone]  Ulises Yoo DO, Glenn Patterson DO, Fernando Wilson DO, Reji Gagnon DO, Radha Grayson MD, Shanell Denney MD, Juanis Zaragoza MD, Carmela Jennings MD,  Kali Harp MD, Mitra Willson MD, Alex Adair MD,  Pamela Hodge DO, Ar Pizano MD, Jyalen Sepulveda MD, Raoul Yoo DO, Katina Deras MD,  Lawrence Davila DO, Belinda Whalen MD, Crystal Rai MD, Lance Spence MD,  Wilson Granger MD, Dom Rogers MD, Bhavana Wyatt MD, Kylah Richmond MD, Humphrey Ureña MD, Guille Garcia MD, Pedro Hernandez DO, Yasir Parker MD, Pamela Patterson MD, Mohsin Reza, MD, Shirley Waterhouse, CNP,  Sridevi Muñoz, CNP, Pedro Kuhn, CNP,  Maria G Norman, DNP, Edelmira Oviedo, CNP, Niki Hernandez, CNP, Penelope Reyes, CNP, Shiela Oropeza, CNP, RAVIN Tovar-C, Екатерина Thomas, CNP, Nena Baltazar, CNP,  Iliana Rivero, CNP, Simona Foley, CNP, Jimena Castillo, CNP,  Veronica Hogue, CNS, Miladys Matamoros, CNP, Kirsty Blanco, CNP,   Rosaura Wolff, CNP         [facility]   IN-PATIENT SERVICE   [facility]    Progress Note    3/31/2025    7:52 AM    Name:   Aracelis Jimenez  MRN:     [id number]     Acct:      [id number]   Room:   2027/2027-01  IP Day:  3  Admit Date:  3/28/2025  2:47 AM    PCP:   Francisca Daniels MD  Code Status:  Full Code    Subjective:     C/C:   Chief Complaint   Patient presents with    Altered Mental Status     Interval History Status: improved.     Pt's gets agitated on Versed and fentanyl.  She was switched to Precedex yesterday and wasn't responding much.  Everything has been weaned off today and she's shaking her head \"No\" and tracking her daughter's voice.  She still won't follow commands.  No Cpap trials started yet  but respiratory plans to try later today.  Her family is in the room.    Brief History:     Aracelis Jimenez is a 74 y.o. Non- / non  female who presents with Altered Mental Status   and is admitted to the

## 2025-03-31 NOTE — PROGRESS NOTES
Comprehensive Nutrition Assessment    Type and Reason for Visit:  Reassess    Nutrition Recommendations/Plan:   NPO diet  Modify tube feeding to Vital AF 1.2 Mono goal rate 51 mL/hr (1224 mL total volume). Water flush 30 mL every 4 hours  Monitor tube feeding status, GI function and labs     Malnutrition Assessment:  Malnutrition Status:  At risk for malnutrition (03/28/25 1840)        Nutrition Assessment:    Patient was started on Vital AF 1.2 Mono  tube feedings and reached goal rate of 55 mL/hr yesterday (3/30). RN reports patient had gastric residual of 500 mL this morning and tube feeding was stopped. Patient has not been following commands and trying to pull at OG tube. Patient put on Precedex for agitation. RN reports patient is either awake or very sedated. Consider restarting tube feedings at a trickle (10 mL/hr) to test tolerance since patient will not be extubated. Tube feeding goal rate modified to 51 mL/hr (1224 mL total volume). Monitor vent status, tube feeding status, GI function and labs.    Nutrition Related Findings:    Edema: +2 BUE, Hypoactive bowel sounds. Intubated and sedated. Precedex Wound Type: None       Current Nutrition Intake & Therapies:    Average Meal Intake: NPO  Average Supplements Intake: NPO  Diet NPO  ADULT TUBE FEEDING; Orogastric; Peptide Based; Continuous; 10; Yes; 15; Q 6 hours; 51; 30; Q 4 hours  Current Tube Feeding (TF) Orders:  Feeding Route: Orogastric  Formula: Peptide Based  Schedule: Continuous  Feeding Regimen: Vital AF 1.2 Mono goal rate 51 mL/hr (1224 mL total volume)  Additives/Modulars: None  Water Flushes: 30 mL every 4 hours (180 mL free water)  Current TF Provides: 1469 kcal, 92 gm of protein and 1173 mL free water    Anthropometric Measures:  Height: 157.5 cm (5' 2.01\")  Ideal Body Weight (IBW): 110 lbs (50 kg)       Current Body Weight: 88 kg (194 lb 0.1 oz), 176.4 % IBW. Weight Source: Bed scale  Current BMI (kg/m2): 35.5                             BMI

## 2025-03-31 NOTE — PROGRESS NOTES
[facility]   Progress Note                   Date:   3/30/25  Patient name: Aracelis Jimenez  Date of admission:  3/28/2025  2:47 AM  MRN:   [id number]  YOB: 1950  PCP: Francisca Daniels MD    Reason for Admission:     Subjective:       Clinical Changes / Abnormalities:  Pt remains stable with sinus rates 60-65 bpm with IV dopamine weaned to 1 mcg/kg/min.  With lightening of sedation today she remains poorly responsive.      Medications:   Scheduled Meds:   metoclopramide  10 mg IntraVENous Once    insulin glargine  10 Units SubCUTAneous Nightly    cefepime  2,000 mg IntraVENous Q12H    sodium chloride flush  5-40 mL IntraVENous 2 times per day    heparin (porcine)  5,000 Units SubCUTAneous 3 times per day    insulin lispro  0-8 Units SubCUTAneous Q6H    sodium chloride flush  5-40 mL IntraVENous 2 times per day    lidocaine 1 % injection  50 mg IntraDERmal Once    pantoprazole (PROTONIX) 40 mg in sodium chloride (PF) 0.9 % 10 mL injection  40 mg IntraVENous Daily     Continuous Infusions:   dexmedeTOMIDine (PRECEDEX) 400 mcg in sodium chloride 0.9 % 100 mL infusion 0.9 mcg/kg/hr (03/31/25 0211)    midazolam Stopped (03/30/25 1651)    fentaNYL Stopped (03/30/25 1651)    dextrose      DOPamine 1 mcg/kg/min (03/31/25 0416)    sodium chloride      sodium chloride      sodium chloride 75 mL/hr at 03/31/25 0211     CBC:   Recent Labs     03/29/25  0545 03/30/25  0450   WBC 13.8* 9.7   HGB 12.8 11.5*   * 113*     BMP:    Recent Labs     03/28/25  1354 03/29/25  0545 03/30/25  0450   * 139 135*   K 3.2* 4.2 3.7   CL 98 106 103   CO2 28 21 20   BUN 36* 34* 27*   CREATININE 1.5* 1.4* 1.4*   GLUCOSE 496* 238* 250*     Hepatic:   Recent Labs     03/29/25  0545   AST 45*   ALT 52*   BILITOT 0.5   ALKPHOS 103     Troponin: No results for input(s): \"TROPONINI\" in the last 72 hours.  BNP: No results for input(s): \"BNP\" in the last 72 hours.  Lipids: No results for input(s): \"CHOL\", \"HDL\" in  the last 72 hours.    Invalid input(s): \"LDLCALCU\"  INR:   Recent Labs     03/29/25  0545   INR 1.1       Objective:   Vitals: BP (!) 187/66   Pulse 59   Temp 97 °F (36.1 °C) (Temporal)   Resp 12   Ht 1.575 m (5' 2.01\")   Wt 86.7 kg (191 lb 3.2 oz)   SpO2 98%   BMI 34.96 kg/m²   General appearance: Intubated and sedated  HEENT: Head: Normocephalic, no lesions, without obvious abnormality.  Neck: no adenopathy, no carotid bruit, no JVD, supple, symmetrical, trachea midline and thyroid not enlarged, symmetric, no tenderness/mass/nodules  Lungs: clear to auscultation bilaterally  Heart: regular rate and rhythm, S1, S2 normal, no murmur, click, rub or gallop  Abdomen: soft, non-tender; bowel sounds normal; no masses,  no organomegaly  Extremities: extremities normal, atraumatic, no cyanosis or edema  Neurologic: Mental status: Intubated and sedated      2D ECHO ( 3/28/25)       Left Ventricle: Normal left ventricular systolic function with a visually estimated EF of 55 - 60%. EF by visual approximation is 55%. Left ventricle size is normal. Normal wall motion. Grade I diastolic dysfunction with normal LAP.    Right Ventricle: Right ventricle is mildly dilated. Mildly reduced systolic function.    Mitral Valve: Mild regurgitation.    Image quality is adequate. Technically difficult study.         IMPRESSION:        Junctional bradycardia, now resolved, due to DKA with acidosis, hyperkalemia and recent increase in metoprolol dose  Type II DM with DKA, improving  Acute hypoxic respiratory failure; suspected MARILU  Essential HTN with preserved LVEF  CKD Stage III b  H/o CVA  Acute encephalopathy           RECOMMENDATIONS:     No indications for permanent pacing at the present time  Wean dopamine off  Continue ventilatory support; Pulmonary following  Continue holding Toprol, lisinopril/ HCTZ and amlodipine; nephrology following.  PRN Lasix    Electronically signed by Yony Resendez MD on 3/31/2025 at 4:21 AM  [facility]

## 2025-03-31 NOTE — PROGRESS NOTES
End Of Shift Note  [facility]  Summary of shift: Patient had eventful shift. Dopamine off since 1233. 1x dose hydralazine given. Sedation weaned completely off. During that time patient did not follow any commands, has not been able to all day. Does shake head no when asked to do a command. Minimal eye contact made, otherwise has upward gaze. Tolerated CPAP about two hours until sat up in bed 1749 RASS +3 and precedex was restarted with a prn dose ativan. Urine output 950 from jones.     Try to keep fentanyl and versed off. PRN dose ativan and push Versed ordered.     Vitals:    Vitals:    03/31/25 1544 03/31/25 1545 03/31/25 1600 03/31/25 1749   BP:  124/60 (!) 95/59 (!) 167/135   Pulse: 66 66 64 (!) 104   Resp: (!) 5  (!) 0 25   Temp:   98 °F (36.7 °C)    TempSrc:   Temporal    SpO2: 96% 96% 96% 93%   Weight:       Height:            I&O:   Intake/Output Summary (Last 24 hours) at 3/31/2025 1806  Last data filed at 3/31/2025 1805  Gross per 24 hour   Intake 2423.98 ml   Output 2275 ml   Net 148.98 ml       Resp Status: Vent    Ventilator Settings:  Vent Mode: (S) AC/PRVC Resp Rate (Set): 12 bpm/Vt (Set, mL): 550 mL/ /FiO2 : 30 %    Critical Care IV infusions:   dexmedeTOMIDine (PRECEDEX) 400 mcg in sodium chloride 0.9 % 100 mL infusion 0.2 mcg/kg/hr (03/31/25 1803)    midazolam Stopped (03/31/25 1109)    fentaNYL Stopped (03/31/25 1517)    dextrose      DOPamine Stopped (03/31/25 1233)    sodium chloride      sodium chloride      sodium chloride 50 mL/hr at 03/31/25 1803        LDA:   Peripheral IV 03/28/25 Distal;Right Forearm (Active)   Number of days: 3       Peripheral IV 03/28/25 Left;Posterior Hand (Active)   Number of days: 3       Peripheral IV 03/28/25 Proximal;Right;Anterior Forearm (Active)   Number of days: 3       NG/OG/NJ/NE Tube Orogastric 16 fr Center mouth (Active)   Number of days: 3       Urinary Catheter 03/28/25 (Active)   Number of days: 3       ETT  (Active)   Number of days: 3        Midline 03/28/25 Right Brachial (Active)   Number of days: 3

## 2025-04-01 ENCOUNTER — APPOINTMENT (OUTPATIENT)
Dept: CT IMAGING | Age: 75
End: 2025-04-01
Payer: MEDICARE

## 2025-04-01 ENCOUNTER — APPOINTMENT (OUTPATIENT)
Dept: GENERAL RADIOLOGY | Age: 75
End: 2025-04-01
Payer: MEDICARE

## 2025-04-01 PROBLEM — G25.2 ACTION TREMOR: Status: ACTIVE | Noted: 2025-04-01

## 2025-04-01 PROBLEM — E87.5 HYPERKALEMIA: Status: ACTIVE | Noted: 2025-04-01

## 2025-04-01 PROBLEM — R56.9 SEIZURE-LIKE ACTIVITY (HCC): Status: ACTIVE | Noted: 2025-04-01

## 2025-04-01 LAB
ALLEN TEST: POSITIVE
ANION GAP SERPL CALCULATED.3IONS-SCNC: 12 MMOL/L (ref 9–16)
BASOPHILS # BLD: <0.03 K/UL (ref 0–0.2)
BASOPHILS NFR BLD: 0 % (ref 0–2)
BUN SERPL-MCNC: 19 MG/DL (ref 8–23)
CALCIUM SERPL-MCNC: 8.6 MG/DL (ref 8.8–10.2)
CHLORIDE SERPL-SCNC: 105 MMOL/L (ref 98–107)
CO2 SERPL-SCNC: 19 MMOL/L (ref 20–31)
CREAT SERPL-MCNC: 0.9 MG/DL (ref 0.5–0.9)
EOSINOPHIL # BLD: 0.24 K/UL (ref 0–0.44)
EOSINOPHILS RELATIVE PERCENT: 4 % (ref 1–4)
ERYTHROCYTE [DISTWIDTH] IN BLOOD BY AUTOMATED COUNT: 13.3 % (ref 11.8–14.4)
FIO2: 35
GFR, ESTIMATED: 64 ML/MIN/1.73M2
GLUCOSE BLD-MCNC: 161 MG/DL (ref 65–105)
GLUCOSE BLD-MCNC: 190 MG/DL (ref 65–105)
GLUCOSE BLD-MCNC: 210 MG/DL (ref 65–105)
GLUCOSE BLD-MCNC: 212 MG/DL (ref 65–105)
GLUCOSE BLD-MCNC: 228 MG/DL (ref 65–105)
GLUCOSE SERPL-MCNC: 201 MG/DL (ref 82–115)
HCT VFR BLD AUTO: 36.1 % (ref 36.3–47.1)
HGB BLD-MCNC: 11.9 G/DL (ref 11.9–15.1)
IMM GRANULOCYTES # BLD AUTO: 0.02 K/UL (ref 0–0.3)
IMM GRANULOCYTES NFR BLD: 0 %
LYMPHOCYTES NFR BLD: 1.17 K/UL (ref 1.1–3.7)
LYMPHOCYTES RELATIVE PERCENT: 22 % (ref 24–43)
MCH RBC QN AUTO: 31 PG (ref 25.2–33.5)
MCHC RBC AUTO-ENTMCNC: 33 G/DL (ref 28.4–34.8)
MCV RBC AUTO: 94 FL (ref 82.6–102.9)
MODE: ABNORMAL
MONOCYTES NFR BLD: 0.48 K/UL (ref 0.1–1.2)
MONOCYTES NFR BLD: 9 % (ref 3–12)
NEGATIVE BASE EXCESS, ART: 4.7 MMOL/L (ref 0–2)
NEUTROPHILS NFR BLD: 65 % (ref 36–65)
NEUTS SEG NFR BLD: 3.51 K/UL (ref 1.5–8.1)
NRBC BLD-RTO: 0 PER 100 WBC
O2 DELIVERY DEVICE: ABNORMAL
PLATELET # BLD AUTO: 99 K/UL (ref 138–453)
PMV BLD AUTO: 10.3 FL (ref 8.1–13.5)
POC HCO3: 21.5 MMOL/L (ref 21–28)
POC O2 SATURATION: 92.9 % (ref 94–98)
POC PCO2: 42.9 MM HG (ref 35–48)
POC PH: 7.31 (ref 7.35–7.45)
POC PO2: 73 MM HG (ref 83–108)
POTASSIUM SERPL-SCNC: 4 MMOL/L (ref 3.7–5.3)
PROCALCITONIN SERPL-MCNC: 0.25 NG/ML (ref 0–0.09)
RBC # BLD AUTO: 3.84 M/UL (ref 3.95–5.11)
SAMPLE SITE: ABNORMAL
SODIUM SERPL-SCNC: 136 MMOL/L (ref 136–145)
T4 FREE SERPL-MCNC: 1.1 NG/DL (ref 0.93–1.7)
TSH SERPL DL<=0.05 MIU/L-ACNC: 13.6 UIU/ML (ref 0.27–4.2)
WBC OTHER # BLD: 5.4 K/UL (ref 3.5–11.3)

## 2025-04-01 PROCEDURE — 2500000003 HC RX 250 WO HCPCS: Performed by: INTERNAL MEDICINE

## 2025-04-01 PROCEDURE — 6360000002 HC RX W HCPCS: Performed by: NURSE PRACTITIONER

## 2025-04-01 PROCEDURE — 2580000003 HC RX 258: Performed by: INTERNAL MEDICINE

## 2025-04-01 PROCEDURE — 36600 WITHDRAWAL OF ARTERIAL BLOOD: CPT

## 2025-04-01 PROCEDURE — 70450 CT HEAD/BRAIN W/O DYE: CPT

## 2025-04-01 PROCEDURE — 84439 ASSAY OF FREE THYROXINE: CPT

## 2025-04-01 PROCEDURE — 2700000000 HC OXYGEN THERAPY PER DAY

## 2025-04-01 PROCEDURE — 6370000000 HC RX 637 (ALT 250 FOR IP): Performed by: INTERNAL MEDICINE

## 2025-04-01 PROCEDURE — 36415 COLL VENOUS BLD VENIPUNCTURE: CPT

## 2025-04-01 PROCEDURE — 94761 N-INVAS EAR/PLS OXIMETRY MLT: CPT

## 2025-04-01 PROCEDURE — 2000000000 HC ICU R&B

## 2025-04-01 PROCEDURE — 99233 SBSQ HOSP IP/OBS HIGH 50: CPT | Performed by: STUDENT IN AN ORGANIZED HEALTH CARE EDUCATION/TRAINING PROGRAM

## 2025-04-01 PROCEDURE — 6360000002 HC RX W HCPCS: Performed by: INTERNAL MEDICINE

## 2025-04-01 PROCEDURE — 85025 COMPLETE CBC W/AUTO DIFF WBC: CPT

## 2025-04-01 PROCEDURE — 99233 SBSQ HOSP IP/OBS HIGH 50: CPT | Performed by: NURSE PRACTITIONER

## 2025-04-01 PROCEDURE — 82803 BLOOD GASES ANY COMBINATION: CPT

## 2025-04-01 PROCEDURE — 31720 CLEARANCE OF AIRWAYS: CPT

## 2025-04-01 PROCEDURE — 84145 PROCALCITONIN (PCT): CPT

## 2025-04-01 PROCEDURE — 82947 ASSAY GLUCOSE BLOOD QUANT: CPT

## 2025-04-01 PROCEDURE — 6360000002 HC RX W HCPCS

## 2025-04-01 PROCEDURE — 80048 BASIC METABOLIC PNL TOTAL CA: CPT

## 2025-04-01 PROCEDURE — 99291 CRITICAL CARE FIRST HOUR: CPT

## 2025-04-01 PROCEDURE — 99223 1ST HOSP IP/OBS HIGH 75: CPT | Performed by: PSYCHIATRY & NEUROLOGY

## 2025-04-01 PROCEDURE — 94003 VENT MGMT INPAT SUBQ DAY: CPT

## 2025-04-01 PROCEDURE — 2500000003 HC RX 250 WO HCPCS

## 2025-04-01 PROCEDURE — 84443 ASSAY THYROID STIM HORMONE: CPT

## 2025-04-01 PROCEDURE — 71045 X-RAY EXAM CHEST 1 VIEW: CPT

## 2025-04-01 PROCEDURE — 2580000003 HC RX 258

## 2025-04-01 PROCEDURE — 94640 AIRWAY INHALATION TREATMENT: CPT

## 2025-04-01 RX ORDER — IPRATROPIUM BROMIDE AND ALBUTEROL SULFATE 2.5; .5 MG/3ML; MG/3ML
1 SOLUTION RESPIRATORY (INHALATION)
Status: DISCONTINUED | OUTPATIENT
Start: 2025-04-01 | End: 2025-04-03

## 2025-04-01 RX ORDER — FUROSEMIDE 10 MG/ML
40 INJECTION INTRAMUSCULAR; INTRAVENOUS ONCE
Status: COMPLETED | OUTPATIENT
Start: 2025-04-01 | End: 2025-04-01

## 2025-04-01 RX ORDER — ALBUTEROL SULFATE 0.83 MG/ML
2.5 SOLUTION RESPIRATORY (INHALATION) AS NEEDED
Status: DISCONTINUED | OUTPATIENT
Start: 2025-04-01 | End: 2025-04-03

## 2025-04-01 RX ORDER — METOCLOPRAMIDE HYDROCHLORIDE 5 MG/ML
10 INJECTION INTRAMUSCULAR; INTRAVENOUS ONCE
Status: COMPLETED | OUTPATIENT
Start: 2025-04-01 | End: 2025-04-01

## 2025-04-01 RX ADMIN — WATER 2000 MG: 1 INJECTION INTRAMUSCULAR; INTRAVENOUS; SUBCUTANEOUS at 21:35

## 2025-04-01 RX ADMIN — FUROSEMIDE 40 MG: 10 INJECTION, SOLUTION INTRAMUSCULAR; INTRAVENOUS at 13:54

## 2025-04-01 RX ADMIN — Medication 0.5 MG: at 13:54

## 2025-04-01 RX ADMIN — CEFEPIME 2000 MG: 2 INJECTION, POWDER, FOR SOLUTION INTRAVENOUS at 09:11

## 2025-04-01 RX ADMIN — DEXMEDETOMIDINE 0.2 MCG/KG/HR: 100 INJECTION, SOLUTION INTRAVENOUS at 15:35

## 2025-04-01 RX ADMIN — INSULIN LISPRO 2 UNITS: 100 INJECTION, SOLUTION INTRAVENOUS; SUBCUTANEOUS at 00:55

## 2025-04-01 RX ADMIN — METOCLOPRAMIDE HYDROCHLORIDE 10 MG: 5 INJECTION INTRAMUSCULAR; INTRAVENOUS at 00:56

## 2025-04-01 RX ADMIN — SODIUM CHLORIDE: 0.9 INJECTION, SOLUTION INTRAVENOUS at 06:18

## 2025-04-01 RX ADMIN — Medication 50 MCG: at 01:37

## 2025-04-01 RX ADMIN — DEXMEDETOMIDINE 0.2 MCG/KG/HR: 100 INJECTION, SOLUTION INTRAVENOUS at 09:02

## 2025-04-01 RX ADMIN — SODIUM CHLORIDE, PRESERVATIVE FREE 10 ML: 5 INJECTION INTRAVENOUS at 09:12

## 2025-04-01 RX ADMIN — DEXMEDETOMIDINE 1 MCG/KG/HR: 100 INJECTION, SOLUTION INTRAVENOUS at 21:25

## 2025-04-01 RX ADMIN — SODIUM CHLORIDE, PRESERVATIVE FREE 10 ML: 5 INJECTION INTRAVENOUS at 21:40

## 2025-04-01 RX ADMIN — Medication 50 MCG: at 02:51

## 2025-04-01 RX ADMIN — SODIUM CHLORIDE 40 MG: 9 INJECTION, SOLUTION INTRAMUSCULAR; INTRAVENOUS; SUBCUTANEOUS at 09:04

## 2025-04-01 RX ADMIN — Medication 0.5 MG: at 08:56

## 2025-04-01 RX ADMIN — Medication 50 MCG: at 06:27

## 2025-04-01 RX ADMIN — IPRATROPIUM BROMIDE AND ALBUTEROL SULFATE 1 DOSE: .5; 2.5 SOLUTION RESPIRATORY (INHALATION) at 21:32

## 2025-04-01 RX ADMIN — IPRATROPIUM BROMIDE AND ALBUTEROL SULFATE 1 DOSE: .5; 2.5 SOLUTION RESPIRATORY (INHALATION) at 13:53

## 2025-04-01 ASSESSMENT — PULMONARY FUNCTION TESTS
PIF_VALUE: 21
PIF_VALUE: 16
PIF_VALUE: 16
PIF_VALUE: 26
PIF_VALUE: 8
PIF_VALUE: 26
PIF_VALUE: 16
PIF_VALUE: 27

## 2025-04-01 ASSESSMENT — PAIN SCALES - GENERAL: PAINLEVEL_OUTOF10: 0

## 2025-04-01 NOTE — SIGNIFICANT EVENT
[facility]  Office: [phone]  Ulises Yoo DO, Glenn Patterson DO, Fernando Wilson DO, Reji Gagnon, DO, Radha Grayson MD, Shanell Denney MD, Juanis Zaragoza MD, Carmela Jennings MD,  Kali Harp MD, Mitra Willson MD, Alex Adair MD,  Pamela Hodge DO, rA Pizano MD, Jaylen Sepulveda MD, Raoul Yoo DO, Katina Deras MD,  Lawrence Davila DO, Zamzam Osorio MD, Belinda Whalen MD, Crystal Rai MD, Lance Spence MD,  Wilson Granger MD, Dom Rogers MD, Bhavana Wyatt MD, Kylah Richmond MD, Humphrey Ureña MD, Guille Garcia MD, Jack Pendleton DO, Andres Ace DO, Rossy Cadet MD,  Yasir Parker MD, Shirley Waterhouse, CNP,  Sridevi Muñoz, CNP, Pedro Kuhn, CNP,  Maria G Norman, DNP, Edelmira Oviedo, CNP, Niki Hernandez, CNP, Miladys Matamoros CNP, Penelope Reyes, CNP, Shiela Oropeza, CNP, Doris Tolliver, PA-C, Prachi Reddy, PA-C, Екатерина Thomas, CNP, Vanessa Avila, CNP, Jimena Castillo, CNP, Veronica Hogue, CNS, Krystal Powell, CNP, Kirsty Blanco, CNP, Tracy Schwab, CNP         [facility]   IN-PATIENT SERVICE   [facility]    Second Visit Note  For more detailed information please refer to the progress note of the day      4/1/2025    4:44 AM    Name:   Aracelis Jimenez  MRN:     [id number]     Acct:      [id number]   Room:   2027/2027-01  IP Day:  4  Admit Date:  3/28/2025  2:47 AM    PCP:   Francisca Daniels MD  Code Status:  Full Code      Pt vitals were reviewed   New labs were reviewed   Patient was seen    Rapid response was initiated after stopping reports patient with possible seizure activity lasting less than 1 minute.  Upon arrival to bedside, patient without seizure activity with fentanyl and Versed currently infusing.  Plans to obtain CT scan and nursing to notify neurology.      Updated plan :     Check CT head and notify neurology CT head returned without acute findings  Continue Versed for sedation, may benefit from propofol however defer to

## 2025-04-01 NOTE — PROGRESS NOTES
[facility]   Progress Note                   Date:   4/1/2025  Patient name: Aracelis Jimenez  Date of admission:  3/28/2025  2:47 AM  MRN:   [id number]  YOB: 1950  PCP: Francisca Daniels MD    Reason for Admission: Hyperkalemia [E87.5]  Bradycardia [R00.1]  Acute kidney injury [N17.9]  Respiratory failure requiring intubation [J96.90]    Subjective:       Clinical Changes / Abnormalities: Pt seen and examined in the room.  Patient resting in bed with family and RN at bedside. Patientr was just extubated, somewhat restless, she was on precedex prior to extubation and still somewhat drowsy. Labs, vitals and tele reviewed- ST, 105    Dopamine was discontinued   Medications:   Scheduled Meds:   insulin glargine  14 Units SubCUTAneous Nightly    cefepime  2,000 mg IntraVENous Q12H    sodium chloride flush  5-40 mL IntraVENous 2 times per day    heparin (porcine)  5,000 Units SubCUTAneous 3 times per day    insulin lispro  0-8 Units SubCUTAneous Q6H    sodium chloride flush  5-40 mL IntraVENous 2 times per day    lidocaine 1 % injection  50 mg IntraDERmal Once    pantoprazole (PROTONIX) 40 mg in sodium chloride (PF) 0.9 % 10 mL injection  40 mg IntraVENous Daily     Continuous Infusions:   fentaNYL 75 mcg/hr (04/01/25 0628)    dexmedeTOMIDine (PRECEDEX) 400 mcg in sodium chloride 0.9 % 100 mL infusion 0.2 mcg/kg/hr (04/01/25 0902)    dextrose      DOPamine Stopped (03/31/25 1233)    sodium chloride      sodium chloride      sodium chloride 50 mL/hr at 04/01/25 0627     CBC:   Recent Labs     03/30/25  0450 03/31/25  0723 04/01/25  0337   WBC 9.7 5.1 5.4   HGB 11.5* 11.9 11.9   * 89* 99*     BMP:    Recent Labs     03/30/25  0450 03/31/25  0723 04/01/25  0337   * 141 136   K 3.7 3.7 4.0    107 105   CO2 20 22 19*   BUN 27* 23 19   CREATININE 1.4* 1.2* 0.9   GLUCOSE 250* 268* 201*     Hepatic:   No results for input(s): \"AST\", \"ALT\", \"BILITOT\", \"ALKPHOS\" in the last 72  hours.    Invalid input(s): \"ALB\"    Troponin: No results for input(s): \"TROPHS\" in the last 72 hours.  BNP: No results for input(s): \"BNP\" in the last 72 hours.  Lipids: No results for input(s): \"CHOL\", \"HDL\" in the last 72 hours.    Invalid input(s): \"LDLCALCU\"  INR:   No results for input(s): \"INR\" in the last 72 hours.      Objective:   Vitals: BP (!) 142/71   Pulse (!) 111   Temp 99.6 °F (37.6 °C) (Axillary)   Resp 20   Ht 1.575 m (5' 2\")   Wt 88 kg (194 lb)   SpO2 92%   BMI 35.48 kg/m²   General appearance: intubated/sedated on vent  HEENT: Head: Normocephalic, no lesions, without obvious abnormality.  Neck: no JVD, trachea midline, no adenopathy  Lungs: Clear to auscultation  Heart: Regular rate and rhythm, s1/s2 auscultated, no murmurs  Abdomen: soft, non-tender, bowel sounds active  Extremities: no edema  Neurologic: not done    2D ECHO (3/28/25)      Left Ventricle: Normal left ventricular systolic function with a visually estimated EF of 55 - 60%. EF by visual approximation is 55%. Left ventricle size is normal. Normal wall motion. Grade I diastolic dysfunction with normal LAP.    Right Ventricle: Right ventricle is mildly dilated. Mildly reduced systolic function.    Mitral Valve: Mild regurgitation.    Image quality is adequate. Technically difficult study.    Assessment / Acute Cardiac Problems:   Junctional bradycardia, now resolved, due to DKA with acidosis, hyperkalemia and recent increase in metoprolol dose  Type II DM with DKA, improving  Acute hypoxic respiratory failure; suspected MARILU  Essential HTN with preserved LVEF  CKD Stage III b  H/o CVA  Acute encephalopathy    Patient Active Problem List:     Primary hypertension     Mixed hyperlipidemia     Type 2 diabetes mellitus with hyperglycemia, with long-term current use of insulin (HCC)     Acute ischemic stroke (HCC)     Hypothyroidism     Cerebrovascular accident (CVA) (HCC)-2 years back     CKD stage 3 due to type 2 diabetes mellitus

## 2025-04-01 NOTE — PLAN OF CARE
Problem: Safety - Medical Restraint  Goal: Remains free of injury from restraints (Restraint for Interference with Medical Device)  Description: INTERVENTIONS:  1. Determine that other, less restrictive measures have been tried or would not be effective before applying the restraint  2. Evaluate the patient's condition at the time of restraint application  3. Inform patient/family regarding the reason for restraint  4. Q2H: Monitor safety, psychosocial status, comfort, nutrition and hydration  Recent Flowsheet Documentation  Taken 3/31/2025 2000 by Leoncio Orlando RN  Remains free of injury from restraints (restraint for interference with medical device):   Determine that other, less restrictive measures have been tried or would not be effective before applying the restraint   Evaluate the patient's condition at the time of restraint application   Inform patient/family regarding the reason for restraint   Every 2 hours: Monitor safety, psychosocial status, comfort, nutrition and hydration     Problem: Respiratory - Adult  Goal: Achieves optimal ventilation and oxygenation  Recent Flowsheet Documentation  Taken 3/31/2025 2000 by Leoncio Orlando RN  Achieves optimal ventilation and oxygenation:   Assess for changes in respiratory status   Assess for changes in mentation and behavior   Position to facilitate oxygenation and minimize respiratory effort   Oxygen supplementation based on oxygen saturation or arterial blood gases   Initiate smoking cessation protocol as indicated   Encourage broncho-pulmonary hygiene including cough, deep breathe, incentive spirometry   Assess the need for suctioning and aspirate as needed   Assess and instruct to report shortness of breath or any respiratory difficulty   Respiratory therapy support as indicated     Problem: Cardiovascular - Adult  Goal: Maintains optimal cardiac output and hemodynamic stability  Recent Flowsheet Documentation  Taken 3/31/2025 2000 by Leoncio Orlando  RN  Maintains optimal cardiac output and hemodynamic stability:   Monitor blood pressure and heart rate   Monitor urine output and notify Licensed Independent Practitioner for values outside of normal range   Assess for signs of decreased cardiac output   Administer fluid and/or volume expanders as ordered   Administer vasoactive medications as ordered     Problem: Cardiovascular - Adult  Goal: Absence of cardiac dysrhythmias or at baseline  Recent Flowsheet Documentation  Taken 3/31/2025 2000 by Leoncio Orlando RN  Absence of cardiac dysrhythmias or at baseline:   Monitor cardiac rate and rhythm   Assess for signs of decreased cardiac output   Administer antiarrhythmia medication and electrolyte replacement as ordered

## 2025-04-01 NOTE — PROGRESS NOTES
0440: Poss seizure activty. Pupils fixed and non reactive. Turned off vidhya BURNS at bedside. Orders for Stat CT Head and to notify Neuro.

## 2025-04-01 NOTE — PROGRESS NOTES
Physical Therapy  DATE: 2025    NAME: Aracelis Jimenez  MRN: [id number]   : 1950    Patient not seen this date for Physical Therapy due to:      [] Cancel by RN or physician due to:    [] Hemodialysis    [] Critical Lab Value Level     [] Blood transfusion in progress    [x] Acute or unstable cardiovascular status:  Writer attempted to see pt for PT evaluation.  Upon arrival to room, BERENICE Ashton present at bedside attempting to calm pt as pt is restless & difficult to redirect.  HR 120s;  SpO2 88-89%; and BP elevated SBP 180s.  PT will hold at this time and ck back as able.    _MAP < 55 or more than >115  _HR < 40 or > 130    [] Acute or unstable pulmonary status   -FiO2 > 60%   _RR < 5 or >40    _O2 sats < 85%    [] Strict Bedrest    [] Off Unit for surgery or procedure    [] Off Unit for testing       [] Pending imaging to R/O fracture    [] Refusal by Patient      [] Other      [] PT being discontinued at this time. Patient independent. No further needs.     [] PT being discontinued at this time as the patient has been transferred to hospice care. No further needs.      Kristen Wade, PT

## 2025-04-01 NOTE — PROGRESS NOTES
Neuro calls unit. Update given to MD. Per MD, unsure if true seizure. Feels its more a parkinson tremor. Per MD, will order EEG and MRI

## 2025-04-01 NOTE — PLAN OF CARE
Problem: Safety - Medical Restraint  Goal: Remains free of injury from restraints (Restraint for Interference with Medical Device)  Description: INTERVENTIONS:  1. Determine that other, less restrictive measures have been tried or would not be effective before applying the restraint  2. Evaluate the patient's condition at the time of restraint application  3. Inform patient/family regarding the reason for restraint  4. Q2H: Monitor safety, psychosocial status, comfort, nutrition and hydration  Outcome: Progressing  Flowsheets  Taken 4/1/2025 0800 by Pamella Mott RN  Remains free of injury from restraints (restraint for interference with medical device):   Determine that other, less restrictive measures have been tried or would not be effective before applying the restraint   Evaluate the patient's condition at the time of restraint application   Inform patient/family regarding the reason for restraint   Every 2 hours: Monitor safety, psychosocial status, comfort, nutrition and hydration  Taken 3/31/2025 2000 by Leoncio Orlando, RN  Remains free of injury from restraints (restraint for interference with medical device):   Determine that other, less restrictive measures have been tried or would not be effective before applying the restraint   Evaluate the patient's condition at the time of restraint application   Inform patient/family regarding the reason for restraint   Every 2 hours: Monitor safety, psychosocial status, comfort, nutrition and hydration     Problem: Safety - Adult  Goal: Free from fall injury  Outcome: Progressing     Problem: Discharge Planning  Goal: Discharge to home or other facility with appropriate resources  Outcome: Progressing  Flowsheets (Taken 3/31/2025 2000 by Leoncio Orlando, RN)  Discharge to home or other facility with appropriate resources:   Identify barriers to discharge with patient and caregiver   Arrange for needed discharge resources and transportation as appropriate

## 2025-04-01 NOTE — PROGRESS NOTES
[facility]  Office: [phone]  Ulises Yoo DO, Glenn Patterson DO, Fernando Wilson DO, Reji Gagnon DO, Radha Grayson MD, Shanell Denney MD, Juanis Zaragoza MD, Carmela Jennings MD,  Kali Harp MD, Mitra Willson MD, Alex Adair MD,  Pamela Hodge DO, Ar Pizano MD, Jaylen Sepulveda MD, Raoul Yoo DO, Katina Deras MD,  Lawrence Davila DO, Belinda Whalen MD, Crystal Rai MD, Lanec Spence MD,  Wilson Granger MD, Dom Rogers MD, Bhavana Wyatt MD, Kylah Richmond MD, Humphrey Ureña MD, Guille Garcia MD, Pedro Hernandez DO, Yasir Parker MD, Pamela Patterson MD, Mohsin Reza, MD, Carlie Cohen MD, Shirley Waterhouse, CNP,  Sridevi Muñoz, CNP, Pedro Kuhn, CNP,  Maria G Norman, DNP, Edelmira Oviedo, CNP, Niki Hernandez, CNP, Penelope Reyes, CNP, Shiela Oropeza, CNP, Doris Tolliver, PA-C, Екатерина Thomas, CNP, Nena Baltazar, CNP,  Iliana Rivero, CNP, Simona Foley, CNP, Ramin Villegas, PA-C, Jimena Castillo, CNP,  Veronica Hogue, CNS, Miladys Matamoros, CNP, Kirsty Blanco, CNP,   Rosaura Wolff, CNP         [facility]   IN-PATIENT SERVICE   [facility]    Progress Note    4/1/2025    8:46 AM    Name:   Aracelis Jimenez  MRN:     [id number]     Acct:      [id number]   Room:   2027/2027-01  IP Day:  4  Admit Date:  3/28/2025  2:47 AM    PCP:   Francisca Daniels MD  Code Status:  Full Code    Subjective:     C/C:   Chief Complaint   Patient presents with    Altered Mental Status     Interval History Status: improved.     Patient seen examined at bedside.  Daughter granddaughter also at bedside.  Patient remains on the ventilator at this time.  Currently on CPAP respiratory rate is about 27-30.  Pulling good tidal volumes.  Seems little anxious.  Sedation has been off for some time.  Patient has been on CPAP for about an hour and a half and is doing fairly well.  Responds to commands doing better on the right upper extremity and lower extremity.  Is able to squeeze

## 2025-04-01 NOTE — CARE COORDINATION
DC Planning    CHART REVIEW     Pt extubated today. Now on 5l. Plan for [place] when stable. Will need to watch for LTACH.

## 2025-04-01 NOTE — PROGRESS NOTES
End Of Shift Note  [facility]  Summary of shift: Patient had an eventful day. Began SAT around 0700 and patient did well so SBT was initiated and passed. Patient was extubated. She is oriented to self and able to identify family and friends. Voice is hoarse but appropriate conversation through most of shift. As shift went on patient became more restless. Precedex restarted and patient is now having confused conversations.     Patient was able to sit edge of bed with assistance and PT/OT was consulted.     MRI and EEG were postponed today due to restlessness and will be re-evaluated tomorrow. Patient did not have any seizure like activity throughout the shift.     Vitals:    Vitals:    04/01/25 1500 04/01/25 1600 04/01/25 1700 04/01/25 1800   BP: (!) 189/70 (!) 174/66 (!) 164/68 (!) 166/69   Pulse: (!) 126 (!) 118 (!) 107 96   Resp: 24 20 25 22   Temp:  98.3 °F (36.8 °C)     TempSrc:  Axillary     SpO2: (!) 88% 90% 93% 94%   Weight:       Height:            I&O:   Intake/Output Summary (Last 24 hours) at 4/1/2025 1917  Last data filed at 4/1/2025 1904  Gross per 24 hour   Intake 1459.15 ml   Output 1705 ml   Net -245.85 ml       Resp Status: rhonchi throughout all lobes worse on right. 6L NC    Ventilator Settings:  Vent Mode: CPAP/PS Resp Rate (Set): 12 bpm/Vt (Set, mL): 550 mL/ /FiO2 : 100 %    Critical Care IV infusions:   dexmedeTOMIDine (PRECEDEX) 400 mcg in sodium chloride 0.9 % 100 mL infusion 0.6 mcg/kg/hr (04/01/25 1904)    dextrose      DOPamine Stopped (03/31/25 1233)    sodium chloride      sodium chloride      sodium chloride 50 mL/hr at 04/01/25 1904        LDA:   Peripheral IV 04/01/25 Distal;Left;Ventral Cephalic (Active)   Number of days: 0       Urinary Catheter 03/28/25 (Active)   Number of days: 4       Midline 03/28/25 Right Brachial (Active)   Number of days: 4

## 2025-04-01 NOTE — PROGRESS NOTES
Writer attempted to sedate with 4mg IVP Versed once in attempt to keep off cont gtts. Patient than began to fail arms and legs and high kicked legs. Sedation restarted at this time. Care ongoing at this time.

## 2025-04-01 NOTE — PROGRESS NOTES
Pulmonary Critical Care Progress Note    Patient seen for the follow up of Respiratory failure requiring intubation     Subjective:    She has been tolerating CPAP.  I was contacted by RN advised to decrease pressure support from 12 to 8.  Obtain ABGs.  She is off dopamine off sedation.  She has stable blood pressure.  Still not following commands.  The transvenous pacemaker   was removed on Friday I discussed with Dr. Fitch at that time.    Examination:    Vitals: BP (!) 160/56   Pulse (!) 110   Temp 99.6 °F (37.6 °C) (Axillary)   Resp 25   Ht 1.575 m (5' 2\")   Wt 88 kg (194 lb)   SpO2 91%   BMI 35.48 kg/m²   SpO2  Av.6 %  Min: 90 %  Max: 99 %  General appearance: Agitated not following commands  Neck: No JVD  Lungs: Decreased breath sound no crackles or wheezing  Heart: regular rate and rhythm, S1, S2 normal, no gallop  Abdomen: Soft, non tender, + BS  Extremities: no cyanosis or clubbing. No significant edema    LABs:    CBC:   Recent Labs     25   WBC 5.1 5.4   HGB 11.9 11.9   HCT 34.2* 36.1*   PLT 89* 99*     BMP:   Recent Labs     25  033    136   K 3.7 4.0   CO2 22 19*   BUN 23 19   CREATININE 1.2* 0.9   LABGLOM 50* 64   GLUCOSE 268* 201*      Latest Reference Range & Units 25 08:27   POC HCO3 21.0 - 28.0 mmol/L 21.5   POC O2 SAT 94.0 - 98.0 % 92.9 (L)   POC pCO2 35.0 - 48.0 mm Hg 42.9   POC pH 7.350 - 7.450  7.308 (L)   POC PO2 83.0 - 108.0 mm Hg 73.0 (L)   (L): Data is abnormally low   Latest Reference Range & Units 25 05:45 25 08:51 25 03:37   Procalcitonin 0.00 - 0.09 ng/mL 0.58 (H) 0.32 (H) 0.25 (H)   (H): Data is abnormally high    Radiology:  Chest x-ray   The endotracheal tube and enteric tube remain in place.The cardiac and  mediastinal contours appear unchanged. No new airspace disease identified in  the interval. No evidence for pneumothorax.    Chest x-ray 3/31 reviewed  1. Endotracheal tube tip is  approximately 2.9 cm above the mac.  2. Low lung volumes with bibasilar opacities, likely atelectasis.     Impression/recommendations;    Acute hypoxic respiratory failure  Extubated to oxygen nasal cannula  Hold off tube feeds  BiPAP support as needed  Precedex for agitation     Bradycardia ?  Related to beta-blocker/sick sinus syndrome status post transvenous pacemaker  Cardiology on consult/check echocardiogram  Monitor heart rate   Off beta-blockers  Check BNP       Pneumonia?  Aspiration/atelectasis   Switch cefepime to Rocephin  Repeat chest x-ray     hyperkalemia/ ALPHONSE on CKD/hyponatremia  Nephrology on consult     Hyperglycemia with diabetes/hypothyroidism  Monitor blood sugar/insulin scale     History of stroke/peripheral vascular disease  Neurology on consult/MRI plan/hold for now to see how she does after extubation    Obesity suspect sleep apnea  Outpatient sleep evaluation     discussed with RN  Discussed with daughter at bedside  Peptic ulcer disease prophylaxis  DVT prophylaxis         Prabhu Bills MD, MD, [facility]  Pulmonary Critical Care and Sleep Medicine,  [facility]  Cell: [phone]  Office: [phone]

## 2025-04-01 NOTE — PROGRESS NOTES
Writer attempted to start patient on TF. Residual checked prior to iniation, output remained 300 from previous assessment. JOSH Reyes paged at this time for orders. Care ongoing.    Update: CNP orders one time dose of reglan at this time.

## 2025-04-01 NOTE — PROGRESS NOTES
Physician Progress Note      PATIENT:               LADARIUS HORTON  CSN #:                  [id number]  :                       1950  ADMIT DATE:       3/28/2025 2:47 AM  DISCH DATE:  RESPONDING  PROVIDER #:        Alex SONG MD          QUERY TEXT:    Acute respiratory failure is documented in the medical record in cardiology   consult note. Based on your medical judgment, please clarify the reason for   intubation.    The patient?s clinical indicators include:  Patient to ED by EMS with low heart rate, low blood pressure and underwent   transcutaneous pacing in ED.  -Noted risk factors of low blood pressure, DM2  *Per ED provider note, \"mental status is decreased... patient is not able to   protect her airway. Patient was therefore intubated\"  *Per H&P, \"presents with Altered Mental Status  and is admitted to the hospital for the management of Respiratory failure   requiring intubation...Acute resp failure s/p intubation\" and \"she was   intubated in the ER because she wasn't protecting her airway.\"  *Per cardiology consult note, \"respiratory failure\"  *Per  IM note, \"Patient was intubated in the ER due to airway protection.\"  RR on 3/28: 7, 25, 16, 18, 21, 21, 22, 18, 20, 0, 20, 0, 19, 20  Spo2 on 3/28: 100% on arrival----- 81% on 3/28 @ 0337--- 100% @ 0346  ABG: pco2 31.6, po2 475.1, hco3 20.2, negative base excess 3.6, o2 sat 100  Per oxygen flowsheet, ventilator began at 0415 on 3/28  3/28 CXR:  1. Endotracheal tube and NG tube in proper positions.  2. Small bilateral pleural effusions and mild interstitial pulmonary edema.  3. Curvilinear opacity within the right lung base likely reflects atelectasis.      Treatment: labs, imaging, pulm consult, intubation  Options provided:  -- Intubated for Acute Respiratory Failure as evidenced by, Please document   evidence.  -- Intubated for airway protection only, Acute Respiratory Failure ruled out   after study  -- Other - I will add my own    pacemaker insertion, intubation, IV dopamine  Options provided:  -- Cardiogenic Shock  -- Hypotension without Shock  -- Hypovolemic Shock  -- Other - I will add my own diagnosis  -- Disagree - Not applicable / Not valid  -- Disagree - Clinically unable to determine / Unknown  -- Refer to Clinical Documentation Reviewer    PROVIDER RESPONSE TEXT:    This patient is in hypovolemic shock.    Query created by: Haylie Cox on 4/1/2025 12:18 PM      Electronically signed by:  Alex SONG MD 4/1/2025 3:28 PM

## 2025-04-01 NOTE — PROGRESS NOTES
Order obtained for extubation.  SpO2 of 93% on 30% FiO2.   Patient extubated and placed on 5 liters/min via nasal cannula.   Post extubation SpO2 is 92% with HR  98 bpm and RR 20 breaths/min.    Patient had mild cough that was non-productive.  Extubation Well tolerated by patient..   Breath Sounds: Dim on left , rhonchi on right.    Jennifer Dozier RCP   12:38 PM

## 2025-04-01 NOTE — PROGRESS NOTES
Occupational Therapy  [facility]  Occupational Therapy Not Seen Note    Patient not available for Occupational Therapy due to:    [] Testing:    [] Hemodialysis    [x] Cancelled by RN: Attempted to see pt in PM for OT neftali. BERENICE Ashton in room reporting pt is tachycardic, hypertensive and desatting. Pt restless and difficult to redirect. OT will continue to follow and check back as appropriate.    []Refusal by Patient:    [] Surgery:     [] Intubation:     [] Pain Medication:    [] Sedation:     [] Spine Precautions :    [] Medical Instability:    [] Other:    Oksana HULL, OT

## 2025-04-01 NOTE — PROGRESS NOTES
Patient frequently failing arms and legs and throwing legs over side rail. PRNS already given and Precedex closely . MAXWELL Bills paged inquring if other agents can be restarted if needed. Per MD, ok to restart now for RASS -2 and if patient is still agitated VERSED 4 MG IVP

## 2025-04-01 NOTE — CONSULTS
NEUROLOGY INPATIENT CONSULT NOTE    4/1/2025         Aracelis Jimenez is a  74 y.o. female admitted on 3/28/2025 with  Hyperkalemia [E87.5]  Bradycardia [R00.1]  Acute kidney injury [N17.9]  Respiratory failure requiring intubation [J96.90]    Reason for consult: Acute metabolic encephalopathy and not following commands off sedation on ventilator   History is obtained mostly from the medical record and from patient's daughter at bedside and the caregivers. Chart is reviewed and patient is examined.   Briefly, this is a  74 y.o. female with hx of HTN, diabetes, CKD and prior cva and hx of tremors was admitted on 3/28/2025 with bradycardia and altered mentation.  Patient was sitting on the toilet and having difficulty having a bowel movement.  As per patient's family; her antihypertensive medications were recently adjusted and then onwards patient was feeling tired and lightheaded over the past 2 weeks.   On arrival by EMS, patient was found to be bradycardic and hypotensive.  Patient was given atropine without improvement; then she was given Versed and transcutaneous pacing was started.  On arrival to ER, patient's mentation decreased and patient is unable to protect her airway therefore was intubated.  Patient has been on Versed drip.  This early morning patient has seizure-like activity lasting for about 1 minute.  Caregivers witnessed that episode and described it as shaking/tremoring of both upper extremities with eyes staring and fixed for \"less than 1 minute\".  Vitals at that time were 155/85; pulse 101/min.  Patient was on Versed drip.  Patient does not have any history of seizures.  Of note; patient was following up at [facility] neurology; patient has several year hx of tremors involving bilateral upper extremities for which she was on primidone and it was discontinued as it was ineffective and caused patient to be drowsy. Patient's dad also has hx of tremors.  Patient was on gabapentin \"for a while\" to help

## 2025-04-01 NOTE — PROGRESS NOTES
Order obtained for extubation.  SpO2 of 93 on 30% FiO2.   Patient extubated and placed on 5 liters/min via nasal cannula.   Post extubation SpO2 is 92% with HR  98 bpm and RR 20 breaths/min.    Patient had mild cough that was non-productive.  Extubation Well tolerated by patient..   Breath Sounds: Dim on the left, rhonchi on the right.    Jennifer Dozier RCP   12:43 PM

## 2025-04-02 ENCOUNTER — APPOINTMENT (OUTPATIENT)
Dept: MRI IMAGING | Age: 75
End: 2025-04-02
Payer: MEDICARE

## 2025-04-02 ENCOUNTER — APPOINTMENT (OUTPATIENT)
Dept: GENERAL RADIOLOGY | Age: 75
End: 2025-04-02
Payer: MEDICARE

## 2025-04-02 LAB
ANION GAP SERPL CALCULATED.3IONS-SCNC: 12 MMOL/L (ref 9–16)
ANION GAP SERPL CALCULATED.3IONS-SCNC: 14 MMOL/L (ref 9–16)
BASOPHILS # BLD: 0.03 K/UL (ref 0–0.2)
BASOPHILS NFR BLD: 1 % (ref 0–2)
BNP SERPL-MCNC: ABNORMAL PG/ML (ref 0–125)
BUN SERPL-MCNC: 16 MG/DL (ref 8–23)
BUN SERPL-MCNC: 16 MG/DL (ref 8–23)
CALCIUM SERPL-MCNC: 8.4 MG/DL (ref 8.8–10.2)
CALCIUM SERPL-MCNC: 8.9 MG/DL (ref 8.8–10.2)
CHLORIDE SERPL-SCNC: 101 MMOL/L (ref 98–107)
CHLORIDE SERPL-SCNC: 104 MMOL/L (ref 98–107)
CO2 SERPL-SCNC: 22 MMOL/L (ref 20–31)
CO2 SERPL-SCNC: 24 MMOL/L (ref 20–31)
CREAT SERPL-MCNC: 1 MG/DL (ref 0.5–0.9)
CREAT SERPL-MCNC: 1 MG/DL (ref 0.5–0.9)
EOSINOPHIL # BLD: 0.17 K/UL (ref 0–0.44)
EOSINOPHILS RELATIVE PERCENT: 3 % (ref 1–4)
ERYTHROCYTE [DISTWIDTH] IN BLOOD BY AUTOMATED COUNT: 13.4 % (ref 11.8–14.4)
GFR, ESTIMATED: 60 ML/MIN/1.73M2
GFR, ESTIMATED: 61 ML/MIN/1.73M2
GLUCOSE BLD-MCNC: 156 MG/DL (ref 65–105)
GLUCOSE BLD-MCNC: 161 MG/DL (ref 65–105)
GLUCOSE BLD-MCNC: 168 MG/DL (ref 65–105)
GLUCOSE BLD-MCNC: 220 MG/DL (ref 65–105)
GLUCOSE BLD-MCNC: 227 MG/DL (ref 65–105)
GLUCOSE BLD-MCNC: 236 MG/DL (ref 65–105)
GLUCOSE BLD-MCNC: 241 MG/DL (ref 65–105)
GLUCOSE BLD-MCNC: 242 MG/DL (ref 65–105)
GLUCOSE SERPL-MCNC: 166 MG/DL (ref 82–115)
GLUCOSE SERPL-MCNC: 243 MG/DL (ref 82–115)
HCT VFR BLD AUTO: 30 % (ref 36.3–47.1)
HGB BLD-MCNC: 9.9 G/DL (ref 11.9–15.1)
IMM GRANULOCYTES # BLD AUTO: 0.01 K/UL (ref 0–0.3)
IMM GRANULOCYTES NFR BLD: 0 %
LYMPHOCYTES NFR BLD: 1.34 K/UL (ref 1.1–3.7)
LYMPHOCYTES RELATIVE PERCENT: 24 % (ref 24–43)
MAGNESIUM SERPL-MCNC: 1.5 MG/DL (ref 1.6–2.4)
MAGNESIUM SERPL-MCNC: 2.1 MG/DL (ref 1.6–2.4)
MCH RBC QN AUTO: 30.8 PG (ref 25.2–33.5)
MCHC RBC AUTO-ENTMCNC: 33 G/DL (ref 28.4–34.8)
MCV RBC AUTO: 93.5 FL (ref 82.6–102.9)
MONOCYTES NFR BLD: 0.53 K/UL (ref 0.1–1.2)
MONOCYTES NFR BLD: 9 % (ref 3–12)
NEUTROPHILS NFR BLD: 63 % (ref 36–65)
NEUTS SEG NFR BLD: 3.63 K/UL (ref 1.5–8.1)
NRBC BLD-RTO: 0 PER 100 WBC
PLATELET # BLD AUTO: 101 K/UL (ref 138–453)
PMV BLD AUTO: 10.6 FL (ref 8.1–13.5)
POTASSIUM SERPL-SCNC: 3.5 MMOL/L (ref 3.7–5.3)
POTASSIUM SERPL-SCNC: 3.7 MMOL/L (ref 3.7–5.3)
RBC # BLD AUTO: 3.21 M/UL (ref 3.95–5.11)
SODIUM SERPL-SCNC: 137 MMOL/L (ref 136–145)
SODIUM SERPL-SCNC: 139 MMOL/L (ref 136–145)
WBC OTHER # BLD: 5.7 K/UL (ref 3.5–11.3)

## 2025-04-02 PROCEDURE — 2700000000 HC OXYGEN THERAPY PER DAY

## 2025-04-02 PROCEDURE — 2580000003 HC RX 258: Performed by: INTERNAL MEDICINE

## 2025-04-02 PROCEDURE — 99232 SBSQ HOSP IP/OBS MODERATE 35: CPT | Performed by: PSYCHIATRY & NEUROLOGY

## 2025-04-02 PROCEDURE — 85025 COMPLETE CBC W/AUTO DIFF WBC: CPT

## 2025-04-02 PROCEDURE — 2500000003 HC RX 250 WO HCPCS: Performed by: INTERNAL MEDICINE

## 2025-04-02 PROCEDURE — 83735 ASSAY OF MAGNESIUM: CPT

## 2025-04-02 PROCEDURE — 6360000002 HC RX W HCPCS: Performed by: INTERNAL MEDICINE

## 2025-04-02 PROCEDURE — 94640 AIRWAY INHALATION TREATMENT: CPT

## 2025-04-02 PROCEDURE — 6360000004 HC RX CONTRAST MEDICATION: Performed by: PSYCHIATRY & NEUROLOGY

## 2025-04-02 PROCEDURE — 97530 THERAPEUTIC ACTIVITIES: CPT

## 2025-04-02 PROCEDURE — 99233 SBSQ HOSP IP/OBS HIGH 50: CPT | Performed by: NURSE PRACTITIONER

## 2025-04-02 PROCEDURE — 6370000000 HC RX 637 (ALT 250 FOR IP): Performed by: INTERNAL MEDICINE

## 2025-04-02 PROCEDURE — 97163 PT EVAL HIGH COMPLEX 45 MIN: CPT

## 2025-04-02 PROCEDURE — 99232 SBSQ HOSP IP/OBS MODERATE 35: CPT | Performed by: STUDENT IN AN ORGANIZED HEALTH CARE EDUCATION/TRAINING PROGRAM

## 2025-04-02 PROCEDURE — 83880 ASSAY OF NATRIURETIC PEPTIDE: CPT

## 2025-04-02 PROCEDURE — 6360000002 HC RX W HCPCS

## 2025-04-02 PROCEDURE — 97110 THERAPEUTIC EXERCISES: CPT

## 2025-04-02 PROCEDURE — 71045 X-RAY EXAM CHEST 1 VIEW: CPT

## 2025-04-02 PROCEDURE — 70553 MRI BRAIN STEM W/O & W/DYE: CPT

## 2025-04-02 PROCEDURE — 97535 SELF CARE MNGMENT TRAINING: CPT

## 2025-04-02 PROCEDURE — 36415 COLL VENOUS BLD VENIPUNCTURE: CPT

## 2025-04-02 PROCEDURE — 94761 N-INVAS EAR/PLS OXIMETRY MLT: CPT

## 2025-04-02 PROCEDURE — 2000000000 HC ICU R&B

## 2025-04-02 PROCEDURE — 6360000002 HC RX W HCPCS: Performed by: STUDENT IN AN ORGANIZED HEALTH CARE EDUCATION/TRAINING PROGRAM

## 2025-04-02 PROCEDURE — A9579 GAD-BASE MR CONTRAST NOS,1ML: HCPCS | Performed by: PSYCHIATRY & NEUROLOGY

## 2025-04-02 PROCEDURE — 80048 BASIC METABOLIC PNL TOTAL CA: CPT

## 2025-04-02 PROCEDURE — 6360000002 HC RX W HCPCS: Performed by: NURSE PRACTITIONER

## 2025-04-02 PROCEDURE — 97167 OT EVAL HIGH COMPLEX 60 MIN: CPT

## 2025-04-02 PROCEDURE — 6370000000 HC RX 637 (ALT 250 FOR IP): Performed by: STUDENT IN AN ORGANIZED HEALTH CARE EDUCATION/TRAINING PROGRAM

## 2025-04-02 RX ORDER — LACTULOSE 10 G/15ML
20 SOLUTION ORAL 2 TIMES DAILY
Status: DISCONTINUED | OUTPATIENT
Start: 2025-04-02 | End: 2025-04-03

## 2025-04-02 RX ORDER — FUROSEMIDE 10 MG/ML
60 INJECTION INTRAMUSCULAR; INTRAVENOUS ONCE
Status: COMPLETED | OUTPATIENT
Start: 2025-04-02 | End: 2025-04-02

## 2025-04-02 RX ORDER — INSULIN LISPRO 100 [IU]/ML
0-8 INJECTION, SOLUTION INTRAVENOUS; SUBCUTANEOUS
Status: DISCONTINUED | OUTPATIENT
Start: 2025-04-02 | End: 2025-04-05 | Stop reason: HOSPADM

## 2025-04-02 RX ORDER — POTASSIUM CHLORIDE 7.45 MG/ML
10 INJECTION INTRAVENOUS PRN
Status: DISCONTINUED | OUTPATIENT
Start: 2025-04-02 | End: 2025-04-05

## 2025-04-02 RX ORDER — POTASSIUM CHLORIDE 1500 MG/1
40 TABLET, EXTENDED RELEASE ORAL PRN
Status: DISCONTINUED | OUTPATIENT
Start: 2025-04-02 | End: 2025-04-02

## 2025-04-02 RX ORDER — MAGNESIUM SULFATE IN WATER 40 MG/ML
2000 INJECTION, SOLUTION INTRAVENOUS ONCE
Status: COMPLETED | OUTPATIENT
Start: 2025-04-02 | End: 2025-04-02

## 2025-04-02 RX ORDER — SENNA AND DOCUSATE SODIUM 50; 8.6 MG/1; MG/1
2 TABLET, FILM COATED ORAL 2 TIMES DAILY PRN
Status: DISCONTINUED | OUTPATIENT
Start: 2025-04-02 | End: 2025-04-05 | Stop reason: HOSPADM

## 2025-04-02 RX ORDER — POTASSIUM CHLORIDE 7.45 MG/ML
10 INJECTION INTRAVENOUS PRN
Status: DISCONTINUED | OUTPATIENT
Start: 2025-04-02 | End: 2025-04-02

## 2025-04-02 RX ORDER — FUROSEMIDE 10 MG/ML
40 INJECTION INTRAMUSCULAR; INTRAVENOUS 2 TIMES DAILY
Status: DISCONTINUED | OUTPATIENT
Start: 2025-04-02 | End: 2025-04-03

## 2025-04-02 RX ORDER — MAGNESIUM SULFATE IN WATER 40 MG/ML
2000 INJECTION, SOLUTION INTRAVENOUS PRN
Status: DISCONTINUED | OUTPATIENT
Start: 2025-04-02 | End: 2025-04-05 | Stop reason: HOSPADM

## 2025-04-02 RX ORDER — ATORVASTATIN CALCIUM 40 MG/1
40 TABLET, FILM COATED ORAL DAILY
Status: DISCONTINUED | OUTPATIENT
Start: 2025-04-02 | End: 2025-04-05 | Stop reason: HOSPADM

## 2025-04-02 RX ORDER — TRAZODONE HYDROCHLORIDE 100 MG/1
100 TABLET ORAL NIGHTLY
Status: DISCONTINUED | OUTPATIENT
Start: 2025-04-02 | End: 2025-04-03

## 2025-04-02 RX ORDER — LEVOTHYROXINE SODIUM 75 UG/1
75 TABLET ORAL DAILY
Status: DISCONTINUED | OUTPATIENT
Start: 2025-04-02 | End: 2025-04-05 | Stop reason: HOSPADM

## 2025-04-02 RX ADMIN — INSULIN LISPRO 2 UNITS: 100 INJECTION, SOLUTION INTRAVENOUS; SUBCUTANEOUS at 06:15

## 2025-04-02 RX ADMIN — GADOTERIDOL 18 ML: 279.3 INJECTION, SOLUTION INTRAVENOUS at 17:10

## 2025-04-02 RX ADMIN — IPRATROPIUM BROMIDE AND ALBUTEROL SULFATE 1 DOSE: .5; 2.5 SOLUTION RESPIRATORY (INHALATION) at 15:21

## 2025-04-02 RX ADMIN — POTASSIUM CHLORIDE 10 MEQ: 7.46 INJECTION, SOLUTION INTRAVENOUS at 10:14

## 2025-04-02 RX ADMIN — SODIUM CHLORIDE, PRESERVATIVE FREE 10 ML: 5 INJECTION INTRAVENOUS at 21:07

## 2025-04-02 RX ADMIN — MAGNESIUM SULFATE HEPTAHYDRATE 2000 MG: 40 INJECTION, SOLUTION INTRAVENOUS at 07:35

## 2025-04-02 RX ADMIN — HEPARIN SODIUM 5000 UNITS: 5000 INJECTION INTRAVENOUS; SUBCUTANEOUS at 21:05

## 2025-04-02 RX ADMIN — ATORVASTATIN CALCIUM 40 MG: 40 TABLET, FILM COATED ORAL at 15:15

## 2025-04-02 RX ADMIN — SODIUM CHLORIDE: 0.9 INJECTION, SOLUTION INTRAVENOUS at 03:42

## 2025-04-02 RX ADMIN — POTASSIUM CHLORIDE 10 MEQ: 7.46 INJECTION, SOLUTION INTRAVENOUS at 05:45

## 2025-04-02 RX ADMIN — IPRATROPIUM BROMIDE AND ALBUTEROL SULFATE 1 DOSE: .5; 2.5 SOLUTION RESPIRATORY (INHALATION) at 11:26

## 2025-04-02 RX ADMIN — POTASSIUM CHLORIDE 10 MEQ: 7.46 INJECTION, SOLUTION INTRAVENOUS at 11:27

## 2025-04-02 RX ADMIN — LEVOTHYROXINE SODIUM 75 MCG: 0.07 TABLET ORAL at 15:15

## 2025-04-02 RX ADMIN — HEPARIN SODIUM 5000 UNITS: 5000 INJECTION INTRAVENOUS; SUBCUTANEOUS at 06:15

## 2025-04-02 RX ADMIN — DEXMEDETOMIDINE 0.6 MCG/KG/HR: 100 INJECTION, SOLUTION INTRAVENOUS at 10:52

## 2025-04-02 RX ADMIN — Medication 12.5 MG: at 15:14

## 2025-04-02 RX ADMIN — SODIUM CHLORIDE, PRESERVATIVE FREE 10 ML: 5 INJECTION INTRAVENOUS at 08:33

## 2025-04-02 RX ADMIN — MAGNESIUM SULFATE HEPTAHYDRATE 2000 MG: 40 INJECTION, SOLUTION INTRAVENOUS at 09:42

## 2025-04-02 RX ADMIN — IPRATROPIUM BROMIDE AND ALBUTEROL SULFATE 1 DOSE: .5; 2.5 SOLUTION RESPIRATORY (INHALATION) at 06:13

## 2025-04-02 RX ADMIN — INSULIN LISPRO 2 UNITS: 100 INJECTION, SOLUTION INTRAVENOUS; SUBCUTANEOUS at 12:27

## 2025-04-02 RX ADMIN — FUROSEMIDE 60 MG: 10 INJECTION, SOLUTION INTRAMUSCULAR; INTRAVENOUS at 08:31

## 2025-04-02 RX ADMIN — HEPARIN SODIUM 5000 UNITS: 5000 INJECTION INTRAVENOUS; SUBCUTANEOUS at 15:19

## 2025-04-02 RX ADMIN — Medication 12.5 MG: at 21:05

## 2025-04-02 RX ADMIN — POTASSIUM CHLORIDE 10 MEQ: 7.46 INJECTION, SOLUTION INTRAVENOUS at 12:37

## 2025-04-02 RX ADMIN — SODIUM CHLORIDE 40 MG: 9 INJECTION, SOLUTION INTRAMUSCULAR; INTRAVENOUS; SUBCUTANEOUS at 08:32

## 2025-04-02 RX ADMIN — DEXMEDETOMIDINE 1.2 MCG/KG/HR: 100 INJECTION, SOLUTION INTRAVENOUS at 00:18

## 2025-04-02 RX ADMIN — TRAZODONE HYDROCHLORIDE 100 MG: 100 TABLET ORAL at 21:05

## 2025-04-02 RX ADMIN — INSULIN LISPRO 2 UNITS: 100 INJECTION, SOLUTION INTRAVENOUS; SUBCUTANEOUS at 00:29

## 2025-04-02 RX ADMIN — WATER 2000 MG: 1 INJECTION INTRAMUSCULAR; INTRAVENOUS; SUBCUTANEOUS at 21:05

## 2025-04-02 RX ADMIN — IPRATROPIUM BROMIDE AND ALBUTEROL SULFATE 1 DOSE: .5; 2.5 SOLUTION RESPIRATORY (INHALATION) at 20:06

## 2025-04-02 ASSESSMENT — PAIN SCALES - GENERAL
PAINLEVEL_OUTOF10: 0

## 2025-04-02 NOTE — FLOWSHEET NOTE
04/01/25 2142   Treatment Team Notification   Reason for Communication Evaluate   Name of Team Member Notified jose nolasco   Treatment Team Role Advanced Practice Nurse   Method of Communication Secure Message   Response See orders     Pt still NPO and not able to eat or drink    Lantus held per NP; follow Humalog sliding scale.

## 2025-04-02 NOTE — PROGRESS NOTES
Physical Therapy  Facility/Department: [facility]   Physical Therapy Initial Evaluation    Patient Name: Aracelis Jimenez        MRN: [id number]    : 1950    Date of Service: 2025  BERENICE Montes reports patient is medically stable for therapy treatment this date.    Chart reviewed prior to treatment and patient is agreeable for therapy.  All lines intact and patient positioned comfortably at end of treatment.  All patient needs addressed prior to ending therapy session.      Chief Complaint   Patient presents with    Altered Mental Status     Past Medical History:  has a past medical history of Depression, Diabetes mellitus (HCC), Hypertension, Stroke (HCC), and Tremors of nervous system.  Past Surgical History:  has a past surgical history that includes Hysterectomy; Cholecystectomy; and Rib fracture surgery ().    Discharge Recommendations  Discharge Recommendations: Patient would benefit from continued therapy after discharge  Pt currently functioning below baseline.  Recommend daily inpatient skilled therapy at time of discharge to maximize long term outcomes and prevent re-admission. Please refer to AM-PAC score for current level of function.    Assessment  Body Structures, Functions, Activity Limitations Requiring Skilled Therapeutic Intervention: Decreased functional mobility , Decreased ADL status, Decreased strength, Decreased safe awareness, Decreased endurance, Decreased balance, Decreased high-level IADLs, Decreased coordination, Decreased posture, Decreased ROM, Decreased cognition  Assessment: Pt tolerated PT eval poor.  Activity significantly challenged by increased lethargy this date.  Pt also presenting w/ global weakness & deconditioning; pt intubated 25 - 25. Pt currently presenting w/ deficits in strength, ROM, seated balance, endurance, posture, and mobility.  At this time pt requires skilled 2 person assist for safe bed mobility and is functioning below baseline.  Pt would

## 2025-04-02 NOTE — PLAN OF CARE
Problem: Respiratory - Adult  Goal: Achieves optimal ventilation and oxygenation  4/1/2025 2142 by Rylan French RCP  Outcome: Progressing     Problem: Respiratory - Adult  Goal: Able to breathe comfortably  Outcome: Progressing     Problem: Respiratory - Adult  Goal: Adequate oxygenation  Description: Adequate oxygenation  Outcome: Progressing     Problem: Respiratory - Adult  Goal: Will be able to breathe spontaneously, without ventilator support  Description: Will be able to breathe spontaneously, without ventilator support  Outcome: Progressing     Problem: Respiratory - Adult  Goal: Adequate oxygenation  Description: Adequate oxygenation  Outcome: Progressing     Problem: Gastrointestinal - Adult  Goal: Maintains adequate nutritional intake  4/1/2025 0940 by Pamella Mott, RN  Outcome: Not Progressing  Flowsheets (Taken 3/31/2025 2000 by Leoncio Orlando, RN)  Maintains adequate nutritional intake:   Monitor percentage of each meal consumed   Identify factors contributing to decreased intake, treat as appropriate   Assist with meals as needed   Monitor intake and output, weight and lab values   Obtain nutritional consult as needed

## 2025-04-02 NOTE — PROGRESS NOTES
[facility]  Office: [phone]  Ulises Yoo DO, Glenn Patterson DO, Fernando Wilson DO, Reji Gagnon DO, Radha Grayson MD, Shanell Denney MD, Juanis Zaragoza MD, Carmela Jennings MD,  Kali Harp MD, Mitra Willson MD, Alex Adair MD,  Pamela Hodge DO, Ar Pizano MD, Jaylen Sepulveda MD, Raoul Yoo DO, Katina Deras MD,  Lawrence Davila DO, Belinda Whalen MD, Crystal Rai MD, Lance Spence MD,  Wilson Granger MD, Dom Rogers MD, Bhavana Wyatt MD, Kylah Richmond MD, Humphrey Urñea MD, Guille Garcia MD, Pedro Hernandez DO, Yasir Parker MD, Pamela Patterson MD, Mohsin Reza, MD, Carlie Cohen MD, Shirley Waterhouse, CNP,  Sridevi Muñoz, CNP, Pedro Kuhn, CNP,  Maria G Norman, DNP, Edelmira Oviedo, CNP, Niki Hernandez, CNP, Penelope Reyes, CNP, Shiela Oropeza, CNP, Doris Tolliver, PA-C, Екатерина Thomas, CNP, Nena Baltazar, CNP,  Iliana Rivero, CNP, Simona Foley, CNP, Ramin Villegas, PA-C, Jimena Castillo, CNP,  Veronica Hogue, CNS, Miladys Matamoros, CNP, Kirsty Blanco, CNP,   Rosaura Wolff, CNP         [facility]   IN-PATIENT SERVICE   [facility]    Progress Note    4/2/2025    9:39 AM    Name:   Aracelis Jimenez  MRN:     [id number]     Acct:      [id number]   Room:   2027/2027-01  IP Day:  5  Admit Date:  3/28/2025  2:47 AM    PCP:   Francisca Daniels MD  Code Status:  Full Code    Subjective:     C/C:   Chief Complaint   Patient presents with    Altered Mental Status     Interval History Status: improved.     Patient seen examined at bedside.  Drowsy, weaning down on precedex. Daughter is also at bedside. Has been doing well extubated but required precedex overnight due to agiation.     Brief History:     74-year-old female past medical history of hypertension, diabetes type 2, hypothyroidism, CKD stage IIIa, depression, prior history of stroke (presented to the ER due to having altered mental status and having abdominal pain.  Patient was intubated in the  **OR** dextrose bolus, glucagon (rDNA), dextrose, sodium chloride flush, sodium chloride, acetaminophen **OR** acetaminophen, sodium chloride flush, sodium chloride    Data:     Past Medical History:   has a past medical history of Depression, Diabetes mellitus (HCC), Hypertension, Stroke (HCC), and Tremors of nervous system.    Social History:   reports that she has never smoked. She has never used smokeless tobacco. She reports that she does not drink alcohol and does not use drugs.     Family History:   Family History   Problem Relation Age of Onset    Kidney Disease Mother         only had one kidney       Vitals:  BP (!) 165/58   Pulse 74   Temp 97.1 °F (36.2 °C) (Temporal)   Resp 26   Ht 1.575 m (5' 2\")   Wt 89 kg (196 lb 4.8 oz)   SpO2 97%   BMI 35.90 kg/m²   Temp (24hrs), Av.9 °F (36.6 °C), Min:97 °F (36.1 °C), Max:99.6 °F (37.6 °C)    Recent Labs     25  2359 25  0352 25  0610 25  0808   POCGLU 242* 227* 241* 236*       I/O (24Hr):    Intake/Output Summary (Last 24 hours) at 2025 0939  Last data filed at 2025 0922  Gross per 24 hour   Intake 1522.55 ml   Output 2567 ml   Net -1044.45 ml       Labs:  Hematology:  Recent Labs     25  0723 25  0337 25  0303   WBC 5.1 5.4 5.7   RBC 3.83* 3.84* 3.21*   HGB 11.9 11.9 9.9*   HCT 34.2* 36.1* 30.0*   MCV 89.3 94.0 93.5   MCH 31.1 31.0 30.8   MCHC 34.8 33.0 33.0   RDW 13.2 13.3 13.4   PLT 89* 99* 101*   MPV 10.2 10.3 10.6     Chemistry:  Recent Labs     25  0723 25  0337 25  0303    136 137   K 3.7 4.0 3.5*    105 104   CO2 22 19* 22   GLUCOSE 268* 201* 243*   BUN 23 19 16   CREATININE 1.2* 0.9 1.0*   MG  --   --  1.5*   ANIONGAP 12 12 12   LABGLOM 50* 64 60*   CALCIUM 8.6* 8.6* 8.4*   PROBNP  --   --  10,093*     Recent Labs     25  0337 25  0634 25  1548 25  2031 25  2359 25  0352 25  0610 25  0808   TSH 13.60*  --   --   --    Contrast? None  Result Date: 3/28/2025  1. Dependent opacities in the lung bases compatible with subsegmental atelectasis versus consolidation. 2. Enteric tube within the stomach. The stomach is distended with gas and ingested material. 3. Mild stool burden throughout the colon.     CT Head W/O Contrast  Result Date: 3/28/2025  No acute intracranial abnormality.       Physical Examination:        Physical Exam  Constitutional:       Appearance: She is obese. She is ill-appearing. She is not diaphoretic.      Comments: Drowsy, follows commands, doesn't like opening her eyes   Cardiovascular:      Rate and Rhythm: Regular rhythm. Tachycardia present.      Heart sounds: No murmur heard.  Pulmonary:      Comments: Crackles bilaterally  Abdominal:      General: There is no distension.      Palpations: Abdomen is soft.      Tenderness: There is no abdominal tenderness.   Musculoskeletal:      Cervical back: Neck supple.      Right lower leg: Edema present.      Left lower leg: Edema present.   Skin:     General: Skin is dry.      Capillary Refill: Capillary refill takes 2 to 3 seconds.      Coloration: Skin is pale.   Neurological:      Mental Status: She is disoriented.         Assessment:        Hospital Problems           Last Modified POA    * (Principal) Respiratory failure requiring intubation 3/28/2025 Yes    Acute kidney injury 3/28/2025 Yes    Primary hypertension 3/28/2025 Yes    Type 2 diabetes mellitus with hyperglycemia, with long-term current use of insulin (HCC) 3/28/2025 Yes    Hypothyroidism 3/28/2025 Yes    CKD stage 3 due to type 2 diabetes mellitus (HCC) 3/28/2025 Yes    Symptomatic bradycardia 4/1/2025 Yes    Seizure-like activity (HCC) 4/1/2025 Yes    Hyperkalemia 4/1/2025 Yes    Action tremor 4/1/2025 Yes       Plan:        Severe vagal episode causing symptomatic bradycardia with complication of prior beta-blocker usage and ALPHONSE suspected secondary to poor oral intake.  Appreciate cardiology

## 2025-04-02 NOTE — PROGRESS NOTES
Occupational Therapy  Occupational Therapy Initial Evaluation  Facility/Department: [facility]   Patient Name: Aracelis Jimenez        MRN: [id number]    : 1950    Date of Service: 2025    BERENICE Navarro reports patient is medically stable for therapy treatment this date.    Chart reviewed prior to treatment and patient is agreeable for therapy.  All lines intact and patient positioned comfortably at end of treatment.  All patient needs addressed prior to ending therapy session.      Discharge Recommendations  Discharge Recommendations: Patient would benefit from continued therapy after discharge  Pt currently functioning below baseline.  Recommend daily inpatient skilled therapy at time of discharge to maximize long term outcomes and prevent re-admission. Please refer to AM-PAC score for current level of function.    OT Equipment Recommendations  Equipment Needed: Yes  Mobility Devices: ADL Assistive Devices  ADL Assistive Devices: Emergency Alert System, Long-handled Shoe Horn, Long-handled Sponge, Reacher, Sock-Aid Hard    Chief Complaint   Patient presents with    Altered Mental Status     PER H&P: Patient presented to the emergency room secondary to altered mental status. EMS was called secondary to abdominal pain. Patient was sitting on the toilet and having difficulty having a bowel movement. On arrival by EMS patient was found to be bradycardic and hypotensive. Patient had been given atropine without getting frequent improvement in her heart rate and blood pressure. Patient was then given Versed by EMS and transcutaneous pacing was started. On arrival to the emergency room patient's mental status is decreased and patient is not able to protect her airway. Patient was therefore intubated.     Intubated: 3/28/25  Extubated: 25      Past Medical History:  has a past medical history of Depression, Diabetes mellitus (HCC), Hypertension, Stroke (HCC), and Tremors of nervous system.  Past Surgical  History:  has a past surgical history that includes Hysterectomy; Cholecystectomy; and Rib fracture surgery (2022).    Assessment  Performance deficits / Impairments: Decreased functional mobility ;Decreased ADL status;Decreased safe awareness;Decreased balance;Decreased posture;Decreased high-level IADLs;Decreased endurance;Decreased strength;Decreased cognition;Decreased ROM;Decreased fine motor control;Decreased coordination  Assessment: Pt tolerated OT eval fair. Pt able to sit on EOB unable to progress mobility d/t lethargy. Skilled OT services are indicated to increase I and safety during functional tasks to return home at [facility] as able.  Decision Making: Medium Complexity  Activity Tolerance  Activity Tolerance: Patient Tolerated treatment well  Safety Devices  Type of Devices: Bed alarm in place;Call light within reach;Left in bed;Nurse notified;All aidan prominences offloaded;Sitter present  Restraints  Restraints Initially in Place: No      Restrictions/Precautions  Restrictions/Precautions  Restrictions/Precautions: General Precautions;Fall Risk;Contact Precautions  Activity Level:  (Activity as tolerated)  Required Braces or Orthoses?: No  Position Activity Restriction  Other Position/Activity Restrictions: BUE IV; jones cath, 1:1 sitter         Subjective  General  Chart Reviewed: Yes  Patient assessed for rehabilitation services?: Yes  Family / Caregiver Present: Yes (Pts son and daughter in room during session)  Subjective  Subjective: Pt in bed lethargic but arousable with Max stimulation. Pt minimally verbal throughout session, garbled speech at times, difficult to understand.            BUE Assessment  Gross Assessment  AROM: Generally decreased, functional (unable to formally assess however ROM appeared WFL in function as pt reaching for lines)  Strength: Generally decreased, functional (unable to formally assess d/t cognition)  Coordination: Generally decreased, functional  Tone:

## 2025-04-02 NOTE — FLOWSHEET NOTE
04/02/25 0418   Treatment Team Notification   Reason for Communication Evaluate   Name of Team Member Notified jose nolasco   Treatment Team Role Advanced Practice Nurse   Method of Communication Secure Message   Response Waiting for response     Heparin sq to give at 0600, has been held d/t low platelets. Platelet level 101, okay to give per NP. Potassium 3.5, replacement orders in.

## 2025-04-02 NOTE — PLAN OF CARE
Problem: Safety - Adult  Goal: Free from fall injury  Outcome: Progressing     Problem: Discharge Planning  Goal: Discharge to home or other facility with appropriate resources  Outcome: Progressing     Problem: Skin/Tissue Integrity  Goal: Skin integrity remains intact  Description: 1.  Monitor for areas of redness and/or skin breakdown  2.  Assess vascular access sites hourly  3.  Every 4-6 hours minimum:  Change oxygen saturation probe site  4.  Every 4-6 hours:  If on nasal continuous positive airway pressure, respiratory therapy assess nares and determine need for appliance change or resting period  Outcome: Progressing     Problem: ABCDS Injury Assessment  Goal: Absence of physical injury  Outcome: Progressing     Problem: Neurosensory - Adult  Goal: Achieves stable or improved neurological status  Outcome: Progressing     Problem: Respiratory - Adult  Goal: Achieves optimal ventilation and oxygenation  Outcome: Progressing     Problem: Cardiovascular - Adult  Goal: Maintains optimal cardiac output and hemodynamic stability  Outcome: Progressing  Goal: Absence of cardiac dysrhythmias or at baseline  Outcome: Progressing     Problem: Musculoskeletal - Adult  Goal: Return mobility to safest level of function  Outcome: Progressing     Problem: Gastrointestinal - Adult  Goal: Maintains adequate nutritional intake  Outcome: Progressing     Problem: Genitourinary - Adult  Goal: Urinary catheter remains patent  Outcome: Progressing     Problem: Metabolic/Fluid and Electrolytes - Adult  Goal: Electrolytes maintained within normal limits  Outcome: Progressing  Goal: Hemodynamic stability and optimal renal function maintained  Outcome: Progressing  Goal: Glucose maintained within prescribed range  Outcome: Progressing     Problem: Pain  Goal: Verbalizes/displays adequate comfort level or baseline comfort level  Outcome: Progressing     Problem: Nutrition Deficit:  Goal: Optimize nutritional status  Outcome:  Progressing     Problem: Confusion  Goal: Confusion, delirium, dementia, or psychosis is improved or at baseline  Description: INTERVENTIONS:  1. Assess for possible contributors to thought disturbance, including medications, impaired vision or hearing, underlying metabolic abnormalities, dehydration, psychiatric diagnoses, and notify attending LIP  2. [place] high risk fall precautions, as indicated  3. Provide frequent short contacts to provide reality reorientation, refocusing and direction  4. Decrease environmental stimuli, including noise as appropriate  5. Monitor and intervene to maintain adequate nutrition, hydration, elimination, sleep and activity  6. If unable to ensure safety without constant attention obtain sitter and review sitter guidelines with assigned personnel  7. Initiate Psychosocial CNS and Spiritual Care consult, as indicated  Outcome: Progressing

## 2025-04-02 NOTE — PROGRESS NOTES
End Of Shift Note  [facility]  Summary of shift: Pt started the shift replacing electrolytes. 4g of Magnesium and 40mEq's of potassium given. Repeat BNP is still pending. Brain MRI also completed but still awaiting results. EEG will be completed tomorrow AM. 60mg of lasix given, pt had 2722ml out today total. Night time Lasix dose was held per Dr. Bills. Pt up to chair via fritz steady for about an hour. Pt alert to self and time. Diet has been advanced as tolerated which has been small sips of water and pills crushed in chocolate pudding.     Vitals:    Vitals:    04/02/25 1533 04/02/25 1600 04/02/25 1720 04/02/25 1800   BP: (!) 165/69  (!) 123/57 97/66   Pulse: (!) 118 (!) 111 (!) 109 (!) 109   Resp: 18  22 19   Temp: 98.7 °F (37.1 °C)  98.3 °F (36.8 °C)    TempSrc: Temporal  Oral    SpO2:   93% 94%   Weight:       Height:            I&O:   Intake/Output Summary (Last 24 hours) at 4/2/2025 1840  Last data filed at 4/2/2025 1815  Gross per 24 hour   Intake 1369.43 ml   Output 3927 ml   Net -2557.57 ml       Resp Status: 4L NC        Critical Care IV infusions:   dexmedeTOMIDine (PRECEDEX) 1,000 mcg in sodium chloride 0.9 % 250 mL infusion 0.2 mcg/kg/hr (04/02/25 1227)    dextrose      DOPamine Stopped (03/31/25 1233)    sodium chloride      sodium chloride          LDA:   Peripheral IV 04/01/25 Distal;Left;Ventral Cephalic (Active)   Number of days: 1       Urinary Catheter 03/28/25 (Active)   Number of days: 5

## 2025-04-02 NOTE — PROGRESS NOTES
Pulmonary Critical Care Progress Note    Patient seen for the follow up of Respiratory failure requiring intubation     Subjective:    She was extubated now requires oxygen at 3 L.  I ordered Lasix after extubation I was contacted by RN started Precedex drip for agitation kept her n.p.o. advise Geodon in case of severe agitation.  She is now  following commands alert to self.  She is NPO.        Examination:    Vitals: BP (!) 146/84   Pulse 100   Temp 97.5 °F (36.4 °C) (Temporal)   Resp 19   Ht 1.575 m (5' 2\")   Wt 89 kg (196 lb 4.8 oz)   SpO2 91%   BMI 35.90 kg/m²   SpO2  Av.3 %  Min: 88 %  Max: 98 %  General appearance: Awake in no acute distress sleepy  Neck: No JVD  Lungs: Decreased breath sound no crackles or wheezing  Heart: regular rate and rhythm, S1, S2 normal, no gallop  Abdomen: Soft, non tender, + BS  Extremities: no cyanosis or clubbing. No significant edema    LABs:    CBC:   Recent Labs     25  0337 25  0303   WBC 5.4 5.7   HGB 11.9 9.9*   HCT 36.1* 30.0*   PLT 99* 101*     BMP:   Recent Labs     25  0337 25  0303    137   K 4.0 3.5*   CO2 19* 22   BUN 19 16   CREATININE 0.9 1.0*   LABGLOM 64 60*   GLUCOSE 201* 243*      Latest Reference Range & Units 25 08:27   POC HCO3 21.0 - 28.0 mmol/L 21.5   POC O2 SAT 94.0 - 98.0 % 92.9 (L)   POC pCO2 35.0 - 48.0 mm Hg 42.9   POC pH 7.350 - 7.450  7.308 (L)   POC PO2 83.0 - 108.0 mm Hg 73.0 (L)   (L): Data is abnormally low   Latest Reference Range & Units 25 05:45 25 08:51 25 03:37   Procalcitonin 0.00 - 0.09 ng/mL 0.58 (H) 0.32 (H) 0.25 (H)   (H): Data is abnormally high    Radiology:  Chest x-ray     Removal of ET tube and enteric tube.     Perihilar airspace disease suggesting central pulmonary edema. Small right  pleural effusion.         Chest x-ray 3/31 reviewed  1. Endotracheal tube tip is approximately 2.9 cm above the mac.  2. Low lung volumes with bibasilar opacities, likely  atelectasis.     Impression/recommendations;    Acute hypoxic respiratory failure  Oxygen by nasal cannula  Incentive spirometry every hour  Precedex for agitation  Geodon IM for severe agitation at 10 mg IM daily  Video swallow by speech       Bradycardia ?  Related to beta-blocker/sick sinus syndrome status post transvenous pacemaker/pulmonary edema  Cardiology on consult/check echocardiogram  Monitor heart rate   Off beta-blockers  Start Lasix 40 IV twice daily       Pneumonia?  Aspiration/atelectasis   Rocephin off cefepime concern of causing mental status issues       hyperkalemia/ ALPHONSE on CKD/hyponatremia  Nephrology on consult     Hyperglycemia with diabetes/hypothyroidism  Monitor blood sugar/insulin scale     History of stroke/peripheral vascular disease  Neurology on consult/MRI planned    Obesity suspect sleep apnea  Outpatient sleep evaluation     discussed with RN  Discussed with family at bedside  Peptic ulcer disease prophylaxis  DVT prophylaxis         Prabhu Bills MD, MD, [facility]  Pulmonary Critical Care and Sleep Medicine,  [facility]  Cell: [phone]  Office: [phone]

## 2025-04-02 NOTE — PLAN OF CARE
Problem: Safety - Medical Restraint  Goal: Remains free of injury from restraints (Restraint for Interference with Medical Device)  Description: INTERVENTIONS:  1. Determine that other, less restrictive measures have been tried or would not be effective before applying the restraint  2. Evaluate the patient's condition at the time of restraint application  3. Inform patient/family regarding the reason for restraint  4. Q2H: Monitor safety, psychosocial status, comfort, nutrition and hydration  4/2/2025 0053 by Penelope Delgado RN  Outcome: Progressing  4/2/2025 0052 by Penelope Delgado RN  Outcome: Progressing     Problem: Safety - Adult  Goal: Free from fall injury  4/2/2025 0053 by Penelope Delgado RN  Outcome: Progressing  4/2/2025 0052 by Penelope Delgado RN  Outcome: Progressing  Flowsheets (Taken 4/1/2025 2000)  Free From Fall Injury:   Instruct family/caregiver on patient safety   Based on caregiver fall risk screen, instruct family/caregiver to ask for assistance with transferring infant if caregiver noted to have fall risk factors     Problem: Discharge Planning  Goal: Discharge to home or other facility with appropriate resources  4/2/2025 0053 by Penelope Delgado RN  Outcome: Progressing  4/2/2025 0052 by Penelope Delgado RN  Outcome: Progressing  Flowsheets (Taken 4/1/2025 2000)  Discharge to home or other facility with appropriate resources:   Identify barriers to discharge with patient and caregiver   Identify discharge learning needs (meds, wound care, etc)   Arrange for needed discharge resources and transportation as appropriate   Refer to discharge planning if patient needs post-hospital services based on physician order or complex needs related to functional status, cognitive ability or social support system   Arrange for interpreters to assist at discharge as needed     Problem: Skin/Tissue Integrity  Goal: Skin integrity remains intact  Description: 1.  Monitor for areas of redness and/or skin breakdown  2.   Administer vasoactive medications as ordered   For PPHN infants, administer sedation as ordered and minimize all controllable stressors.  Goal: Absence of cardiac dysrhythmias or at baseline  4/2/2025 0053 by Penelope Delgado RN  Outcome: Progressing  4/2/2025 0052 by Penelope Delgado RN  Outcome: Progressing  Flowsheets (Taken 4/1/2025 2000)  Absence of cardiac dysrhythmias or at baseline:   Monitor cardiac rate and rhythm   Assess for signs of decreased cardiac output   Administer antiarrhythmia medication and electrolyte replacement as ordered     Problem: Musculoskeletal - Adult  Goal: Return mobility to safest level of function  4/2/2025 0053 by Penelope Delgado RN  Outcome: Progressing  4/2/2025 0052 by Penelope Delgado RN  Outcome: Progressing  Flowsheets (Taken 4/1/2025 2000)  Return Mobility to Safest Level of Function:   Assess patient stability and activity tolerance for standing, transferring and ambulating with or without assistive devices   Assist with transfers and ambulation using safe patient handling equipment as needed   Ensure adequate protection for wounds/incisions during mobilization   Obtain physical therapy/occupational therapy consults as needed   Apply continuous passive motion per provider or physical therapy orders to increase flexion toward goal   Instruct patient/family in ordered activity level     Problem: Gastrointestinal - Adult  Goal: Maintains adequate nutritional intake  4/2/2025 0053 by Penelope Delgado RN  Outcome: Progressing  4/2/2025 0052 by Penelope Delgado RN  Outcome: Progressing  Flowsheets (Taken 4/1/2025 2000)  Maintains adequate nutritional intake:   Monitor percentage of each meal consumed   Identify factors contributing to decreased intake, treat as appropriate   Assist with meals as needed   Monitor intake and output, weight and lab values   Obtain nutritional consult as needed     Problem: Genitourinary - Adult  Goal: Urinary catheter remains patent  4/2/2025 0053 by Penelope Delgado

## 2025-04-02 NOTE — PLAN OF CARE
Problem: Respiratory - Adult  Goal: Achieves optimal ventilation and oxygenation  4/2/2025 1932 by Joan Varela RCP  Outcome: Progressing     Problem: Respiratory - Adult  Goal: Able to breathe comfortably  Outcome: Progressing     Problem: Respiratory - Adult  Goal: Adequate oxygenation  Description: Adequate oxygenation  Outcome: Progressing     Problem: Respiratory - Adult  Goal: Will be able to breathe spontaneously, without ventilator support  Description: Will be able to breathe spontaneously, without ventilator support  Outcome: Completed

## 2025-04-02 NOTE — FLOWSHEET NOTE
04/01/25 0827   Treatment Team Notification   Reason for Communication Evaluate   Name of Team Member Notified Dr Bills   Treatment Team Role Attending Provider   Method of Communication Secure Message   Response See orders     Pt highly agitated and thrashing all over the bed, also very restless as well.     Provider stated to give 10 of geodon when maxed out on precedex.

## 2025-04-02 NOTE — PROGRESS NOTES
[facility]   Progress Note                   Date:   4/2/2025  Patient name: Aracelis Jimenez  Date of admission:  3/28/2025  2:47 AM  MRN:   [id number]  YOB: 1950  PCP: Francisca Daniels MD    Reason for Admission: Hyperkalemia [E87.5]  Bradycardia [R00.1]  Acute kidney injury [N17.9]  Respiratory failure requiring intubation [J96.90]    Subjective:       Clinical Changes / Abnormalities: Pt seen and examined in the room.  Patient resting in bed with family and RN at bedside. Patient is somewhat improved, restless overnight and precedex was restarted and has since been stopped again. Labs, vitals and tele reviewed- ST, 108    Medications:   Scheduled Meds:   traZODone  100 mg Oral Nightly    [START ON 4/3/2025] lansoprazole  30 mg Oral QAM AC    metoprolol tartrate  12.5 mg Oral BID    levothyroxine  75 mcg Oral Daily    atorvastatin  40 mg Oral Daily    ipratropium 0.5 mg-albuterol 2.5 mg  1 Dose Inhalation Q4H WA RT    cefTRIAXone (ROCEPHIN) IV  2,000 mg IntraVENous Q24H    [Held by provider] insulin glargine  14 Units SubCUTAneous Nightly    sodium chloride flush  5-40 mL IntraVENous 2 times per day    heparin (porcine)  5,000 Units SubCUTAneous 3 times per day    insulin lispro  0-8 Units SubCUTAneous Q6H    sodium chloride flush  5-40 mL IntraVENous 2 times per day     Continuous Infusions:   dexmedeTOMIDine (PRECEDEX) 1,000 mcg in sodium chloride 0.9 % 250 mL infusion 0.2 mcg/kg/hr (04/02/25 1227)    dextrose      DOPamine Stopped (03/31/25 1233)    sodium chloride      sodium chloride       CBC:   Recent Labs     03/31/25 0723 04/01/25  0337 04/02/25  0303   WBC 5.1 5.4 5.7   HGB 11.9 11.9 9.9*   PLT 89* 99* 101*     BMP:    Recent Labs     03/31/25 0723 04/01/25  0337 04/02/25  0303    136 137   K 3.7 4.0 3.5*    105 104   CO2 22 19* 22   BUN 23 19 16   CREATININE 1.2* 0.9 1.0*   GLUCOSE 268* 201* 243*     Hepatic:   No results for input(s): \"AST\", \"ALT\", \"BILITOT\",  \"ALKPHOS\" in the last 72 hours.    Invalid input(s): \"ALB\"    Troponin: No results for input(s): \"TROPHS\" in the last 72 hours.  BNP: No results for input(s): \"BNP\" in the last 72 hours.  Lipids: No results for input(s): \"CHOL\", \"HDL\" in the last 72 hours.    Invalid input(s): \"LDLCALCU\"  INR:   No results for input(s): \"INR\" in the last 72 hours.      Objective:   Vitals: BP (!) 155/70   Pulse 84   Temp 97.5 °F (36.4 °C) (Temporal)   Resp 24   Ht 1.575 m (5' 2\")   Wt 89 kg (196 lb 4.8 oz)   SpO2 91%   BMI 35.90 kg/m²   General appearance: intubated/sedated on vent  HEENT: Head: Normocephalic, no lesions, without obvious abnormality.  Neck: no JVD, trachea midline, no adenopathy  Lungs: Clear to auscultation  Heart: Regular rate and rhythm, s1/s2 auscultated, no murmurs  Abdomen: soft, non-tender, bowel sounds active  Extremities: no edema  Neurologic: not done    2D ECHO (3/28/25)      Left Ventricle: Normal left ventricular systolic function with a visually estimated EF of 55 - 60%. EF by visual approximation is 55%. Left ventricle size is normal. Normal wall motion. Grade I diastolic dysfunction with normal LAP.    Right Ventricle: Right ventricle is mildly dilated. Mildly reduced systolic function.    Mitral Valve: Mild regurgitation.    Image quality is adequate. Technically difficult study.    Assessment / Acute Cardiac Problems:   Junctional bradycardia, now resolved, due to DKA with acidosis, hyperkalemia and recent increase in metoprolol dose  Type II DM with DKA, improving  Acute hypoxic respiratory failure; suspected MARILU  Essential HTN with preserved LVEF  CKD Stage III b  H/o CVA  Acute encephalopathy    Patient Active Problem List:     Primary hypertension     Mixed hyperlipidemia     Type 2 diabetes mellitus with hyperglycemia, with long-term current use of insulin (HCC)     Acute ischemic stroke (HCC)     Hypothyroidism     Cerebrovascular accident (CVA) (HCC)-2 years back     CKD stage 3 due  to type 2 diabetes mellitus (HCC)     Chronic diarrhea     Acute cystitis     ALPHONSE (acute kidney injury)     Facial droop     Intracranial vascular stenosis     Intracranial atherosclerosis     Recurrent urinary tract infection     Diabetic autonomic neuropathy associated with type 2 diabetes mellitus (HCC)     Respiratory failure requiring intubation (HCC)     Acute kidney injury     Symptomatic bradycardia      Plan of Treatment:   HR SR/mild ST. Still somewhat restless. She is NPO, plans for Barium swallow study tomorrow. Will monitor for now,   Continue holding Toprol, lisinopril/ HCTZ and amlodipine; nephrology following. Will consider resuming low dose BB in the next 24 hours once she is able to take PO.   PRN Lasix  No indications for permanent pacing at this time.   IV antibiotics for PNA per primary   Will continue to monitor     Electronically signed by DARYL Sheets NP on 4/2/2025 at 1:25 PM  [facility] [employer].  [phone]

## 2025-04-02 NOTE — PROGRESS NOTES
End Of Shift Note  [facility]  Summary of shift: pt has been confused, agitated, and restless throughout the shift, on and off following commands but not making sense when speaking asking 'what are we', and repeating names. Pt on precedex at 1.4 and sitter was started in beginning of shift. Potassium 3.5, replacement started. Magnesium at 1.5, replacements ordered. Platelets 101, NP ok with giving heparin. Pt congested, NT suctioned twice. Pt coughing occasionally but not able to spit anything up, once swallowed sputum. Good urine output, VSS. O2 would drop when worked up, but would come up when less agitated.     Vitals:    Vitals:    04/02/25 0333 04/02/25 0400 04/02/25 0500 04/02/25 0548   BP:  133/72 (!) 101/90    Pulse: 73 73 69    Resp:  15 22    Temp:  97 °F (36.1 °C)     TempSrc:       SpO2:  94% 96%    Weight:  89.8 kg (198 lb)  89 kg (196 lb 4.8 oz)   Height:            I&O:   Intake/Output Summary (Last 24 hours) at 4/2/2025 0553  Last data filed at 4/2/2025 0552  Gross per 24 hour   Intake 1404.15 ml   Output 2130 ml   Net -725.85 ml       Resp Status: 5L NC    Ventilator Settings:  Vent Mode: CPAP/PS Resp Rate (Set): 12 bpm/Vt (Set, mL): 550 mL/ /FiO2 : 100 %    Critical Care IV infusions:   dexmedeTOMIDine (PRECEDEX) 1,000 mcg in sodium chloride 0.9 % 250 mL infusion 1.4 mcg/kg/hr (04/02/25 0552)    dextrose      DOPamine Stopped (03/31/25 1233)    sodium chloride      sodium chloride      sodium chloride 50 mL/hr at 04/02/25 0552        LDA:   Peripheral IV 04/01/25 Distal;Left;Ventral Cephalic (Active)   Number of days: 0       Urinary Catheter 03/28/25 (Active)   Number of days: 5       Midline 03/28/25 Right Brachial (Active)   Number of days: 4

## 2025-04-02 NOTE — PLAN OF CARE
Problem: Safety - Medical Restraint  Goal: Remains free of injury from restraints (Restraint for Interference with Medical Device)  Description: INTERVENTIONS:  1. Determine that other, less restrictive measures have been tried or would not be effective before applying the restraint  2. Evaluate the patient's condition at the time of restraint application  3. Inform patient/family regarding the reason for restraint  4. Q2H: Monitor safety, psychosocial status, comfort, nutrition and hydration  4/1/2025 1205 by Pamella Mott RN  Outcome: Completed

## 2025-04-02 NOTE — PROGRESS NOTES
NEUROLOGY INPATIENT PROGRESS NOTE    4/2/2025         Aracelis Jimenez is a  74 y.o. female admitted on 3/28/2025 with  Hyperkalemia [E87.5]  Bradycardia [R00.1]  Acute kidney injury [N17.9]  Respiratory failure requiring intubation [J96.90]    Reason for consult: Acute metabolic encephalopathy and not following commands off sedation on ventilator   History is obtained mostly from the medical record and from patient's daughter at bedside and the caregivers. Chart is reviewed and patient is examined.   Briefly, this is a  74 y.o. female with hx of HTN, diabetes, CKD and prior cva and hx of tremors was admitted on 3/28/2025 with bradycardia and altered mentation.  Patient was sitting on the toilet and having difficulty having a bowel movement.  As per patient's family; her antihypertensive medications were recently adjusted and then onwards patient was feeling tired and lightheaded over the past 2 weeks.   On arrival by EMS, patient was found to be bradycardic and hypotensive.  Patient was given atropine without improvement; then she was given Versed and transcutaneous pacing was started.  On arrival to ER, patient's mentation decreased and patient is unable to protect her airway therefore was intubated.  Patient has been on Versed drip.  This early morning patient has seizure-like activity lasting for about 1 minute.  Caregivers witnessed that episode and described it as shaking/tremoring of both upper extremities with eyes staring and fixed for \"less than 1 minute\".  Vitals at that time were 155/85; pulse 101/min.  Patient was on Versed drip.  Patient does not have any history of seizures.  Of note; patient was following up at [facility] neurology; patient has several year hx of tremors involving bilateral upper extremities for which she was on primidone and it was discontinued as it was ineffective and caused patient to be drowsy. Patient's dad also has hx of tremors.  Patient was on gabapentin \"for a while\" to help  unremarkable.    IMPRESSION  Subtle punctate foci of acute infarction involving the left periatrial white  matter and left inferior parietal lobule subcortical white matter..    Old lacunar infarction right caudate head.    There is no acute intracranial hemorrhage, or intracranial mass lesion.    Mild chronic microangiopathic ischemic disease.    Mild generalized volume loss.    RECOMMENDATIONS:  Unavailable      Results for orders placed during the hospital encounter of 03/28/25    CT HEAD WO CONTRAST    Narrative  EXAMINATION:  CT OF THE HEAD WITHOUT CONTRAST  4/1/2025 5:27 am    TECHNIQUE:  CT of the head was performed without the administration of intravenous  contrast. Automated exposure control, iterative reconstruction, and/or weight  based adjustment of the mA/kV was utilized to reduce the radiation dose to as  low as reasonably achievable.    COMPARISON:  03/28/2025 CT    HISTORY:  ORDERING SYSTEM PROVIDED HISTORY: poss seizure activity  TECHNOLOGIST PROVIDED HISTORY:  poss seizure activity    Reason for Exam: poss seizure activity, best images due to pt unable to hold  still even with pt tubed    FINDINGS:  BRAIN/VENTRICLES: The ventricles and sulci are prominent. Pattern is  consistent with age-related atrophy. No extra-axial fluid collections, and no  sign of recent intracranial hemorrhage. Decreased attenuation is noted within  the periventricular white matter. Pattern is consistent with chronic small  vessel ischemic change.  Stable pattern of mild encephalomalacia in the  posterior left parietal lobe.  No acute edema or mass effect. No mass lesions  are detected on this noncontrast study.    ORBITS: The visualized portion of the orbits demonstrate no acute abnormality.    SINUSES: Benign effusion in the sphenoid sinus likely positional in this  intubated patient.    SOFT TISSUES/SKULL:  No acute abnormality of the visualized skull or soft  tissues.    Impression  No acute intracranial  abnormality.      Diagnostic data reviewed:  CT head 4/1/2025: no acute intracranial abnormality.     CTA head and neck:     MRI brain:     2D echo:                Impression and Plan: Ms. Aracelis Jimenez is a 74 y.o. female with   New onset seizure on 4/1/2025; has not had any seizure activity over the night; MRI brain and EEG are pending.  Long standing hx of tremors, suggestive of action tremors; not parkinsonian tremors; unable to tolerate Primidone in the past. Unable to increase the dose of Gabapentin to help for tremor control. May benefit from addition of Topamax depending on EEG findings.   Care plan is d/w patient and her son and patient's nurse at bedside.  Will follow with you.         This note was partially created using voice recognition software and is inherently subject to errors including those of syntax and \"sound alike\" substitutions which may escape proofreading.  In such instances, original meaning may be extrapolated by contextual derivation.  Barbara Perez MD 4/2/2025 7:52 AM

## 2025-04-03 ENCOUNTER — APPOINTMENT (OUTPATIENT)
Dept: GENERAL RADIOLOGY | Age: 75
End: 2025-04-03
Payer: MEDICARE

## 2025-04-03 PROBLEM — G92.8 TOXIC METABOLIC ENCEPHALOPATHY: Status: ACTIVE | Noted: 2025-04-03

## 2025-04-03 LAB
ANION GAP SERPL CALCULATED.3IONS-SCNC: 16 MMOL/L (ref 9–16)
BUN SERPL-MCNC: 17 MG/DL (ref 8–23)
CALCIUM SERPL-MCNC: 9 MG/DL (ref 8.8–10.2)
CHLORIDE SERPL-SCNC: 101 MMOL/L (ref 98–107)
CO2 SERPL-SCNC: 22 MMOL/L (ref 20–31)
CREAT SERPL-MCNC: 1.1 MG/DL (ref 0.5–0.9)
ERYTHROCYTE [DISTWIDTH] IN BLOOD BY AUTOMATED COUNT: 13.4 % (ref 11.8–14.4)
GFR, ESTIMATED: 55 ML/MIN/1.73M2
GLUCOSE BLD-MCNC: 204 MG/DL (ref 65–105)
GLUCOSE BLD-MCNC: 228 MG/DL (ref 65–105)
GLUCOSE BLD-MCNC: 254 MG/DL (ref 65–105)
GLUCOSE BLD-MCNC: 282 MG/DL (ref 65–105)
GLUCOSE SERPL-MCNC: 203 MG/DL (ref 82–115)
HCT VFR BLD AUTO: 33.2 % (ref 36.3–47.1)
HGB BLD-MCNC: 11.1 G/DL (ref 11.9–15.1)
MCH RBC QN AUTO: 31.1 PG (ref 25.2–33.5)
MCHC RBC AUTO-ENTMCNC: 33.4 G/DL (ref 28.4–34.8)
MCV RBC AUTO: 93 FL (ref 82.6–102.9)
NRBC BLD-RTO: 0 PER 100 WBC
PLATELET # BLD AUTO: 139 K/UL (ref 138–453)
PMV BLD AUTO: 10.1 FL (ref 8.1–13.5)
POTASSIUM SERPL-SCNC: 3.6 MMOL/L (ref 3.7–5.3)
RBC # BLD AUTO: 3.57 M/UL (ref 3.95–5.11)
SODIUM SERPL-SCNC: 139 MMOL/L (ref 136–145)
WBC OTHER # BLD: 7.6 K/UL (ref 3.5–11.3)

## 2025-04-03 PROCEDURE — 6370000000 HC RX 637 (ALT 250 FOR IP): Performed by: PSYCHIATRY & NEUROLOGY

## 2025-04-03 PROCEDURE — 6360000002 HC RX W HCPCS: Performed by: INTERNAL MEDICINE

## 2025-04-03 PROCEDURE — 94640 AIRWAY INHALATION TREATMENT: CPT

## 2025-04-03 PROCEDURE — 36415 COLL VENOUS BLD VENIPUNCTURE: CPT

## 2025-04-03 PROCEDURE — 97535 SELF CARE MNGMENT TRAINING: CPT

## 2025-04-03 PROCEDURE — 94761 N-INVAS EAR/PLS OXIMETRY MLT: CPT

## 2025-04-03 PROCEDURE — 97168 OT RE-EVAL EST PLAN CARE: CPT

## 2025-04-03 PROCEDURE — 2700000000 HC OXYGEN THERAPY PER DAY

## 2025-04-03 PROCEDURE — 92611 MOTION FLUOROSCOPY/SWALLOW: CPT

## 2025-04-03 PROCEDURE — 99232 SBSQ HOSP IP/OBS MODERATE 35: CPT | Performed by: STUDENT IN AN ORGANIZED HEALTH CARE EDUCATION/TRAINING PROGRAM

## 2025-04-03 PROCEDURE — 97530 THERAPEUTIC ACTIVITIES: CPT

## 2025-04-03 PROCEDURE — 71045 X-RAY EXAM CHEST 1 VIEW: CPT

## 2025-04-03 PROCEDURE — 2500000003 HC RX 250 WO HCPCS: Performed by: INTERNAL MEDICINE

## 2025-04-03 PROCEDURE — 6370000000 HC RX 637 (ALT 250 FOR IP): Performed by: INTERNAL MEDICINE

## 2025-04-03 PROCEDURE — 2060000000 HC ICU INTERMEDIATE R&B

## 2025-04-03 PROCEDURE — 85027 COMPLETE CBC AUTOMATED: CPT

## 2025-04-03 PROCEDURE — 6360000002 HC RX W HCPCS

## 2025-04-03 PROCEDURE — 74230 X-RAY XM SWLNG FUNCJ C+: CPT

## 2025-04-03 PROCEDURE — 99233 SBSQ HOSP IP/OBS HIGH 50: CPT | Performed by: NURSE PRACTITIONER

## 2025-04-03 PROCEDURE — 95816 EEG AWAKE AND DROWSY: CPT

## 2025-04-03 PROCEDURE — 82947 ASSAY GLUCOSE BLOOD QUANT: CPT

## 2025-04-03 PROCEDURE — 97164 PT RE-EVAL EST PLAN CARE: CPT

## 2025-04-03 PROCEDURE — 6370000000 HC RX 637 (ALT 250 FOR IP): Performed by: NURSE PRACTITIONER

## 2025-04-03 PROCEDURE — 6370000000 HC RX 637 (ALT 250 FOR IP): Performed by: STUDENT IN AN ORGANIZED HEALTH CARE EDUCATION/TRAINING PROGRAM

## 2025-04-03 PROCEDURE — 97110 THERAPEUTIC EXERCISES: CPT

## 2025-04-03 PROCEDURE — 99232 SBSQ HOSP IP/OBS MODERATE 35: CPT | Performed by: PSYCHIATRY & NEUROLOGY

## 2025-04-03 PROCEDURE — 95819 EEG AWAKE AND ASLEEP: CPT | Performed by: PSYCHIATRY & NEUROLOGY

## 2025-04-03 PROCEDURE — 80048 BASIC METABOLIC PNL TOTAL CA: CPT

## 2025-04-03 RX ORDER — CLONAZEPAM 0.5 MG/1
0.25 TABLET ORAL NIGHTLY
Status: DISCONTINUED | OUTPATIENT
Start: 2025-04-03 | End: 2025-04-05 | Stop reason: HOSPADM

## 2025-04-03 RX ORDER — TOPIRAMATE 25 MG/1
12.5 TABLET, FILM COATED ORAL 2 TIMES DAILY
Status: DISCONTINUED | OUTPATIENT
Start: 2025-04-03 | End: 2025-04-04

## 2025-04-03 RX ORDER — TRAZODONE HYDROCHLORIDE 100 MG/1
200 TABLET ORAL NIGHTLY
Status: DISCONTINUED | OUTPATIENT
Start: 2025-04-03 | End: 2025-04-05 | Stop reason: HOSPADM

## 2025-04-03 RX ORDER — LOSARTAN POTASSIUM 50 MG/1
50 TABLET ORAL DAILY
Status: DISCONTINUED | OUTPATIENT
Start: 2025-04-03 | End: 2025-04-05 | Stop reason: HOSPADM

## 2025-04-03 RX ORDER — ALBUTEROL SULFATE 0.83 MG/ML
2.5 SOLUTION RESPIRATORY (INHALATION) AS NEEDED
Status: DISCONTINUED | OUTPATIENT
Start: 2025-04-03 | End: 2025-04-05 | Stop reason: HOSPADM

## 2025-04-03 RX ORDER — METOPROLOL TARTRATE 25 MG/1
25 TABLET, FILM COATED ORAL 2 TIMES DAILY
Status: DISCONTINUED | OUTPATIENT
Start: 2025-04-03 | End: 2025-04-04

## 2025-04-03 RX ADMIN — INSULIN LISPRO 4 UNITS: 100 INJECTION, SOLUTION INTRAVENOUS; SUBCUTANEOUS at 12:01

## 2025-04-03 RX ADMIN — INSULIN LISPRO 2 UNITS: 100 INJECTION, SOLUTION INTRAVENOUS; SUBCUTANEOUS at 09:33

## 2025-04-03 RX ADMIN — LANSOPRAZOLE 30 MG: 15 TABLET, ORALLY DISINTEGRATING ORAL at 06:08

## 2025-04-03 RX ADMIN — TOPIRAMATE 12.5 MG: 25 TABLET, FILM COATED ORAL at 16:10

## 2025-04-03 RX ADMIN — INSULIN LISPRO 4 UNITS: 100 INJECTION, SOLUTION INTRAVENOUS; SUBCUTANEOUS at 16:32

## 2025-04-03 RX ADMIN — Medication 3 MG: at 00:33

## 2025-04-03 RX ADMIN — SODIUM CHLORIDE, PRESERVATIVE FREE 10 ML: 5 INJECTION INTRAVENOUS at 08:14

## 2025-04-03 RX ADMIN — ATORVASTATIN CALCIUM 40 MG: 40 TABLET, FILM COATED ORAL at 16:11

## 2025-04-03 RX ADMIN — LOSARTAN POTASSIUM 50 MG: 50 TABLET, FILM COATED ORAL at 16:08

## 2025-04-03 RX ADMIN — INSULIN LISPRO 2 UNITS: 100 INJECTION, SOLUTION INTRAVENOUS; SUBCUTANEOUS at 20:49

## 2025-04-03 RX ADMIN — HEPARIN SODIUM 5000 UNITS: 5000 INJECTION INTRAVENOUS; SUBCUTANEOUS at 06:36

## 2025-04-03 RX ADMIN — IPRATROPIUM BROMIDE AND ALBUTEROL SULFATE 1 DOSE: .5; 2.5 SOLUTION RESPIRATORY (INHALATION) at 12:18

## 2025-04-03 RX ADMIN — SODIUM CHLORIDE, PRESERVATIVE FREE 10 ML: 5 INJECTION INTRAVENOUS at 20:51

## 2025-04-03 RX ADMIN — HEPARIN SODIUM 5000 UNITS: 5000 INJECTION INTRAVENOUS; SUBCUTANEOUS at 16:10

## 2025-04-03 RX ADMIN — TOPIRAMATE 12.5 MG: 25 TABLET, FILM COATED ORAL at 20:51

## 2025-04-03 RX ADMIN — HYDRALAZINE HYDROCHLORIDE 15 MG: 20 INJECTION INTRAMUSCULAR; INTRAVENOUS at 05:08

## 2025-04-03 RX ADMIN — IPRATROPIUM BROMIDE AND ALBUTEROL SULFATE 1 DOSE: .5; 2.5 SOLUTION RESPIRATORY (INHALATION) at 08:00

## 2025-04-03 RX ADMIN — TRAZODONE HYDROCHLORIDE 200 MG: 100 TABLET ORAL at 20:50

## 2025-04-03 RX ADMIN — LEVOTHYROXINE SODIUM 75 MCG: 0.07 TABLET ORAL at 06:08

## 2025-04-03 RX ADMIN — METOPROLOL TARTRATE 25 MG: 25 TABLET, FILM COATED ORAL at 20:50

## 2025-04-03 RX ADMIN — METOPROLOL TARTRATE 25 MG: 25 TABLET, FILM COATED ORAL at 16:09

## 2025-04-03 RX ADMIN — CLONAZEPAM 0.25 MG: 0.5 TABLET ORAL at 20:51

## 2025-04-03 RX ADMIN — FUROSEMIDE 40 MG: 10 INJECTION, SOLUTION INTRAMUSCULAR; INTRAVENOUS at 08:13

## 2025-04-03 RX ADMIN — WATER 2000 MG: 1 INJECTION INTRAMUSCULAR; INTRAVENOUS; SUBCUTANEOUS at 20:49

## 2025-04-03 RX ADMIN — HEPARIN SODIUM 5000 UNITS: 5000 INJECTION INTRAVENOUS; SUBCUTANEOUS at 20:50

## 2025-04-03 RX ADMIN — NALOXEGOL OXALATE 25 MG: 25 TABLET, FILM COATED ORAL at 06:08

## 2025-04-03 ASSESSMENT — PAIN SCALES - GENERAL
PAINLEVEL_OUTOF10: 0

## 2025-04-03 ASSESSMENT — ENCOUNTER SYMPTOMS
EYE DISCHARGE: 0
BACK PAIN: 0
COLOR CHANGE: 0
ABDOMINAL DISTENTION: 0
CHEST TIGHTNESS: 0
EYE ITCHING: 0
APNEA: 0
CONSTIPATION: 0
ABDOMINAL PAIN: 0
FACIAL SWELLING: 0
DIARRHEA: 0

## 2025-04-03 NOTE — PLAN OF CARE
Problem: Safety - Adult  Goal: Free from fall injury  Outcome: Progressing     Problem: Discharge Planning  Goal: Discharge to home or other facility with appropriate resources  Outcome: Progressing     Problem: Skin/Tissue Integrity  Goal: Skin integrity remains intact  Description: 1.  Monitor for areas of redness and/or skin breakdown  2.  Assess vascular access sites hourly  3.  Every 4-6 hours minimum:  Change oxygen saturation probe site  4.  Every 4-6 hours:  If on nasal continuous positive airway pressure, respiratory therapy assess nares and determine need for appliance change or resting period  Outcome: Progressing     Problem: ABCDS Injury Assessment  Goal: Absence of physical injury  Outcome: Progressing     Problem: Neurosensory - Adult  Goal: Achieves stable or improved neurological status  Outcome: Progressing     Problem: Respiratory - Adult  Goal: Achieves optimal ventilation and oxygenation  4/3/2025 1752 by Poli Mello RN  Outcome: Progressing  4/3/2025 0805 by Norma lAmanza, FRANCES  Outcome: Progressing  Goal: Able to breathe comfortably  4/3/2025 0805 by Norma Almanza, CORRINAP  Outcome: Progressing  Goal: Adequate oxygenation  Description: Adequate oxygenation  4/3/2025 0805 by Norma Almanza, RCP  Outcome: Progressing  Goal: Adequate oxygenation  Description: Adequate oxygenation  4/3/2025 0805 by Norma Almanza, RCP  Outcome: Progressing     Problem: Cardiovascular - Adult  Goal: Maintains optimal cardiac output and hemodynamic stability  Outcome: Progressing  Goal: Absence of cardiac dysrhythmias or at baseline  Outcome: Progressing     Problem: Musculoskeletal - Adult  Goal: Return mobility to safest level of function  Outcome: Progressing     Problem: Gastrointestinal - Adult  Goal: Maintains adequate nutritional intake  Outcome: Progressing     Problem: Genitourinary - Adult  Goal: Urinary catheter remains patent  Outcome: Progressing     Problem: Metabolic/Fluid and Electrolytes -

## 2025-04-03 NOTE — CARE COORDINATION
Discharge planning    Pt. On 3L. SNF list provided. Pt. Getting EEG and swallow study today. Daughter will make more choices. Organ house is full.

## 2025-04-03 NOTE — PROGRESS NOTES
Physician Progress Note      PATIENT:               LADARIUS HORTON  CSN #:                  [id number]  :                       1950  ADMIT DATE:       3/28/2025 2:47 AM  DISCH DATE:  RESPONDING  PROVIDER #:        Alex SONG MD          QUERY TEXT:    Elevated cardiac troponin levels are documented in the medical record: 3/28 of   36, then . Please clarify the cause:    The clinical indicators include:  Patient to ED by EMS with low heart rate, low blood pressure and underwent   transcutaneous pacing in ED.  --Noted risk factors of low blood pressure, bradycardia, CKD, HTN, DM2,   hypotensive shock (per query response)  --Per ED provider note, \"was bradycardiac and EMS tried Atropine and then   placed a transcutaneous pacer....Symptomatic bradycardia- persistent despite   Atropine, now she has a transvenous pacer per Cardiology, hold BB for now,   reportedly she received some Glucagon in the ED to try to reverse BB, weaned   off Dopamine.\"  -- Per cardiology consult, \"consulted for junctional bradycardia... PCP had   increased her Lopressor to 100 mg daily from 25 mg p.o. twice daily...   Impression: Junctional Bradycardia - temporary pacemaker placed...Ischemic   workup pending neurology recovery.\"  --Per EP consult, \"was found to have junctional bradycardia with HR 32 bpm   with DKA  with  and serum K+ 6.1...impression: Junctional bradycardia,   now resolved, due to DKA with acidosis, hyperkalemia and recent increase in   metoprolol dose.\"  --HR readings prior to pacemaker placement: 43, 39, 39, 35, 38, 45, 36, 39,   34, 34, 34  3/28 EKG:  *** Critical Test Result: Low HR  Junctional bradycardia  Possible Inferior infarct , age undetermined  Abnormal ECG  When compared with ECG of 2024 15:31,  Junctional rhythm has replaced Sinus rhythm  Vent. rate has decreased BY  28 BPM  Borderline criteria for Inferior infarct are now Present  T wave inversion no longer evident in Lateral  transcutaneous pacer....Symptomatic bradycardia- persistent despite   Atropine, now she has a transvenous pacer per Cardiology...On arrival to the   emergency room patient's mental status is decreased and patient is not able to   protect her airway.  Patient was therefore intubated.... exam: Lungs: Clear   to ascultation bilaterally, no wheeze, no rales, no rhonchi  --Per 3/29 nephro, \"Continues empiric Vanco and cefepime for presumed   pneumonia although chest x-ray clear....  ALPHONSE likely due to ATN from   bradycardia along with hypotension.\"  --Per 4/3 IM note, \"Concern for possible pneumonia while intubated. finishing   antibiotics.\"  -RR prior to intubation 7, 25, 11, 16, 13, 18  -Spo2 81% on 3/28 @ 0337, other readings 90% or greater, intubated in ED.  --HR readings prior to pacemaker placement: 43, 39, 39, 35, 38, 45, 36, 39,   34, 34, 34  --Lab results: wbc 13.8 on 3/29, procal 0.58 on 3/29, lactic acid 2.9 on 3/28  --Noted documentation of ALPHONSE  ------  3/28 CXR:  1. Endotracheal tube and NG tube in proper positions.  2. Small bilateral pleural effusions and mild interstitial pulmonary edema.  3. Curvilinear opacity within the right lung base likely reflects atelectasis.    Treatment: ICU, labs, imaging, card consult, intubation, dopamine gtt,   temporary pacemaker, IV fluids, IV Zithromax, IV Cefepime, IV Rocephin, IV   Vanco  Options provided:  -- Sepsis, due to pneumonia, present on admission  -- Sepsis, present on admission, due to pneumonia, now resolved  -- Sepsis, due to pneumonia, developed following admission  -- Pneumonia without sepsis  -- Sepsis was ruled out  -- Other - I will add my own diagnosis  -- Disagree - Not applicable / Not valid  -- Disagree - Clinically unable to determine / Unknown  -- Refer to Clinical Documentation Reviewer    PROVIDER RESPONSE TEXT:    This patient was treated for sepsis this admission, due to pneumonia, which   was present on admission and is currently  resolved.    Query created by: Haylie Cox on 4/3/2025 10:35 AM      Electronically signed by:  Alex SONG MD 4/3/2025 12:17 PM

## 2025-04-03 NOTE — RT PROTOCOL NOTE
RT Inhaler-Nebulizer Bronchodilator Protocol Note    There is a bronchodilator order in the chart from a provider indicating to follow the RT Bronchodilator Protocol and there is an “Initiate RT Inhaler-Nebulizer Bronchodilator Protocol” order as well (see protocol at bottom of note).    CXR Findings:  XR CHEST PORTABLE  Result Date: 4/2/2025  Removal of ET tube and enteric tube. Perihilar airspace disease suggesting central pulmonary edema. Small right pleural effusion.     XR CHEST PORTABLE  Result Date: 4/1/2025  No significant interval change.       The findings from the last RT Protocol Assessment were as follows:   History Pulmonary Disease: None or smoker <15 pack years  Respiratory Pattern: Dyspnea on exertion or RR 21-25 bpm  Breath Sounds: Intermittent or unilateral wheezes  Cough: Strong, spontaneous, non-productive  Indication for Bronchodilator Therapy:    Bronchodilator Assessment Score: 6    Aerosolized bronchodilator medication orders have been revised according to the RT Inhaler-Nebulizer Bronchodilator Protocol below.    Respiratory Therapist to perform RT Therapy Protocol Assessment initially then follow the protocol.  Repeat RT Therapy Protocol Assessment PRN for score 0-3 or on second treatment, BID, and PRN for scores above 3.    No Indications - adjust the frequency to every 6 hours PRN wheezing or bronchospasm, if no treatments needed after 48 hours then discontinue using Per Protocol order mode.     If indication present, adjust the RT bronchodilator orders based on the Bronchodilator Assessment Score as indicated below.  Use Inhaler orders unless patient has one or more of the following: on home nebulizer, not able to hold breath for 10 seconds, is not alert and oriented, cannot activate and use MDI correctly, or respiratory rate 25 breaths per minute or more, then use the equivalent nebulizer order(s) with same Frequency and PRN reasons based on the score.  If a patient is on this

## 2025-04-03 NOTE — PROGRESS NOTES
[facility]   Progress Note                   Date:   4/3/2025  Patient name: Aracelis Jimenez  Date of admission:  3/28/2025  2:47 AM  MRN:   [id number]  YOB: 1950  PCP: Francisca Daniels MD    Reason for Admission: Hyperkalemia [E87.5]  Bradycardia [R00.1]  Acute kidney injury [N17.9]  Respiratory failure requiring intubation [J96.90]    Subjective:       Clinical Changes / Abnormalities: Pt seen and examined in the room.  Patient resting in chair with family and RN at bedside. Extubated yesterday  Pt shaking/tremors.  Family reports this si normal for her.  Labs, vitals and tele reviewed- ST, 108    -835 ml since admission  Medications:   Scheduled Meds:   metoprolol tartrate  25 mg Oral BID    losartan  50 mg Oral Daily    traZODone  100 mg Oral Nightly    lansoprazole  30 mg Oral QAM AC    levothyroxine  75 mcg Oral Daily    atorvastatin  40 mg Oral Daily    furosemide  40 mg IntraVENous BID    lactulose  20 g Oral BID    naloxegol  25 mg Oral QAM AC    insulin lispro  0-8 Units SubCUTAneous 4x Daily AC & HS    ipratropium 0.5 mg-albuterol 2.5 mg  1 Dose Inhalation Q4H WA RT    cefTRIAXone (ROCEPHIN) IV  2,000 mg IntraVENous Q24H    [Held by provider] insulin glargine  14 Units SubCUTAneous Nightly    sodium chloride flush  5-40 mL IntraVENous 2 times per day    heparin (porcine)  5,000 Units SubCUTAneous 3 times per day    sodium chloride flush  5-40 mL IntraVENous 2 times per day     Continuous Infusions:   dexmedeTOMIDine (PRECEDEX) 1,000 mcg in sodium chloride 0.9 % 250 mL infusion 0.2 mcg/kg/hr (04/02/25 1227)    dextrose      DOPamine Stopped (03/31/25 1233)    sodium chloride      sodium chloride       CBC:   Recent Labs     04/01/25  0337 04/02/25  0303 04/03/25  0554   WBC 5.4 5.7 7.6   HGB 11.9 9.9* 11.1*   PLT 99* 101* 139     BMP:    Recent Labs     04/02/25  0303 04/02/25  1820 04/03/25  0554    139 139   K 3.5* 3.7 3.6*    101 101   CO2 22 24 22   BUN 16 16 17

## 2025-04-03 NOTE — PROGRESS NOTES
Pulmonary Critical Care Progress Note    Patient seen for the follow up of Respiratory failure requiring intubation     Subjective:    She had around 3 L output after Lasix yesterday advised to hold Lasix.  She is now weaned on oxygen sitting in chair was able to ambulate with walker more alert oriented still kind of shaky she just had her swallow study was n.p.o. she has improved shortness of breath.  Heart rate increased    Examination:    Vitals: BP (!) 183/74   Pulse (!) 108   Temp 98 °F (36.7 °C) (Oral)   Resp 26   Ht 1.575 m (5' 2\")   Wt 96.2 kg (212 lb)   SpO2 93%   BMI 38.78 kg/m²   SpO2  Av.4 %  Min: 91 %  Max: 96 %  General appearance: Awake in no acute distress  Neck: No JVD  Lungs: Decreased breath sound no crackles or wheezing  Heart: regular rate and rhythm, S1, S2 normal, no gallop  Abdomen: Soft, non tender, + BS  Extremities: no cyanosis or clubbing. No significant edema    LABs:    CBC:   Recent Labs     25  0303 25  0554   WBC 5.7 7.6   HGB 9.9* 11.1*   HCT 30.0* 33.2*   * 139     BMP:   Recent Labs     25  1820 25  0554    139   K 3.7 3.6*   CO2 24 22   BUN 16 17   CREATININE 1.0* 1.1*   LABGLOM 61 55*   GLUCOSE 166* 203*      Latest Reference Range & Units 25 08:27   POC HCO3 21.0 - 28.0 mmol/L 21.5   POC O2 SAT 94.0 - 98.0 % 92.9 (L)   POC pCO2 35.0 - 48.0 mm Hg 42.9   POC pH 7.350 - 7.450  7.308 (L)   POC PO2 83.0 - 108.0 mm Hg 73.0 (L)   (L): Data is abnormally low   Latest Reference Range & Units 25 05:45 25 08:51 25 03:37   Procalcitonin 0.00 - 0.09 ng/mL 0.58 (H) 0.32 (H) 0.25 (H)   (H): Data is abnormally high    Radiology:  Chest x-ray 4/3 reviewed  Perihilar infiltrates that are improved compared to prior.     Trace effusions.    Chest x-ray     Removal of ET tube and enteric tube.     Perihilar airspace disease suggesting central pulmonary edema. Small right  pleural effusion.         Chest x-ray 3/31

## 2025-04-03 NOTE — PROGRESS NOTES
[facility]  Office: [phone]  Ulises Yoo DO, Glenn Patterson DO, Fernando Wilson DO, Reji Gagnon DO, Radha Grayson MD, Shanell Denney MD, Juanis Zaragoza MD, Carmela Jennings MD,  Kali Harp MD, Mitra Willson MD, Alex Adair MD,  Pamela Hodge DO, Ar Pizano MD, Jaylen Sepulveda MD, Raoul Yoo DO, Katina Deras MD,  Lawrence Davila DO, Belinda Whalen MD, Crystal Rai MD, Lance Spence MD,  Wilson Granger MD, Dom Rogers MD, Bhavana Wyatt MD, Kylah Richmond MD, Humphrey Ureña MD, Guille Garcia MD, Pedro Hernandez DO, Yasir Parker MD, Pamela Patterson MD, Mohsin Reza, MD, Carlie Cohen MD, Shirley Waterhouse, CNP,  Sridevi Muñoz, CNP, Pedro Kuhn, CNP,  Maria G Norman, DNP, Edelmira Oviedo, CNP, Niki Hernandez, CNP, Penelope Reyes, CNP, Shiela Oropeza, CNP, Doris Tolliver, PA-C, Екатерина Thomas, CNP, Nena Baltazar, CNP,  Iliana Rivero, CNP, Simona Foley, CNP, Ramin Villegas, PA-C, Jimnea Castillo, CNP,  Veronica Hogue, CNS, Miladys Matamoros, CNP, Kirsty Blanco, CNP,   Rosaura Wolff, CNP         [facility]   IN-PATIENT SERVICE   [facility]    Progress Note    4/3/2025    8:50 AM    Name:   Aracelis Jimenez  MRN:     [id number]     Acct:      [id number]   Room:   2027/2027-01  IP Day:  6  Admit Date:  3/28/2025  2:47 AM    PCP:   Francisca Daniels MD  Code Status:  Full Code    Subjective:     C/C:   Chief Complaint   Patient presents with    Altered Mental Status     Interval History Status: improved.     Patient seen examined sitting in chair. Alert ot person and place has right sided trmors, probably worsened due to lack of beta blocker. Heart rate slightly tachycardic. BP high. Planning for swallow study today    Brief History:     74-year-old female past medical history of hypertension, diabetes type 2, hypothyroidism, CKD stage IIIa, depression, prior history of stroke (presented to the ER due to having altered mental status and having abdominal  pain.  Patient was intubated in the ER due to airway protection.  Patient found to be bradycardic hypotensiveOn 3/28/2025 patient had temporary pacemaker via left subclavian approach by cardiology.  Patient was weaned off dopamine was of value by EP.  No indication for permanent pacemaker on their evaluation.  Patient was weaned off pressors bradycardia improved patient's creatinine went to baseline.  Plan for possible extubation/41/25.  On 4/1/2025 there is concern for possible seizures over night. Neurology evaluating patient. On topomax. Suspicion that Hypothyroidism is also contributing to the bradycardia and synthroid was incrased to 75 mcg.     Review of Systems:     Review of Systems   Constitutional:  Negative for activity change, appetite change, chills and fever.   HENT:  Negative for congestion, ear pain, facial swelling and mouth sores.    Eyes:  Negative for discharge and itching.   Respiratory:  Negative for apnea and chest tightness.    Cardiovascular:  Negative for chest pain and leg swelling.   Gastrointestinal:  Negative for abdominal distention, abdominal pain, constipation and diarrhea.   Endocrine: Negative for cold intolerance, polyphagia and polyuria.   Genitourinary:  Negative for difficulty urinating, dysuria and flank pain.   Musculoskeletal:  Negative for arthralgias, back pain and joint swelling.   Skin:  Negative for color change and wound.   Neurological:  Negative for dizziness, seizures, light-headedness and headaches.   Psychiatric/Behavioral:  Negative for agitation, behavioral problems and self-injury.        Medications:     Allergies:    Allergies   Allergen Reactions    Morphine Anaphylaxis    Metformin Diarrhea    Sulfa Antibiotics     Adhesive Tape Dermatitis and Rash       Current Meds:   Scheduled Meds:    metoprolol tartrate  25 mg Oral BID    losartan  50 mg Oral Daily    traZODone  200 mg Oral Nightly    lansoprazole  30 mg Oral QAM AC    levothyroxine  75 mcg Oral Daily

## 2025-04-03 NOTE — PROCEDURES
INSTRUMENTAL SWALLOW REPORT  MODIFIED BARIUM SWALLOW    NAME: Aracelis Jimenez   : 1950  MRN: [id number]       Date of Eval: 4/3/2025         Past Medical History:  has a past medical history of Depression, Diabetes mellitus (HCC), Hypertension, Stroke (HCC), and Tremors of nervous system.  Past Surgical History:  has a past surgical history that includes Hysterectomy; Cholecystectomy; and Rib fracture surgery ().    Type of Study: Initial MBS       Recent CXR/CT of Chest: 4/3/25 XR CHEST PORTABLE   IMPRESSION:  Perihilar infiltrates that are improved compared to prior.     Trace effusions.    Patient Complaints/Reason for Referral:  Aracelis Jimenez was referred for a MBS to assess the efficiency of his/her swallow function, assess for aspiration, and to make recommendations regarding safe dietary consistencies, effective compensatory strategies, and safe eating environment.       Onset of problem:   Aracelis Jimenez is a 74 y.o. Non- / non  female who presents with Altered Mental Status   and is admitted to the hospital for the management of Respiratory failure requiring intubation.     Pt is a 74 yr old female with a PMH HTN, DM2, hypothyroid, CKD 3, Depression, and stroke who presented to the ED with bradycardia and altered mental status.  Per her son and daughter, she had been feeling tired and lightheaded over the past 2 weeks.  Her PCP had changed her Lopressor 50mg BID to Toprol XL 100mg daily.  Her baseline Cr 1.5-1.8.  She is not very active at baseline per son.  A daughter was there overnight and her mother went to the bathroom and got weak and fell to the ground.  She was bradycardiac and EMS tried Atropine and then placed a transcutaneous pacer.  Then she was intubated in the ER because she wasn't protecting her airway.  Cardiology came in and placed a transvenous pacer.       She is intubated and sedated.  She was opening her eyes earlier and moved all extremities.  Family denies any hx

## 2025-04-03 NOTE — PROGRESS NOTES
NEUROLOGY INPATIENT PROGRESS NOTE    4/3/2025         Aracelis Jimenez is a  74 y.o. female admitted on 3/28/2025 with  Hyperkalemia [E87.5]  Bradycardia [R00.1]  Acute kidney injury [N17.9]  Respiratory failure requiring intubation [J96.90]    Reason for consult: Acute metabolic encephalopathy and not following commands off sedation on ventilator   History is obtained mostly from the medical record and from patient's daughter at bedside and the caregivers. Chart is reviewed and patient is examined.   Briefly, this is a  74 y.o. female with hx of HTN, diabetes, CKD and prior cva and hx of tremors was admitted on 3/28/2025 with bradycardia and altered mentation.  Patient was sitting on the toilet and having difficulty having a bowel movement.  As per patient's family; her antihypertensive medications were recently adjusted and then onwards patient was feeling tired and lightheaded over the past 2 weeks.   On arrival by EMS, patient was found to be bradycardic and hypotensive.  Patient was given atropine without improvement; then she was given Versed and transcutaneous pacing was started.  On arrival to ER, patient's mentation decreased and patient is unable to protect her airway therefore was intubated.  Patient has been on Versed drip.  This early morning patient has seizure-like activity lasting for about 1 minute.  Caregivers witnessed that episode and described it as shaking/tremoring of both upper extremities with eyes staring and fixed for \"less than 1 minute\".  Vitals at that time were 155/85; pulse 101/min.  Patient was on Versed drip.  Patient does not have any history of seizures.  Of note; patient was following up at [facility] neurology; patient has several year hx of tremors involving bilateral upper extremities for which she was on primidone and it was discontinued as it was ineffective and caused patient to be drowsy. Patient's dad also has hx of tremors.  Patient was on gabapentin \"for a while\" to help

## 2025-04-03 NOTE — PROCEDURES
EEG REPORT       Patient: Aracelis Jimenez Age: 74 y.o.  MRN: [id number]      Referring Provider: No ref. provider found    History: This routine 30 minute scalp EEG was recorded with video- monitoring for a 74 y.o.. female who presented with encephalopathy. Neurology consulted to evaluate for seizures. Off sedation and not following commands on ventilator. This EEG was performed to evaluate for focal and epileptiform abnormalities.     Aracelis Jimenez   Current Facility-Administered Medications   Medication Dose Route Frequency Provider Last Rate Last Admin    metoprolol tartrate (LOPRESSOR) tablet 25 mg  25 mg Oral BID Alex Adair MD   25 mg at 04/03/25 0813    losartan (COZAAR) tablet 50 mg  50 mg Oral Daily Alex Adair MD   50 mg at 04/03/25 0813    traZODone (DESYREL) tablet 200 mg  200 mg Oral Nightly Alex Adair MD        topiramate (TOPAMAX) tablet 12.5 mg  12.5 mg Oral BID Barbara Perez MD        clonazePAM (KLONOPIN) tablet 0.25 mg  0.25 mg Oral Nightly Barbara Perez MD        albuterol (PROVENTIL) (2.5 MG/3ML) 0.083% nebulizer solution 2.5 mg  2.5 mg Nebulization PRN Prabhu Bills MD        dexmedeTOMIDine (PRECEDEX) 1,000 mcg in sodium chloride 0.9 % 250 mL infusion  0.1-1.5 mcg/kg/hr IntraVENous Continuous Dustin Gore MD 4.34 mL/hr at 04/02/25 1227 0.2 mcg/kg/hr at 04/02/25 1227    potassium chloride 10 mEq/100 mL IVPB (Peripheral Line)  10 mEq IntraVENous PRN Kirsty Blanco APRN -  mL/hr at 04/02/25 1237 10 mEq at 04/02/25 1237    magnesium sulfate 2000 mg in 50 mL IVPB premix  2,000 mg IntraVENous PRN Kirsty Blanco APRN - CNP   Stopped at 04/02/25 0935    lansoprazole (PREVACID SOLUTAB) disintegrating tablet 30 mg  30 mg Oral QAM AC Alex Adair MD   30 mg at 04/03/25 0608    levothyroxine (SYNTHROID) tablet 75 mcg  75 mcg Oral Daily Alex Adair MD   75 mcg at 04/03/25 0608    atorvastatin (LIPITOR) tablet 40 mg

## 2025-04-03 NOTE — RT PROTOCOL NOTE
RT Inhaler-Nebulizer Bronchodilator Protocol Note    There is a bronchodilator order in the chart from a provider indicating to follow the RT Bronchodilator Protocol and there is an “Initiate RT Inhaler-Nebulizer Bronchodilator Protocol” order as well (see protocol at bottom of note).    CXR Findings:  XR CHEST PORTABLE  Result Date: 4/2/2025  Removal of ET tube and enteric tube. Perihilar airspace disease suggesting central pulmonary edema. Small right pleural effusion.       The findings from the last RT Protocol Assessment were as follows:   History Pulmonary Disease: None or smoker <15 pack years  Respiratory Pattern: Dyspnea on exertion or RR 21-25 bpm  Breath Sounds: Severe inspiratory and expiratory wheezing or severely diminished  Cough: Strong, productive  Indication for Bronchodilator Therapy: Decreased or absent breath sounds  Bronchodilator Assessment Score: 11    Aerosolized bronchodilator medication orders have been revised according to the RT Inhaler-Nebulizer Bronchodilator Protocol below.    Respiratory Therapist to perform RT Therapy Protocol Assessment initially then follow the protocol.  Repeat RT Therapy Protocol Assessment PRN for score 0-3 or on second treatment, BID, and PRN for scores above 3.    No Indications - adjust the frequency to every 6 hours PRN wheezing or bronchospasm, if no treatments needed after 48 hours then discontinue using Per Protocol order mode.     If indication present, adjust the RT bronchodilator orders based on the Bronchodilator Assessment Score as indicated below.  Use Inhaler orders unless patient has one or more of the following: on home nebulizer, not able to hold breath for 10 seconds, is not alert and oriented, cannot activate and use MDI correctly, or respiratory rate 25 breaths per minute or more, then use the equivalent nebulizer order(s) with same Frequency and PRN reasons based on the score.  If a patient is on this medication at home then do not decrease  Frequency below that used at home.    0-3 - enter or revise RT bronchodilator order(s) to equivalent RT Bronchodilator order with Frequency of every 4 hours PRN for wheezing or increased work of breathing using Per Protocol order mode.        4-6 - enter or revise RT Bronchodilator order(s) to two equivalent RT bronchodilator orders with one order with BID Frequency and one order with Frequency of every 4 hours PRN wheezing or increased work of breathing using Per Protocol order mode.        7-10 - enter or revise RT Bronchodilator order(s) to two equivalent RT bronchodilator orders with one order with TID Frequency and one order with Frequency of every 4 hours PRN wheezing or increased work of breathing using Per Protocol order mode.       11-13 - enter or revise RT Bronchodilator order(s) to one equivalent RT bronchodilator order with QID Frequency and an Albuterol order with Frequency of every 4 hours PRN wheezing or increased work of breathing using Per Protocol order mode.      Greater than 13 - enter or revise RT Bronchodilator order(s) to one equivalent RT bronchodilator order with every 4 hours Frequency and an Albuterol order with Frequency of every 2 hours PRN wheezing or increased work of breathing using Per Protocol order mode.     RT to enter RT Home Evaluation for COPD & MDI Assessment order using Per Protocol order mode.    Electronically signed by RO CADENA RCP on 4/3/2025 at 8:06 AM

## 2025-04-03 NOTE — PROGRESS NOTES
Fair  Standing - Static: Fair, -  Standing - Dynamic: Fair, -  Single Leg Stance R Le  Single Leg Stance L Le  Comments: Standing balance assessed w/ RW    Exercise  PT Exercises  A/AROM Exercises: BLE (seated)  Pressure Relief Exercises: seated weight shifting  Functional Mobility Circuit Training: STS w/ RW & in fritz stedy  Postural Correction Exercises: seated & standing  Breathing Techniques: pursed lip breathing    Patient Education  Patient Education  Education Given To: Patient;Family  Education Provided: Role of Therapy;Plan of Care  Education Provided Comments: Writer reinforced education on: purpose of acute PT, importance of continued mobility throughout admission, safe transfers w/ both RW & fritz stedy, general safety awareness, proper posture, pursed lip breathing, and PT POC.  Pt w/ fair return demo.   Pt would benefit from continued reinforcement of education.  Education Method: Verbal;Demonstration  Barriers to Learning: Cognition  Education Outcome: Continued education needed    Plan  Physical Therapy Plan  General Plan: 5-7 times per week  Current Treatment Recommendations: Strengthening, ROM, Balance training, Functional mobility training, Neuromuscular re-education, Home exercise program, Endurance training, Pain management, Safety education & training, Patient/Caregiver education & training, Therapeutic activities, Positioning, Co-Treatment, Transfer training, Gait training, Equipment evaluation, education, & procurement    Goals  Patient Goals   Patient Goals : To feel better, to return to PLOF  Short Term Goals  Time Frame for Short Term Goals: 12 visits  Short Term Goal 1: Pt to demonstrate bed mobility Baudilio  Short Term Goal 2: Pt to perform STS transfers w/ most appropriate AD independently  Short Term Goal 3: Pt to ambulate at least 50ft w/ RW independently  Short Term Goal 4: Pt to be indep w/ pressure relief techniques in order to maintain skin integrity and prevent pressure  injuries  Short Term Goal 5: Pt to actively participate in at least 30 minutes of physical therapy for ther act, ther ex, balance, gait, and endurance training    Minutes  PT Individual Minutes  Time In: 0844  Time Out: 0932  Minutes: 48  Time Code Minutes  Timed Code Treatment Minutes: 38 Minutes    Co-treatment with OT warranted secondary to decreased safety and independence requiring 2 skilled therapy professionals to address individual discipline's goals. PT addressing pre gait trunk strengthening, weight shifting prior to transfers, and transfer training.      Electronically signed by Kristen Wade PT on 4/3/25 at 11:28 AM EDT

## 2025-04-03 NOTE — PLAN OF CARE
Problem: Respiratory - Adult  Goal: Achieves optimal ventilation and oxygenation  4/3/2025 0805 by Norma Almanza RCP  Outcome: Progressing  4/2/2025 2236 by Penelope Delgado, RN  Outcome: Progressing  Flowsheets (Taken 4/2/2025 2000)  Achieves optimal ventilation and oxygenation:   Assess for changes in respiratory status   Assess for changes in mentation and behavior   Position to facilitate oxygenation and minimize respiratory effort   Oxygen supplementation based on oxygen saturation or arterial blood gases   Initiate smoking cessation protocol as indicated   Encourage broncho-pulmonary hygiene including cough, deep breathe, incentive spirometry   Assess the need for suctioning and aspirate as needed   Assess and instruct to report shortness of breath or any respiratory difficulty  4/2/2025 1932 by Joan Varela RCP  Outcome: Progressing  4/2/2025 1838 by Poli Mello, RN  Outcome: Progressing  Goal: Able to breathe comfortably  4/3/2025 0805 by Norma Almanza RCP  Outcome: Progressing  4/2/2025 1932 by Joan Varela RCP  Outcome: Progressing  Goal: Adequate oxygenation  Description: Adequate oxygenation  4/3/2025 0805 by Norma Almanza RCP  Outcome: Progressing  4/2/2025 1932 by Joan Varela RCP  Outcome: Progressing  Goal: Will be able to breathe spontaneously, without ventilator support  Description: Will be able to breathe spontaneously, without ventilator support  4/2/2025 1932 by Joan Varela RCP  Outcome: Completed  Goal: Adequate oxygenation  Description: Adequate oxygenation  4/3/2025 0805 by Norma Almanza RCP  Outcome: Progressing  4/2/2025 1932 by Joan Varela RCP  Outcome: Progressing

## 2025-04-03 NOTE — PROGRESS NOTES
End Of Shift Note  [facility]  Summary of shift: pt has had a restless night; unable sleep. Pt tolerating pills crushed in pudding well, but will cough with thin liquids. Pt confused and hallucinating a bit ; but oriented X2-3. Sitter will be discontinued in the AM; telesitter will be in place. Plan is for EEG and swallow study done.    Vitals:    Vitals:    04/03/25 0300 04/03/25 0410 04/03/25 0446 04/03/25 0500   BP: (!) 157/66 (!) 177/72 (!) 186/71 (!) 176/76   Pulse: (!) 103 99 (!) 103 100   Resp: 23 27 24 25   Temp:  99 °F (37.2 °C)     TempSrc:  Oral     SpO2: 93% 91%  93%   Weight:       Height:            I&O:   Intake/Output Summary (Last 24 hours) at 4/3/2025 0550  Last data filed at 4/3/2025 0515  Gross per 24 hour   Intake 472.57 ml   Output 3277 ml   Net -2804.43 ml       Resp Status: 4L NC    Ventilator Settings:  Vent Mode: CPAP/PS Resp Rate (Set): 12 bpm/Vt (Set, mL): 550 mL/ /FiO2 : 100 %    Critical Care IV infusions:   dexmedeTOMIDine (PRECEDEX) 1,000 mcg in sodium chloride 0.9 % 250 mL infusion 0.2 mcg/kg/hr (04/02/25 1227)    dextrose      DOPamine Stopped (03/31/25 1233)    sodium chloride      sodium chloride          LDA:   Peripheral IV 04/01/25 Distal;Left;Ventral Cephalic (Active)   Number of days: 1       Urinary Catheter 03/28/25 (Active)   Number of days: 6

## 2025-04-03 NOTE — CARE COORDINATION
Social Work-[facility] is full until  Monday.                    Post Acute Facility/Agency List     Provided child with the following list, the list includes the overall star ratings obtained from CMS per the Medicare Web site (www.Medicare.gov):     [] Long Term Acute Care Facilities  [] Acute Inpatient Rehabilitation Facilities  [x] Skilled Nursing Facilities  [] Hospice Facilities  [] Home Care    Provided verbal instructions on how to utilize the QR Code to obtain additional detailed star ratings from www.Medicare.gov     offered to print and provide the detailed list:    []Accepted   [x]Declined    Daughter is reviewing list. JHeningerLSW

## 2025-04-03 NOTE — PROGRESS NOTES
continued skilled OT services to increase I and safety during functional tasks to return home at [facility] as able.  Prognosis: Fair  Decision Making: Medium Complexity  Activity Tolerance  Activity Tolerance: Treatment limited secondary to medical complications (free text);Patient limited by pain;Patient limited by fatigue  Activity Tolerance Comments: Pt limited fatigue, cognition and elevated HR during mobility  Safety Devices  Type of Devices: Call light within reach;Nurse notified;All aidan prominences offloaded;Sitter present;Chair alarm in place;Left in chair  Restraints  Restraints Initially in Place: No      Restrictions/Precautions  Restrictions/Precautions  Restrictions/Precautions: General Precautions;Fall Risk;Contact Precautions  Activity Level:  (Activity as tolerated)  Required Braces or Orthoses?: No  Position Activity Restriction  Other Position/Activity Restrictions: BUE IV;  telesitter         Subjective  General  Chart Reviewed: Yes  Patient assessed for rehabilitation services?: Yes  Family / Caregiver Present: Yes (Pts daughter in room during session)  Subjective  Subjective: Pt resting in recliner upon writers arrival. Pleasant and agreeable to OT Re-eval. Pt reporting feeling \"tired.\" Talking very quietly difficult to understand at times.            Home Setup/Prior Level of Function  Social/Functional History  Lives With: Alone  Type of Home: Apartment (1st floor)  Home Layout: One level (laundry upstairs)  Home Access: Level entry;Elevator  Bathroom Shower/Tub: Tub/Shower unit  Bathroom Toilet: Standard  Bathroom Equipment: Grab bars in shower;Tub transfer bench;Grab bars around toilet  Home Equipment: Rollator  Has the patient had two or more falls in the past year or any fall with injury in the past year?: Yes (Pt reporting falls unsure how many)  Prior Level of Assist for ADLs: Independent (sleeps on the couch)  Prior Level of Assist for Homemaking: Independent  Prior Level of Assist for  Ambulation: Independent household ambulator, with or without device (uses rollator)  Prior Level of Assist for Transfers: Independent  Active : No  Patient's  Info: Daughter  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  Leisure & Hobbies: [employer] shopping, [employer]      Vision/Hearing  Vision  Vision: Impaired  Vision Exceptions: Wears glasses for reading  Hearing  Hearing: Within functional limits      BUE Assessment  Gross Assessment  AROM: Generally decreased, functional (B shld ~ 90 degrees)  Strength: Generally decreased, functional (B UE ~ 3+/5)  Coordination: Generally decreased, functional (decreased coordination, slowed slowed/delayed B finger to thumb opposition)  Tone: Normal  Sensation: Impaired (N/T in B hands and feet)       Objective  Orientation  Overall Orientation Status: Within Functional Limits  Cognition  Overall Cognitive Status: Exceptions  Arousal/Alertness: Appropriate responses to stimuli;Delayed responses to stimuli  Following Commands: Follows one step commands with repetition;Follows one step commands with increased time  Attention Span: Appears intact  Memory: Decreased short term memory;Decreased recall of recent events  Safety Judgement: Decreased awareness of need for safety  Problem Solving: Decreased awareness of errors;Assistance required to correct errors made;Assistance required to identify errors made;Assistance required to generate solutions;Assistance required to implement solutions  Insights: Decreased awareness of deficits  Initiation: Requires cues for some  Sequencing: Requires cues for some    Observation/Palpation  Posture: Fair  Observation: Tremors noted BUE (R worse than L); BMI > 38        Activities of Daily Living  Feeding: Setup;Minimal assistance  Grooming: Setup;Moderate assistance;Maximum assistance  UE Bathing: Setup;Moderate assistance  LE Bathing: Setup;Maximum assistance;Dependent/Total  UE Dressing: Setup;Moderate assistance (for doffing gown and  donning clean gown while seated in recliner)  LE Dressing: Dependent/Total  Putting On/Taking Off Footwear: Dependent/Total (to adjust B socks while seated in recliner)  Toileting: Dependent/Total (For hygiene after BM standing in Kirstin Aguilar)  Additional Comments: ADL performance is limited by poor activity tolerance, fatigue, cognition and body habitus.      Transfers/Mobility  Bed mobility  Bed Mobility Comments: Not assessed this date. Pt in recliner at beginning and end of session.         Transfers  Sit to stand: Moderate assistance;2 Person assistance;Minimal assistance  Stand to sit: Moderate assistance;2 Person assistance;Minimal assistance  Transfer Comments: Pt completed 2 STS transfers from recliner height once with Kirstin Stedy once with RW requiring Min/Mod A x2. Max verbal cues for RW safety, pushing up from bed vs pulling on RW, controlled stand to sit and initiation.      Toilet Transfers  Toilet - Technique: Ambulating  Equipment Used: Grab bars  Toilet Transfer: Maximum assistance;2 Person assistance  Toilet Transfers Comments: Pt required significantly increased assistance to stand from lower toilet surface height. Max verbal cues for initiation, hand/footplacement on Kirstin Lauren, pursed lip breathing, upright posture.      Functional Mobility: Moderate assistance  Functional Mobility Skilled Clinical Factors: Pt completed functional mobility from recliner to toilet using RW and Mod A. Pt HR elevated to ~ 130 bpm RN aware. Pt fatigues quickly. Kirstin Lauren used to move pt back to recliner d/t fatigue and HR.                Patient Education  Patient Education  Education Given To: Patient  Education Provided: Role of Therapy;Equipment;Plan of Care;Energy Conservation;Fall Prevention Strategies;ADL Adaptive Strategies;Transfer Training;Mobility Training  Education Provided Comments: pursed lip breathing tech, recommendations for discharge/AE, safety in function, AD use/safety, importance of continued

## 2025-04-03 NOTE — CARE COORDINATION
Social Work-Spoke with daughter regarding second choice for SNF. If [facility] remains full, the second choice is [facility]. Sent referral. Alma

## 2025-04-03 NOTE — PROGRESS NOTES
End Of Shift Note  [facility]  Summary of shift: Pt had Barium swallow study done. Diet advanced to honey thick liquids and soft and bite sized/dysphasia diet. EEG complete and showed \"moderate diffuse slowing\". Lozano was removed and pt has been urinating and had BM today. Still requiring 2x fritz steady but pt is continent and has been getting to the bathroom. Topamax started to reduce tremors and Klonopin to aid in sleep.     Vitals:    Vitals:    04/03/25 1218 04/03/25 1600 04/03/25 1615 04/03/25 1700   BP:  (!) 187/98  (!) 147/62   Pulse: (!) 108 (!) 119 (!) 117 (!) 108   Resp:       Temp:  98 °F (36.7 °C)     TempSrc:  Oral     SpO2: 93% 97% 97% 97%   Weight:       Height:            I&O:   Intake/Output Summary (Last 24 hours) at 4/3/2025 1754  Last data filed at 4/3/2025 0830  Gross per 24 hour   Intake 10 ml   Output 625 ml   Net -615 ml       Resp Status: 2L NC    Ventilator Settings:  Vent Mode: CPAP/PS Resp Rate (Set): 12 bpm/Vt (Set, mL): 550 mL/ /FiO2 : 100 %    Critical Care IV infusions:   dexmedeTOMIDine (PRECEDEX) 1,000 mcg in sodium chloride 0.9 % 250 mL infusion 0.2 mcg/kg/hr (04/02/25 1227)    dextrose      sodium chloride          LDA:   Peripheral IV 04/01/25 Distal;Left;Ventral Cephalic (Active)   Number of days: 2       External Urinary Catheter (Active)   Number of days: 0

## 2025-04-03 NOTE — PLAN OF CARE
Problem: Safety - Adult  Goal: Free from fall injury  4/2/2025 2236 by Penelope Delgado RN  Outcome: Progressing  4/2/2025 1838 by Poli Mello RN  Outcome: Progressing     Problem: Discharge Planning  Goal: Discharge to home or other facility with appropriate resources  4/2/2025 2236 by Penelope Delgado RN  Outcome: Progressing  Flowsheets (Taken 4/2/2025 2000)  Discharge to home or other facility with appropriate resources:   Identify barriers to discharge with patient and caregiver   Arrange for needed discharge resources and transportation as appropriate   Identify discharge learning needs (meds, wound care, etc)   Arrange for interpreters to assist at discharge as needed   Refer to discharge planning if patient needs post-hospital services based on physician order or complex needs related to functional status, cognitive ability or social support system  4/2/2025 1838 by Poli Mello, RN  Outcome: Progressing     Problem: Skin/Tissue Integrity  Goal: Skin integrity remains intact  Description: 1.  Monitor for areas of redness and/or skin breakdown  2.  Assess vascular access sites hourly  3.  Every 4-6 hours minimum:  Change oxygen saturation probe site  4.  Every 4-6 hours:  If on nasal continuous positive airway pressure, respiratory therapy assess nares and determine need for appliance change or resting period  4/2/2025 2236 by Penelope Delgado RN  Outcome: Progressing  Flowsheets (Taken 4/2/2025 2000)  Skin Integrity Remains Intact:   Every 4-6 hours minimum:  Change oxygen saturation probe site   Monitor for areas of redness and/or skin breakdown   Assess vascular access sites hourly   Every 4-6 hours:  If on nasal continuous positive airway pressure, assess nares and determine need for appliance change or resting period   Turn and reposition as indicated   Assess need for specialty bed   Positioning devices  4/2/2025 1838 by Poli Mello, RN  Outcome: Progressing     Problem: ABCDS Injury Assessment  Goal:  medications, impaired vision or hearing, underlying metabolic abnormalities, dehydration, psychiatric diagnoses, and notify attending LIP  2. [place] high risk fall precautions, as indicated  3. Provide frequent short contacts to provide reality reorientation, refocusing and direction  4. Decrease environmental stimuli, including noise as appropriate  5. Monitor and intervene to maintain adequate nutrition, hydration, elimination, sleep and activity  6. If unable to ensure safety without constant attention obtain sitter and review sitter guidelines with assigned personnel  7. Initiate Psychosocial CNS and Spiritual Care consult, as indicated  4/2/2025 2236 by Penelope Delgado, RN  Outcome: Progressing  Flowsheets (Taken 4/2/2025 2000)  Effect of thought disturbance (confusion, delirium, dementia, or psychosis) are managed with adequate functional status:   Assess for contributors to thought disturbance, including medications, impaired vision or hearing, underlying metabolic abnormalities, dehydration, psychiatric diagnoses, notify LIP   Provide frequent short contacts to provide reality reorientation, refocusing and direction   Decrease environmental stimuli, including noise as appropriate   Monitor and intervene to maintain adequate nutrition, hydration, elimination, sleep and activity   [place] high risk fall precautions, as indicated   If unable to ensure safety without constant attention obtain sitter and review sitter guidelines with assigned personnel   Initiate Psychosocial Clinical Nurse Specialist and Spiritual Care consult, as indicated  4/2/2025 1838 by Poli Mello, RN  Outcome: Progressing

## 2025-04-04 ENCOUNTER — APPOINTMENT (OUTPATIENT)
Dept: GENERAL RADIOLOGY | Age: 75
End: 2025-04-04
Payer: MEDICARE

## 2025-04-04 LAB
ANION GAP SERPL CALCULATED.3IONS-SCNC: 15 MMOL/L (ref 9–16)
BUN SERPL-MCNC: 23 MG/DL (ref 8–23)
CALCIUM SERPL-MCNC: 8.8 MG/DL (ref 8.8–10.2)
CHLORIDE SERPL-SCNC: 102 MMOL/L (ref 98–107)
CO2 SERPL-SCNC: 22 MMOL/L (ref 20–31)
CREAT SERPL-MCNC: 1.3 MG/DL (ref 0.5–0.9)
ERYTHROCYTE [DISTWIDTH] IN BLOOD BY AUTOMATED COUNT: 13.5 % (ref 11.8–14.4)
GFR, ESTIMATED: 44 ML/MIN/1.73M2
GLUCOSE BLD-MCNC: 226 MG/DL (ref 65–105)
GLUCOSE BLD-MCNC: 233 MG/DL (ref 65–105)
GLUCOSE BLD-MCNC: 254 MG/DL (ref 65–105)
GLUCOSE BLD-MCNC: 280 MG/DL (ref 65–105)
GLUCOSE SERPL-MCNC: 263 MG/DL (ref 82–115)
HCT VFR BLD AUTO: 33.8 % (ref 36.3–47.1)
HGB BLD-MCNC: 11 G/DL (ref 11.9–15.1)
MCH RBC QN AUTO: 30.9 PG (ref 25.2–33.5)
MCHC RBC AUTO-ENTMCNC: 32.5 G/DL (ref 28.4–34.8)
MCV RBC AUTO: 94.9 FL (ref 82.6–102.9)
NRBC BLD-RTO: 0 PER 100 WBC
PLATELET # BLD AUTO: 167 K/UL (ref 138–453)
PMV BLD AUTO: 9.7 FL (ref 8.1–13.5)
POTASSIUM SERPL-SCNC: 3.8 MMOL/L (ref 3.7–5.3)
RBC # BLD AUTO: 3.56 M/UL (ref 3.95–5.11)
SODIUM SERPL-SCNC: 139 MMOL/L (ref 136–145)
WBC OTHER # BLD: 6.9 K/UL (ref 3.5–11.3)

## 2025-04-04 PROCEDURE — 97535 SELF CARE MNGMENT TRAINING: CPT

## 2025-04-04 PROCEDURE — 71045 X-RAY EXAM CHEST 1 VIEW: CPT

## 2025-04-04 PROCEDURE — 6360000002 HC RX W HCPCS: Performed by: INTERNAL MEDICINE

## 2025-04-04 PROCEDURE — 6370000000 HC RX 637 (ALT 250 FOR IP): Performed by: NURSE PRACTITIONER

## 2025-04-04 PROCEDURE — 6360000002 HC RX W HCPCS

## 2025-04-04 PROCEDURE — 99232 SBSQ HOSP IP/OBS MODERATE 35: CPT | Performed by: PSYCHIATRY & NEUROLOGY

## 2025-04-04 PROCEDURE — 2060000000 HC ICU INTERMEDIATE R&B

## 2025-04-04 PROCEDURE — 97530 THERAPEUTIC ACTIVITIES: CPT

## 2025-04-04 PROCEDURE — 2700000000 HC OXYGEN THERAPY PER DAY

## 2025-04-04 PROCEDURE — 2500000003 HC RX 250 WO HCPCS: Performed by: INTERNAL MEDICINE

## 2025-04-04 PROCEDURE — 99233 SBSQ HOSP IP/OBS HIGH 50: CPT | Performed by: NURSE PRACTITIONER

## 2025-04-04 PROCEDURE — 6370000000 HC RX 637 (ALT 250 FOR IP): Performed by: PSYCHIATRY & NEUROLOGY

## 2025-04-04 PROCEDURE — 6370000000 HC RX 637 (ALT 250 FOR IP): Performed by: STUDENT IN AN ORGANIZED HEALTH CARE EDUCATION/TRAINING PROGRAM

## 2025-04-04 PROCEDURE — 97116 GAIT TRAINING THERAPY: CPT

## 2025-04-04 PROCEDURE — 85027 COMPLETE CBC AUTOMATED: CPT

## 2025-04-04 PROCEDURE — 36415 COLL VENOUS BLD VENIPUNCTURE: CPT

## 2025-04-04 PROCEDURE — 80048 BASIC METABOLIC PNL TOTAL CA: CPT

## 2025-04-04 PROCEDURE — 94761 N-INVAS EAR/PLS OXIMETRY MLT: CPT

## 2025-04-04 PROCEDURE — 82947 ASSAY GLUCOSE BLOOD QUANT: CPT

## 2025-04-04 PROCEDURE — 99232 SBSQ HOSP IP/OBS MODERATE 35: CPT | Performed by: STUDENT IN AN ORGANIZED HEALTH CARE EDUCATION/TRAINING PROGRAM

## 2025-04-04 RX ORDER — CLONAZEPAM 0.5 MG/1
0.25 TABLET ORAL NIGHTLY
Qty: 4 TABLET | Refills: 0 | Status: SHIPPED | OUTPATIENT
Start: 2025-04-04 | End: 2025-04-12

## 2025-04-04 RX ORDER — MAGNESIUM HYDROXIDE/ALUMINUM HYDROXICE/SIMETHICONE 120; 1200; 1200 MG/30ML; MG/30ML; MG/30ML
30 SUSPENSION ORAL EVERY 6 HOURS PRN
Status: DISCONTINUED | OUTPATIENT
Start: 2025-04-04 | End: 2025-04-05 | Stop reason: HOSPADM

## 2025-04-04 RX ORDER — METOPROLOL SUCCINATE 50 MG/1
50 TABLET, EXTENDED RELEASE ORAL DAILY
Status: DISCONTINUED | OUTPATIENT
Start: 2025-04-04 | End: 2025-04-05 | Stop reason: HOSPADM

## 2025-04-04 RX ORDER — METOPROLOL SUCCINATE 50 MG/1
50 TABLET, EXTENDED RELEASE ORAL DAILY
Qty: 30 TABLET | Refills: 3 | Status: SHIPPED | OUTPATIENT
Start: 2025-04-05

## 2025-04-04 RX ORDER — TOPIRAMATE 25 MG/1
25 TABLET, FILM COATED ORAL 2 TIMES DAILY
Status: DISCONTINUED | OUTPATIENT
Start: 2025-04-04 | End: 2025-04-05 | Stop reason: HOSPADM

## 2025-04-04 RX ORDER — TOPIRAMATE 25 MG/1
25 TABLET, FILM COATED ORAL 2 TIMES DAILY
Qty: 60 TABLET | Refills: 0 | Status: SHIPPED | OUTPATIENT
Start: 2025-04-04 | End: 2025-05-04

## 2025-04-04 RX ORDER — TRAZODONE HYDROCHLORIDE 100 MG/1
100 TABLET ORAL NIGHTLY
Qty: 7 TABLET | Refills: 0 | Status: SHIPPED | OUTPATIENT
Start: 2025-04-04 | End: 2025-04-11

## 2025-04-04 RX ORDER — LEVOTHYROXINE SODIUM 75 UG/1
75 TABLET ORAL DAILY
Qty: 30 TABLET | Refills: 3 | Status: SHIPPED | OUTPATIENT
Start: 2025-04-05

## 2025-04-04 RX ORDER — LOSARTAN POTASSIUM 50 MG/1
50 TABLET ORAL DAILY
Qty: 30 TABLET | Refills: 3 | Status: SHIPPED | OUTPATIENT
Start: 2025-04-05

## 2025-04-04 RX ORDER — LANSOPRAZOLE 30 MG/1
30 TABLET, ORALLY DISINTEGRATING, DELAYED RELEASE ORAL
Qty: 30 TABLET | Refills: 3 | Status: SHIPPED | OUTPATIENT
Start: 2025-04-05

## 2025-04-04 RX ADMIN — HEPARIN SODIUM 5000 UNITS: 5000 INJECTION INTRAVENOUS; SUBCUTANEOUS at 22:06

## 2025-04-04 RX ADMIN — TRAZODONE HYDROCHLORIDE 200 MG: 100 TABLET ORAL at 22:06

## 2025-04-04 RX ADMIN — INSULIN LISPRO 2 UNITS: 100 INJECTION, SOLUTION INTRAVENOUS; SUBCUTANEOUS at 17:19

## 2025-04-04 RX ADMIN — CLONAZEPAM 0.25 MG: 0.5 TABLET ORAL at 22:06

## 2025-04-04 RX ADMIN — ATORVASTATIN CALCIUM 40 MG: 40 TABLET, FILM COATED ORAL at 10:09

## 2025-04-04 RX ADMIN — SODIUM CHLORIDE, PRESERVATIVE FREE 10 ML: 5 INJECTION INTRAVENOUS at 22:18

## 2025-04-04 RX ADMIN — HEPARIN SODIUM 5000 UNITS: 5000 INJECTION INTRAVENOUS; SUBCUTANEOUS at 06:27

## 2025-04-04 RX ADMIN — HYDRALAZINE HYDROCHLORIDE 15 MG: 20 INJECTION INTRAMUSCULAR; INTRAVENOUS at 05:19

## 2025-04-04 RX ADMIN — TOPIRAMATE 25 MG: 25 TABLET, FILM COATED ORAL at 22:06

## 2025-04-04 RX ADMIN — HEPARIN SODIUM 5000 UNITS: 5000 INJECTION INTRAVENOUS; SUBCUTANEOUS at 12:04

## 2025-04-04 RX ADMIN — INSULIN LISPRO 2 UNITS: 100 INJECTION, SOLUTION INTRAVENOUS; SUBCUTANEOUS at 09:58

## 2025-04-04 RX ADMIN — TOPIRAMATE 12.5 MG: 25 TABLET, FILM COATED ORAL at 10:01

## 2025-04-04 RX ADMIN — METOPROLOL SUCCINATE 50 MG: 50 TABLET, EXTENDED RELEASE ORAL at 10:06

## 2025-04-04 RX ADMIN — LEVOTHYROXINE SODIUM 75 MCG: 0.07 TABLET ORAL at 06:26

## 2025-04-04 RX ADMIN — INSULIN LISPRO 4 UNITS: 100 INJECTION, SOLUTION INTRAVENOUS; SUBCUTANEOUS at 22:07

## 2025-04-04 RX ADMIN — LANSOPRAZOLE 30 MG: 15 TABLET, ORALLY DISINTEGRATING ORAL at 06:26

## 2025-04-04 RX ADMIN — INSULIN LISPRO 4 UNITS: 100 INJECTION, SOLUTION INTRAVENOUS; SUBCUTANEOUS at 12:03

## 2025-04-04 RX ADMIN — LOSARTAN POTASSIUM 50 MG: 50 TABLET, FILM COATED ORAL at 10:06

## 2025-04-04 RX ADMIN — ALUMINUM HYDROXIDE, MAGNESIUM HYDROXIDE, AND SIMETHICONE 30 ML: 200; 200; 20 SUSPENSION ORAL at 02:15

## 2025-04-04 ASSESSMENT — ENCOUNTER SYMPTOMS
EYE DISCHARGE: 0
ABDOMINAL DISTENTION: 0
APNEA: 0
ABDOMINAL PAIN: 0
CONSTIPATION: 0
CHEST TIGHTNESS: 0
DIARRHEA: 0
COLOR CHANGE: 0
EYE ITCHING: 0
BACK PAIN: 0
FACIAL SWELLING: 0

## 2025-04-04 NOTE — PROGRESS NOTES
[facility]   Progress Note                   Date:   4/4/2025  Patient name: Aracelis Jimenez  Date of admission:  3/28/2025  2:47 AM  MRN:   [id number]  YOB: 1950  PCP: Francisca Daniels MD    Reason for Admission: Hyperkalemia [E87.5]  Bradycardia [R00.1]  Acute kidney injury [N17.9]  Respiratory failure requiring intubation [J96.90]    Subjective:       Clinical Changes / Abnormalities: Pt seen and examined in the room.  Patient resting in chair with family and RN at bedside. Patient has chronic tremors Labs, vitals and tele reviewed- ST, 106      Medications:   Scheduled Meds:   metoprolol succinate  50 mg Oral Daily    losartan  50 mg Oral Daily    traZODone  200 mg Oral Nightly    topiramate  12.5 mg Oral BID    clonazePAM  0.25 mg Oral Nightly    lansoprazole  30 mg Oral QAM AC    levothyroxine  75 mcg Oral Daily    atorvastatin  40 mg Oral Daily    insulin lispro  0-8 Units SubCUTAneous 4x Daily AC & HS    [Held by provider] insulin glargine  14 Units SubCUTAneous Nightly    sodium chloride flush  5-40 mL IntraVENous 2 times per day    heparin (porcine)  5,000 Units SubCUTAneous 3 times per day    sodium chloride flush  5-40 mL IntraVENous 2 times per day     Continuous Infusions:   dexmedeTOMIDine (PRECEDEX) 1,000 mcg in sodium chloride 0.9 % 250 mL infusion 0.2 mcg/kg/hr (04/02/25 1227)    dextrose      sodium chloride       CBC:   Recent Labs     04/02/25  0303 04/03/25  0554 04/04/25  0312   WBC 5.7 7.6 6.9   HGB 9.9* 11.1* 11.0*   * 139 167     BMP:    Recent Labs     04/02/25  1820 04/03/25  0554 04/04/25  0312    139 139   K 3.7 3.6* 3.8    101 102   CO2 24 22 22   BUN 16 17 23   CREATININE 1.0* 1.1* 1.3*   GLUCOSE 166* 203* 263*     Hepatic:   No results for input(s): \"AST\", \"ALT\", \"BILITOT\", \"ALKPHOS\" in the last 72 hours.    Invalid input(s): \"ALB\"    Troponin: No results for input(s): \"TROPHS\" in the last 72 hours.  BNP: No results for input(s): \"BNP\"  atherosclerosis     Recurrent urinary tract infection     Diabetic autonomic neuropathy associated with type 2 diabetes mellitus (HCC)     Respiratory failure requiring intubation (HCC)     Acute kidney injury     Symptomatic bradycardia      Plan of Treatment:   HR SR/mild ST. Pt has not had BB today, she is no longer NPO but does require thickened liquids. Will monitor, can increase BB if needed   IV lasix BID strict I/O   No indications for permanent pacing at this time.   IV antibiotics for PNA per primary   Hypothyroidism-currently uncontrolled which is also contributing to arrhythmias. TSH 13.6 (improved from 21.1 on 3/28/25-continue synthroid 75mcg, restarted on 4/2-managed per primary  Replace k.  Keep K> 4 and MAG > 2   Will continue to monitor     Electronically signed by DARYL Sheets NP on 4/4/2025 at 8:09 AM  [facility] [place].  [phone]

## 2025-04-04 NOTE — PROGRESS NOTES
Physical Therapy  DATE: 2025    NAME: Aracelis Jimenez  MRN: [id number]   : 1950    Patient not seen this date for Physical Therapy due to:      [] Cancel by RN or physician due to:    [] Hemodialysis    [] Critical Lab Value Level     [] Blood transfusion in progress    [] Acute or unstable cardiovascular status   _MAP < 55 or more than >115  _HR < 40 or > 130    [] Acute or unstable pulmonary status   -FiO2 > 60%   _RR < 5 or >40    _O2 sats < 85%    [] Strict Bedrest    [] Off Unit for surgery or procedure    [] Off Unit for testing       [] Pending imaging to R/O fracture    [x] Refusal by Patient:  Writer attempted to see pt for PT treatment.  Upon arrival, multiple family members present at bedside.  One family member stating \"She already had therapy this date.  Somebody was here to have her walk.\"  Writer checked chart and reassured pt and pt's family that therapy staff has not yet been in but nursing staff is able to assist pt w/ mobility throughout the day.  Throughout discussion, pt's lunch arrived and pt requesting therapy check back later.  PT will continue to follow and ck back as able.       [] Other      [] PT being discontinued at this time. Patient independent. No further needs.     [] PT being discontinued at this time as the patient has been transferred to hospice care. No further needs.      Kristen Wade, PT

## 2025-04-04 NOTE — CARE COORDINATION
Social Work-[facility] will have bed tomorrow. Discussed iwht daughter. The first choice is [facility]. Will begin precert. Alma

## 2025-04-04 NOTE — PROGRESS NOTES
End Of Shift Note  [facility]  Summary of shift: Pt had an uneventful shift. Was awake most of the night. Had some mild confusion but was easily redirectable with telesitter. Was able to call out appropriately when needing to use restroom with call light. Did very well transferring with sera steady to and from RR. Was complaining of upset stomach and was given PRN. Had 2 episodes of loose stool. Pt is awake in bed at this time.     Vitals:    Vitals:    04/04/25 0015 04/04/25 0420 04/04/25 0515 04/04/25 0519   BP: (!) 158/86 (!) 179/78 (!) 193/84 (!) 193/84   Pulse: 95 97 100    Resp:  18     Temp:  98 °F (36.7 °C)     TempSrc:       SpO2: 94% 94% 96%    Weight:  90.1 kg (198 lb 9.6 oz)     Height:            I&O:   Intake/Output Summary (Last 24 hours) at 4/4/2025 0635  Last data filed at 4/4/2025 0220  Gross per 24 hour   Intake 115 ml   Output 240 ml   Net -125 ml       Resp Status: Maintaining on 2L NC    Ventilator Settings:  Vent Mode: CPAP/PS Resp Rate (Set): 12 bpm/Vt (Set, mL): 550 mL/ /FiO2 : 100 %    Critical Care IV infusions:   dexmedeTOMIDine (PRECEDEX) 1,000 mcg in sodium chloride 0.9 % 250 mL infusion 0.2 mcg/kg/hr (04/02/25 1227)    dextrose      sodium chloride          LDA:   Peripheral IV 04/01/25 Distal;Left;Ventral Cephalic (Active)   Number of days: 2

## 2025-04-04 NOTE — DISCHARGE INSTR - COC
Continuity of Care Form    Patient Name: Aracelis Jimenez   :  1950  MRN:  [id number]    Admit date:  3/28/2025  Discharge date:      Code Status Order: Full Code   Advance Directives:     Admitting Physician:  Radha Grayson MD  PCP: Francisca Daniels MD    Discharging Nurse: Becky   Discharging Hospital Unit/Room#:   Discharging Unit Phone Number: [phone]    Emergency Contact:   Extended Emergency Contact Information  Primary Emergency Contact: Pati Centeno  Home Phone: [phone]  Relation: Child   needed? No  Secondary Emergency Contact: Jamar Jimenez  Home Phone: [phone]  Relation: Child   needed? No    Past Surgical History:  Past Surgical History:   Procedure Laterality Date    CHOLECYSTECTOMY      HYSTERECTOMY (CERVIX STATUS UNKNOWN)      RIB FRACTURE SURGERY      [facility]       Immunization History:     There is no immunization history on file for this patient.    Active Problems:  Patient Active Problem List   Diagnosis Code    Primary hypertension I10    Mixed hyperlipidemia E78.2    Type 2 diabetes mellitus with hyperglycemia, with long-term current use of insulin ([facility]) E11.65, Z79.4    Acute ischemic stroke ([facility]) I63.9    Hypothyroidism E03.9    Cerebrovascular accident (CVA) ([facility])-2 years back I63.9    CKD stage 3 due to type 2 diabetes mellitus (HCC) E11.22, N18.30    Chronic diarrhea K52.9    Acute cystitis N30.00    ALPHONSE (acute kidney injury) N17.9    Facial droop R29.810    Intracranial vascular stenosis I67.9    Intracranial atherosclerosis I67.2    Recurrent urinary tract infection N39.0    Diabetic autonomic neuropathy associated with type 2 diabetes mellitus E11.43    Respiratory failure requiring intubation J96.90    Acute kidney injury N17.9    Bradycardia R00.1    Seizure-like activity (HCC) R56.9    Hyperkalemia E87.5    Action tremor G25.2    Toxic metabolic encephalopathy G92.8       Isolation/Infection:   Isolation            Contact

## 2025-04-04 NOTE — CARE COORDINATION
Social Work-[facility] will transport to [facility] tomorrow at 2PM. Orders will need to be faxed to [phone]. HENS will need to be completed. Updated daughter that patient may leave at 2 Saturday. Please notify daughter tomorrow that dc is confirmed. JHeningFrancisco J

## 2025-04-04 NOTE — PROGRESS NOTES
NEUROLOGY INPATIENT PROGRESS NOTE    4/4/2025         Aracelis Jimenez is a  74 y.o. female admitted on 3/28/2025 with  Hyperkalemia [E87.5]  Bradycardia [R00.1]  Acute kidney injury [N17.9]  Respiratory failure requiring intubation [J96.90]    Reason for consult: Acute metabolic encephalopathy and not following commands off sedation on ventilator   History is obtained mostly from the medical record and from patient's daughter at bedside and the caregivers. Chart is reviewed and patient is examined.   Briefly, this is a  74 y.o. female with hx of HTN, diabetes, CKD and prior cva and hx of tremors was admitted on 3/28/2025 with bradycardia and altered mentation.  Patient was sitting on the toilet and having difficulty having a bowel movement.  As per patient's family; her antihypertensive medications were recently adjusted and then onwards patient was feeling tired and lightheaded over the past 2 weeks.   On arrival by EMS, patient was found to be bradycardic and hypotensive.  Patient was given atropine without improvement; then she was given Versed and transcutaneous pacing was started.  On arrival to ER, patient's mentation decreased and patient is unable to protect her airway therefore was intubated.  Patient has been on Versed drip.  This early morning patient has seizure-like activity lasting for about 1 minute.  Caregivers witnessed that episode and described it as shaking/tremoring of both upper extremities with eyes staring and fixed for \"less than 1 minute\".  Vitals at that time were 155/85; pulse 101/min.  Patient was on Versed drip.  Patient does not have any history of seizures.  Of note; patient was following up at [facility] neurology; patient has several year hx of tremors involving bilateral upper extremities for which she was on primidone and it was discontinued as it was ineffective and caused patient to be drowsy. Patient's dad also has hx of tremors.  Patient was on gabapentin \"for a while\" to help  for diabetic neuropathic pain relief but it was discontinued due to comorbid CKD. Later her antihypertensives were adjusted as stated above.  Patient's daughter also stated that patient has mild cognitive impairment;  but she is independent of all basic ADLs and most of instrumental ADLs.   4/2/2025: Chart reviewed and discussed with caregivers.  Patient has not had any recurrence of seizure activity.  Patient was extubated but has been on Precedex last night.  Patient's nurse also stated that patient has not had any recurrence of seizure activity and has been doing well on smallest dose of Precedex.  Patient is presently sleepy but arousable and following commands.  4/3/2025: Chart reviewed and discussed with caregivers.  Patient without any recurrence of seizure activity.  But was noted to be with right-sided tremors.  Patient had brain MRI with contrast yesterday and personally reviewed and it was without any acute intracranial abnormalities.  EEG is pending.  Also having problems with sleep and also has anxiety disorder; patient's daughter is wondering about it also.   4/4/2025: Chart reviewed and discussed with caregivers.  Patient's daughter at bedside.  Patient was started on small dose of Topamax during daytime and clonazepam nightly for ongoing anxiety/insomnia and tremors.  Patient has noticed some improvement but has not had any adverse effects from those meds.        No current facility-administered medications on file prior to encounter.     Current Outpatient Medications on File Prior to Encounter   Medication Sig Dispense Refill    amLODIPine (NORVASC) 5 MG tablet Take 1 tablet by mouth daily      HUMALOG KWIKPEN 100 UNIT/ML SOPN Inject 6 Units into the skin 3 times daily (with meals)      lisinopril-hydroCHLOROthiazide (PRINZIDE;ZESTORETIC) 10-12.5 MG per tablet Take 1 tablet by mouth daily      metoprolol succinate (TOPROL XL) 100 MG extended release tablet Take 1 tablet by mouth daily       action tremors; not parkinsonian tremors; unable to tolerate Primidone in the past. Unable to increase the dose of Gabapentin to help for tremor control.  Was started on smaller dose of Topamax during day time and tolerating it well so far.  To continue the same.  Comorbid insomnia and anxiety disorder: to start her on clonazepam nightly.    Insomnia; chronic anxiety disorder; to start her on clonazepam as it helps for both.   Care plan is d/w patient and her daughter at bedside.  Will follow with you.         This note was partially created using voice recognition software and is inherently subject to errors including those of syntax and \"sound alike\" substitutions which may escape proofreading.  In such instances, original meaning may be extrapolated by contextual derivation.  Barbara Perez MD 4/4/2025 9:31 AM

## 2025-04-04 NOTE — DISCHARGE INSTR - DIET
Good nutrition is important when healing from an illness, injury, or surgery.  Follow any nutrition recommendations given to you during your hospital stay.   If you were given an oral nutrition supplement while in the hospital, continue to take this supplement at home.  You can take it with meals, in-between meals, and/or before bedtime. These supplements can be purchased at most local grocery stores, pharmacies, and chain [employer]-stores.   If you have any questions about your diet or nutrition, call the hospital and ask for the dietitian.     [facility] 20/on unit

## 2025-04-04 NOTE — PROGRESS NOTES
[facility]  Office: [phone]  Ulises Yoo DO, Glenn Patterson DO, Fernando Wilson DO, Reji Gagnon DO, Radha Grayson MD, Shanell Denney MD, Juanis Zaragoza MD, Carmela Jennings MD,  Kali Harp MD, Mitra Willson MD, Alex Adair MD,  Pamela Hodge DO, Ar Pizano MD, Jaylen Sepulveda MD, Raoul Yoo DO, Katina Deras MD,  Lawrence Davila DO, Belinda Whalen MD, Crystal Rai MD, Lance Spence MD,  Wilson Granger MD, Dom Rogers MD, Bhavana Wyatt MD, Kylah Richmond MD, Humphrey Ureña MD, Guille Garcia MD, Pedro Hernandez DO, Yasir Parker MD, Pamela Patterson MD, Mohsin Reza, MD, Carlie Cohen MD, Shirley Waterhouse, CNP,  Sridevi Muñoz, CNP, Pedro Kuhn, CNP,  Maria G Norman, DNP, Edelmira Oviedo, CNP, Niki Hernandez, CNP, Penelope Reyes, CNP, Shiela Oropeza, CNP, Doris Tolliver, PA-C, Екатерина Thomas, CNP, Nena Baltazar, CNP,  Iliana Rivero, CNP, Simona Foley, CNP, Ramin Villegas, PA-C, Jimena Castillo, CNP,  Veronica Hogue, CNS, Miladys Matamoros, CNP, Kirsty Blanco, CNP,   Rosaura Wolff, CNP         [facility]   IN-PATIENT SERVICE   [facility]    Progress Note    4/4/2025    9:48 AM    Name:   Aracelis Jimenez  MRN:     [id number]     Acct:      [id number]   Room:   2027/2027-01  IP Day:  7  Admit Date:  3/28/2025  2:47 AM    PCP:   Francisca Daniels MD  Code Status:  Full Code    Subjective:     C/C:   Chief Complaint   Patient presents with    Altered Mental Status     Interval History Status: improved.     Patient seen examined sitting in chair.  Tremors better. Family at bedside. Heart rate slightly elevated BP improved.  Still having some difficulty sleeping.    Brief History:     74-year-old female past medical history of hypertension, diabetes type 2, hypothyroidism, CKD stage IIIa, depression, prior history of stroke (presented to the ER due to having altered mental status and having abdominal pain.  Patient was intubated in the ER due to airway  and dry.      Coloration: Skin is pale.   Neurological:      Mental Status: She is alert. She is disoriented.      Comments: Tremors in bilateral upper extremities improving compared to yesterday.  Resting tremor in right hand not as noticeable.  Is worsened when attention is brought to it   Psychiatric:         Mood and Affect: Mood normal.         Behavior: Behavior normal.         Assessment:        Hospital Problems           Last Modified POA    * (Principal) Respiratory failure requiring intubation 3/28/2025 Yes    Acute kidney injury 3/28/2025 Yes    Primary hypertension 3/28/2025 Yes    Type 2 diabetes mellitus with hyperglycemia, with long-term current use of insulin (HCC) 3/28/2025 Yes    Hypothyroidism 3/28/2025 Yes    CKD stage 3 due to type 2 diabetes mellitus ([facility]) 3/28/2025 Yes    Bradycardia 4/3/2025 Yes    Seizure-like activity (HCC) 4/1/2025 Yes    Hyperkalemia 4/1/2025 Yes    Action tremor 4/1/2025 Yes    Toxic metabolic encephalopathy 4/3/2025 Yes       Plan:        Severe vagal episode causing symptomatic bradycardia with complication of prior beta-blocker usage and ALPHONSE suspected secondary to poor oral intake.  Appreciate cardiology recommendations.  Losartan, Toprol-XL reduced dose.  Monitor heart rate  Possible aspiration pneumonia.  Has completed 7 days of antibiotics.  HLD. Lipitor  Discussed with patient and family about swallow study.  Discussed about dysphagia diet. Patient having poor oral intake due to not liking many of her food options.  Will liberalize carb restricted diet.  Discussed with patient oral nutritional supplements including fortified gel zinc, high-calorie, as well as 4O's and oral supplements.  Patient does not seem as interested in fortified gel to oral supplements but she will try it, present oral supplements also ordered.  Insomnia.  Trazodone and clonazepam  Incentive spirometry, hold on Lasix today as patient is having poor oral intake  Tremors.  Appreciate

## 2025-04-04 NOTE — CARE COORDINATION
Social Work-Telesitter was dc this morning. [facility] can admit tomorrow. Auth received. Will arrange transportation for tomorrow. [facility]yn is requesting afternoon admission. [place]

## 2025-04-04 NOTE — PROGRESS NOTES
Physical Therapy  Facility/Department: [facility] CVICU  Daily Treatment Note  NAME: Aracelis Jimenez  : 1950  MRN: [id number]    Date of Service: 2025    Discharge Recommendations:  Patient would benefit from continued therapy after discharge    Pt currently functioning below baseline.  Recommend daily inpatient skilled therapy at time of discharge to maximize long term outcomes and prevent re-admission. Please refer to AM-PAC score for current level of function.     Patient Diagnosis(es): The primary encounter diagnosis was Acute kidney injury. Diagnoses of Hyperkalemia, Bradycardia, Toxic metabolic encephalopathy, Action tremor, and Seizure-like activity (HCC) were also pertinent to this visit.    Assessment  Assessment: Patient required MIN-MOD x 2 A for STS from recliner and then MIN x 1 A for gait into the rest room. VCs for safety awareness, DEP for line management, SpO2 WNLs. Valentino is below her baseline as she lives along and would benefit from continued skilled PT services.  Activity Tolerance: Patient limited by endurance;Patient limited by fatigue    Plan  Physical Therapy Plan  General Plan: 5-7 times per week  Current Treatment Recommendations: Strengthening;ROM;Balance training;Functional mobility training;Neuromuscular re-education;Home exercise program;Endurance training;Pain management;Safety education & training;Patient/Caregiver education & training;Therapeutic activities;Positioning;Co-Treatment;Transfer training;Gait training;Equipment evaluation, education, & procurement    Restrictions  Restrictions/Precautions  Restrictions/Precautions: General Precautions, Fall Risk, Contact Precautions  Activity Level:  (Activity as tolerated)  Required Braces or Orthoses?: No  Position Activity Restriction  Other Position/Activity Restrictions: NC O2, continuous pulse ox.     Subjective   Subjective  Subjective: Patient up in chair upon arrival and agreeable for PT treatment. BERENICE Nathan reported patient

## 2025-04-04 NOTE — PROGRESS NOTES
Nutrition Assessment     Type and Reason for Visit: Reassess    Nutrition Recommendations/Plan:   Continue current diet  Monitor po inake, labs, and GI status     Malnutrition Assessment:  Malnutrition Status: At risk for malnutrition    Nutrition Assessment:  Pt is now off TF. Pt was extubated and has been off sedation since 4/1. Pt on CPAP and tolerating. Diet has advanced to soft and bite size with honey thick liquids. She has had 2 loose BM this morning. Pt still had lunch tray during visit. She ate very little of her food. Pt ate most of her italian ice. Pt had family at beside, they report that pt states nothing tastes good. Family states they have been encouraging pt to eat more, but she just says nothing tastes good. RD offered differnt meal options but pt declined. Pt stated she was experiencing diarrhea. Monitor po inake, labs, and GI status.    Estimated Daily Nutrient Needs:  Energy (kcal):  1472 kcal ([facility] 2010) Weight Used for Energy Requirements: Current     Protein (g):  70-85 gm of protein (1.4-1.7 gm/kg) Weight Used for Protein Requirements: Ideal        Fluid (ml/day):  1472 mL Method Used for Fluid Requirements: 1 ml/kcal    Nutrition Related Findings:   Active bowel sounds, cramping. General trace edema. Wound Type: None    Current Nutrition Therapies:    ADULT DIET; Dysphagia - Soft and Bite Sized; Moderately Thick (Honey)  ADULT ORAL NUTRITION SUPPLEMENT; Breakfast, Lunch, Dinner; Frozen Oral Supplement    Anthropometric Measures:  Height: 157.5 cm (5' 2\")  Current Body Wt: 88 kg (194 lb 0.1 oz)   BMI: 35.5        Nutrition Diagnosis:   Inadequate oral intake related to decreased appetite as evidenced by altered taste perception, intake 0-25%    Nutrition Interventions:   Food and/or Nutrient Delivery: Continue Current Diet, Discontinue Tube Feeding  Nutrition Education/Counseling: Education/Counseling not indicated  Coordination of Nutrition Care: Continue to monitor while inpatient

## 2025-04-04 NOTE — PROGRESS NOTES
Pulmonary Critical Care Progress Note    Patient seen for the follow up of Respiratory failure requiring intubation     Subjective:    She still oxygen 3 L.  She is improved much more awake still lethargic .  Poor appetite.  Shortness of breath improved.  Heart rate improved.  No chest pain.    Examination:    Vitals: BP (!) 145/71   Pulse 99   Temp 98.4 °F (36.9 °C) (Oral)   Resp 18   Ht 1.575 m (5' 2\")   Wt 90.1 kg (198 lb 9.6 oz)   SpO2 98%   BMI 36.32 kg/m²   SpO2  Av.6 %  Min: 94 %  Max: 98 %  General appearance: Awake in no acute distress  Neck: No JVD  Lungs: Decreased breath sound no crackles or wheezing  Heart: regular rate and rhythm, S1, S2 normal, no gallop  Abdomen: Soft, non tender, + BS  Extremities: no cyanosis or clubbing. No significant edema    LABs:    CBC:   Recent Labs     25  0554 25  0312   WBC 7.6 6.9   HGB 11.1* 11.0*   HCT 33.2* 33.8*    167     BMP:   Recent Labs     25  0554 25  0312    139   K 3.6* 3.8   CO2 22 22   BUN 17 23   CREATININE 1.1* 1.3*   LABGLOM 55* 44*   GLUCOSE 203* 263*      Latest Reference Range & Units 25 08:27   POC HCO3 21.0 - 28.0 mmol/L 21.5   POC O2 SAT 94.0 - 98.0 % 92.9 (L)   POC pCO2 35.0 - 48.0 mm Hg 42.9   POC pH 7.350 - 7.450  7.308 (L)   POC PO2 83.0 - 108.0 mm Hg 73.0 (L)   (L): Data is abnormally low   Latest Reference Range & Units 25 05:45 25 08:51 25 03:37   Procalcitonin 0.00 - 0.09 ng/mL 0.58 (H) 0.32 (H) 0.25 (H)   (H): Data is abnormally high    Radiology:    Chest x-ray   Slightly decreased perihilar opacities. Otherwise no significant change.       Chest x-ray 4/3 reviewed  Perihilar infiltrates that are improved compared to prior.     Trace effusions.    Chest x-ray     Removal of ET tube and enteric tube.     Perihilar airspace disease suggesting central pulmonary edema. Small right  pleural effusion.         Chest x-ray 3/31 reviewed  1. Endotracheal tube tip is

## 2025-04-04 NOTE — PLAN OF CARE
Problem: Safety - Adult  Goal: Free from fall injury  4/4/2025 1149 by Venita Uribe RN  Outcome: Progressing  4/3/2025 2241 by Lilly Giraldo RN  Outcome: Progressing     Problem: Discharge Planning  Goal: Discharge to home or other facility with appropriate resources  4/4/2025 1149 by Venita Uribe RN  Outcome: Progressing  4/3/2025 2241 by Lilly Giraldo, RN  Outcome: Progressing  Flowsheets (Taken 4/3/2025 2000)  Discharge to home or other facility with appropriate resources:   Identify barriers to discharge with patient and caregiver   Identify discharge learning needs (meds, wound care, etc)     Problem: Skin/Tissue Integrity  Goal: Skin integrity remains intact  Description: 1.  Monitor for areas of redness and/or skin breakdown  2.  Assess vascular access sites hourly  3.  Every 4-6 hours minimum:  Change oxygen saturation probe site  4.  Every 4-6 hours:  If on nasal continuous positive airway pressure, respiratory therapy assess nares and determine need for appliance change or resting period  4/4/2025 1149 by Venita Uribe RN  Outcome: Progressing  4/3/2025 2241 by Lilly Giraldo RN  Outcome: Progressing  Flowsheets (Taken 4/3/2025 2000)  Skin Integrity Remains Intact:   Monitor for areas of redness and/or skin breakdown   Assess vascular access sites hourly   Every 4-6 hours minimum:  Change oxygen saturation probe site   Turn and reposition as indicated   Every 4-6 hours:  If on nasal continuous positive airway pressure, assess nares and determine need for appliance change or resting period   Assess need for specialty bed     Problem: ABCDS Injury Assessment  Goal: Absence of physical injury  4/4/2025 1149 by Venita Uribe, RN  Outcome: Progressing  4/3/2025 2241 by Lilly Giraldo RN  Outcome: Progressing     Problem: Neurosensory - Adult  Goal: Achieves stable or improved neurological status  4/4/2025 1149 by Venita Uribe RN  Outcome:  nutrition, hydration, elimination, sleep and activity  6. If unable to ensure safety without constant attention obtain sitter and review sitter guidelines with assigned personnel  7. Initiate Psychosocial CNS and Spiritual Care consult, as indicated  4/4/2025 1149 by Venita Uribe, RN  Outcome: Progressing  4/3/2025 2241 by Lilly Giraldo, RN  Outcome: Progressing  Flowsheets (Taken 4/3/2025 2000)  Effect of thought disturbance (confusion, delirium, dementia, or psychosis) are managed with adequate functional status:   Assess for contributors to thought disturbance, including medications, impaired vision or hearing, underlying metabolic abnormalities, dehydration, psychiatric diagnoses, notify LIP   [place] high risk fall precautions, as indicated   Decrease environmental stimuli, including noise as appropriate   Monitor and intervene to maintain adequate nutrition, hydration, elimination, sleep and activity   If unable to ensure safety without constant attention obtain sitter and review sitter guidelines with assigned personnel

## 2025-04-04 NOTE — PROGRESS NOTES
Physician Progress Note      PATIENT:               LADARIUS HORTON  CSN #:                  [id number]  :                       1950  ADMIT DATE:       3/28/2025 2:47 AM  DISCH DATE:  RESPONDING  PROVIDER #:        Alex SONG MD          QUERY TEXT:    Please clarify in documentation the relationship, if any, between  bradycardia and adverse reaction to metoprolol, sick sinus syndrome, DKA,   hyperkalemia, vasovagal episode, and/or ALPHONSE 2/2 poor intake. Are the   conditions:    The clinical indicators include:  * Per ED provider note, EMS was called secondary to abdominal pain.  Patient   was sitting on the toilet and having difficulty having a bowel movement.  On   arrival by EMS patient was found to be bradycardic and hypotensive.  Patient   had been given atropine without getting frequent improvement in her heart rate   and blood pressure.  Patient was then given Versed by EMS and transcutaneous   pacing was started.\"  *Per H&P, admitted for symptomatic bradycardia.  *Per EP consult, \"Junctional bradycardia, now resolved, due to DKA with   acidosis, hyperkalemia and recent increase in metoprolol dose.\"  *Per pulm consult note, \"Bradycardia ?  Related to beta-blocker/sick sinus   syndrome status post transvenous pacemaker\"  *Per 3/31 nephrology note, \"ALPHONSE likely due to ATN from volume depletion   related to osmotic diuresis from elevated blood sugars, bradycardia along with   hypotension along with ACE inhibitor and diuretic usage at home.\"  Per  IM note, \"Severe vagal episode causing symptomatic bradycardia with   complication of prior beta-blocker usage and ALPHONSE suspected secondary to poor   oral intake.\"  Per 4/3 query response, \"was treated for sepsis this admission, due to   pneumonia, which was present on admission and is currently resolved.\"  BP readings on 3/28: 78/30 @ 0346----- 71/36 @ 0352---- 89/61 @ 0354---   dopamine began @ 0425  HR readings on 3/28: range 34-60, then paced at

## 2025-04-04 NOTE — PLAN OF CARE
Problem: Safety - Adult  Goal: Free from fall injury  4/3/2025 2241 by Lilly Giraldo RN  Outcome: Progressing  4/3/2025 1752 by Poli Mello RN  Outcome: Progressing     Problem: Discharge Planning  Goal: Discharge to home or other facility with appropriate resources  4/3/2025 2241 by Lilly Giraldo RN  Outcome: Progressing  Flowsheets (Taken 4/3/2025 2000)  Discharge to home or other facility with appropriate resources:   Identify barriers to discharge with patient and caregiver   Identify discharge learning needs (meds, wound care, etc)  4/3/2025 1752 by Poli Mello RN  Outcome: Progressing     Problem: Skin/Tissue Integrity  Goal: Skin integrity remains intact  Description: 1.  Monitor for areas of redness and/or skin breakdown  2.  Assess vascular access sites hourly  3.  Every 4-6 hours minimum:  Change oxygen saturation probe site  4.  Every 4-6 hours:  If on nasal continuous positive airway pressure, respiratory therapy assess nares and determine need for appliance change or resting period  4/3/2025 2241 by Lilly Giraldo RN  Outcome: Progressing  Flowsheets (Taken 4/3/2025 2000)  Skin Integrity Remains Intact:   Monitor for areas of redness and/or skin breakdown   Assess vascular access sites hourly   Every 4-6 hours minimum:  Change oxygen saturation probe site   Turn and reposition as indicated   Every 4-6 hours:  If on nasal continuous positive airway pressure, assess nares and determine need for appliance change or resting period   Assess need for specialty bed  4/3/2025 1752 by Poli Mello RN  Outcome: Progressing     Problem: ABCDS Injury Assessment  Goal: Absence of physical injury  4/3/2025 2241 by Lilly Giraldo RN  Outcome: Progressing  4/3/2025 1752 by Poli Mello RN  Outcome: Progressing     Problem: Neurosensory - Adult  Goal: Achieves stable or improved neurological status  4/3/2025 2241 by Lilly Giraldo RN  Outcome: Progressing  Flowsheets  managed with adequate functional status:   Assess for contributors to thought disturbance, including medications, impaired vision or hearing, underlying metabolic abnormalities, dehydration, psychiatric diagnoses, notify LIP   [place] high risk fall precautions, as indicated   Decrease environmental stimuli, including noise as appropriate   Monitor and intervene to maintain adequate nutrition, hydration, elimination, sleep and activity   If unable to ensure safety without constant attention obtain sitter and review sitter guidelines with assigned personnel  4/3/2025 1752 by Poli Mello, RN  Outcome: Progressing     Problem: Genitourinary - Adult  Goal: Urinary catheter remains patent  4/3/2025 2241 by Lilly Giraldo, RN  Outcome: Completed  4/3/2025 1752 by Poli Mello, RN  Outcome: Progressing

## 2025-04-04 NOTE — PROGRESS NOTES
Occupational Therapy  Facility/Department: [facility] CVU  Daily Treatment Note  NAME: Aracelis Jimenez  : 1950  MRN: [id number]    Date of Service: 2025    RN reports patient is medically stable for therapy treatment this date.    Chart reviewed prior to treatment and patient is agreeable for therapy.  All lines intact and patient positioned comfortably at end of treatment.  All patient needs addressed prior to ending therapy session.      Discharge Recommendations:  Patient would benefit from continued therapy after discharge  Pt currently functioning below baseline.  Recommend daily inpatient skilled therapy at time of discharge to maximize long term outcomes and prevent re-admission. Please refer to AM-PAC score for current level of function.  OT Equipment Recommendations  Equipment Needed: Yes  Mobility Devices: ADL Assistive Devices  ADL Assistive Devices: Emergency Alert System;Long-handled Shoe Horn;Long-handled Sponge;Reacher;Sock-Aid Hard      Patient Diagnosis(es): The primary encounter diagnosis was Acute kidney injury. Diagnoses of Hyperkalemia, Bradycardia, Toxic metabolic encephalopathy, Action tremor, and Seizure-like activity (HCC) were also pertinent to this visit.     Assessment   Assessment: Pt tolerated session fair this date. Pt needing min-mod Ax2 for STS from bedside chair with RW. Pt ambulating with min A with RW from bedside chair to bathroom sink and back. Pt standing at sink with RW and forearm prop on sink to brush teeth with CGA ~4 mintues with HR elevated to 120 bmp and SpO2 WNL at 96-97%. Pt reporting needing to return to seated to complete brushing hair d/t fatigue. Pt would benefit from contined OT to increase indep with ADLs/IADLs for d/c.  Activity Tolerance: Patient limited by endurance;Patient limited by fatigue  Discharge Recommendations: Patient would benefit from continued therapy after discharge  Equipment Needed: Yes  Mobility Devices: ADL Assistive Devices      Plan  Occupational Therapy Plan  Times Per Week: 4-5x/wk 1x/day as yeni  Current Treatment Recommendations: Strengthening;Balance training;Endurance training;Safety education & training;Self-Care / ADL;Equipment evaluation, education, & procurement;Patient/Caregiver education & training;Cognitive reorientation;Cognitive/Perceptual training;Co-Treatment;Positioning    Restrictions  Restrictions/Precautions  Restrictions/Precautions: General Precautions;Fall Risk;Contact Precautions  Activity Level:  (Activity as tolerated)  Required Braces or Orthoses?: No  Position Activity Restriction  Other Position/Activity Restrictions: NC O2, continuous pulse ox.    Subjective  Subjective  Subjective: Pt in bedside chair prior. Pt receptive to session. RN approved session.  Pain: Denies  Orientation  Overall Orientation Status: Within Functional Limits  Orientation Level: Oriented to person;Oriented to place  Cognition  Overall Cognitive Status: Exceptions  Arousal/Alertness: Appears intact  Following Commands: Follows one step commands with increased time  Attention Span: Appears intact  Safety Judgement: Decreased awareness of need for assistance;Decreased awareness of need for safety  Problem Solving: Decreased awareness of errors;Assistance required to correct errors made;Assistance required to identify errors made;Assistance required to generate solutions;Assistance required to implement solutions  Insights: Decreased awareness of deficits  Initiation: Requires cues for some  Sequencing: Requires cues for some  Cognition Comment: Pt needing encouragement to participate in session this date        Bed Mobility Training  Bed Mobility Training: No (Pt in bedside chair prior to and following session.)    Balance  Sitting: High guard (Sitting in bedside chair prior to and following session.)  Standing: With support (Standing at sink to brush teeth with RW and forearm prop on counter with CGA for safety.)    Transfer

## 2025-04-05 VITALS
BODY MASS INDEX: 35.22 KG/M2 | HEART RATE: 84 BPM | OXYGEN SATURATION: 96 % | DIASTOLIC BLOOD PRESSURE: 56 MMHG | TEMPERATURE: 97.8 F | SYSTOLIC BLOOD PRESSURE: 152 MMHG | HEIGHT: 62 IN | WEIGHT: 191.4 LBS | RESPIRATION RATE: 13 BRPM

## 2025-04-05 LAB
ANION GAP SERPL CALCULATED.3IONS-SCNC: 11 MMOL/L (ref 9–16)
BUN SERPL-MCNC: 28 MG/DL (ref 8–23)
CALCIUM SERPL-MCNC: 8.8 MG/DL (ref 8.8–10.2)
CHLORIDE SERPL-SCNC: 101 MMOL/L (ref 98–107)
CO2 SERPL-SCNC: 26 MMOL/L (ref 20–31)
CREAT SERPL-MCNC: 1.8 MG/DL (ref 0.5–0.9)
ERYTHROCYTE [DISTWIDTH] IN BLOOD BY AUTOMATED COUNT: 13.4 % (ref 11.8–14.4)
GFR, ESTIMATED: 30 ML/MIN/1.73M2
GLUCOSE BLD-MCNC: 207 MG/DL (ref 65–105)
GLUCOSE SERPL-MCNC: 215 MG/DL (ref 82–115)
HCT VFR BLD AUTO: 32.6 % (ref 36.3–47.1)
HGB BLD-MCNC: 10.6 G/DL (ref 11.9–15.1)
MAGNESIUM SERPL-MCNC: 2 MG/DL (ref 1.6–2.4)
MCH RBC QN AUTO: 30.4 PG (ref 25.2–33.5)
MCHC RBC AUTO-ENTMCNC: 32.5 G/DL (ref 28.4–34.8)
MCV RBC AUTO: 93.4 FL (ref 82.6–102.9)
NRBC BLD-RTO: 0 PER 100 WBC
PLATELET # BLD AUTO: 162 K/UL (ref 138–453)
PMV BLD AUTO: 9.7 FL (ref 8.1–13.5)
POTASSIUM SERPL-SCNC: 3.5 MMOL/L (ref 3.7–5.3)
RBC # BLD AUTO: 3.49 M/UL (ref 3.95–5.11)
SODIUM SERPL-SCNC: 138 MMOL/L (ref 136–145)
WBC OTHER # BLD: 8.4 K/UL (ref 3.5–11.3)

## 2025-04-05 PROCEDURE — 2500000003 HC RX 250 WO HCPCS: Performed by: INTERNAL MEDICINE

## 2025-04-05 PROCEDURE — 99231 SBSQ HOSP IP/OBS SF/LOW 25: CPT | Performed by: PSYCHIATRY & NEUROLOGY

## 2025-04-05 PROCEDURE — 99233 SBSQ HOSP IP/OBS HIGH 50: CPT | Performed by: NURSE PRACTITIONER

## 2025-04-05 PROCEDURE — 6360000002 HC RX W HCPCS

## 2025-04-05 PROCEDURE — 85027 COMPLETE CBC AUTOMATED: CPT

## 2025-04-05 PROCEDURE — 36415 COLL VENOUS BLD VENIPUNCTURE: CPT

## 2025-04-05 PROCEDURE — 2700000000 HC OXYGEN THERAPY PER DAY

## 2025-04-05 PROCEDURE — 82947 ASSAY GLUCOSE BLOOD QUANT: CPT

## 2025-04-05 PROCEDURE — 6370000000 HC RX 637 (ALT 250 FOR IP): Performed by: STUDENT IN AN ORGANIZED HEALTH CARE EDUCATION/TRAINING PROGRAM

## 2025-04-05 PROCEDURE — 83735 ASSAY OF MAGNESIUM: CPT

## 2025-04-05 PROCEDURE — 94761 N-INVAS EAR/PLS OXIMETRY MLT: CPT

## 2025-04-05 PROCEDURE — 6370000000 HC RX 637 (ALT 250 FOR IP): Performed by: NURSE PRACTITIONER

## 2025-04-05 PROCEDURE — 80048 BASIC METABOLIC PNL TOTAL CA: CPT

## 2025-04-05 RX ORDER — POTASSIUM CHLORIDE 1500 MG/1
40 TABLET, EXTENDED RELEASE ORAL PRN
Status: DISCONTINUED | OUTPATIENT
Start: 2025-04-05 | End: 2025-04-05 | Stop reason: HOSPADM

## 2025-04-05 RX ORDER — POTASSIUM CHLORIDE 7.45 MG/ML
10 INJECTION INTRAVENOUS PRN
Status: DISCONTINUED | OUTPATIENT
Start: 2025-04-05 | End: 2025-04-05 | Stop reason: HOSPADM

## 2025-04-05 RX ADMIN — LOSARTAN POTASSIUM 50 MG: 50 TABLET, FILM COATED ORAL at 08:37

## 2025-04-05 RX ADMIN — LANSOPRAZOLE 30 MG: 15 TABLET, ORALLY DISINTEGRATING ORAL at 05:49

## 2025-04-05 RX ADMIN — METOPROLOL SUCCINATE 50 MG: 50 TABLET, EXTENDED RELEASE ORAL at 08:37

## 2025-04-05 RX ADMIN — ATORVASTATIN CALCIUM 40 MG: 40 TABLET, FILM COATED ORAL at 08:37

## 2025-04-05 RX ADMIN — SODIUM CHLORIDE, PRESERVATIVE FREE 10 ML: 5 INJECTION INTRAVENOUS at 08:37

## 2025-04-05 RX ADMIN — HEPARIN SODIUM 5000 UNITS: 5000 INJECTION INTRAVENOUS; SUBCUTANEOUS at 05:50

## 2025-04-05 RX ADMIN — TOPIRAMATE 25 MG: 25 TABLET, FILM COATED ORAL at 08:37

## 2025-04-05 RX ADMIN — LEVOTHYROXINE SODIUM 75 MCG: 0.07 TABLET ORAL at 05:50

## 2025-04-05 RX ADMIN — POTASSIUM BICARBONATE 40 MEQ: 782 TABLET, EFFERVESCENT ORAL at 09:25

## 2025-04-05 RX ADMIN — INSULIN LISPRO 2 UNITS: 100 INJECTION, SOLUTION INTRAVENOUS; SUBCUTANEOUS at 08:37

## 2025-04-05 NOTE — PLAN OF CARE
Problem: Safety - Adult  Goal: Free from fall injury  4/5/2025 0919 by Becky Rivero RN  Outcome: Adequate for Discharge  4/4/2025 2334 by Ching Posey RN  Outcome: Progressing  Flowsheets (Taken 4/4/2025 2206 by Ángel Crouch)  Free From Fall Injury:   Instruct family/caregiver on patient safety   Based on caregiver fall risk screen, instruct family/caregiver to ask for assistance with transferring infant if caregiver noted to have fall risk factors     Problem: Discharge Planning  Goal: Discharge to home or other facility with appropriate resources  4/5/2025 0919 by Becky Rivero RN  Outcome: Adequate for Discharge  4/4/2025 2334 by Ching Posey RN  Outcome: Progressing  Flowsheets (Taken 4/4/2025 2206 by Ángel Crouch)  Discharge to home or other facility with appropriate resources:   Identify barriers to discharge with patient and caregiver   Arrange for needed discharge resources and transportation as appropriate   Identify discharge learning needs (meds, wound care, etc)   Refer to discharge planning if patient needs post-hospital services based on physician order or complex needs related to functional status, cognitive ability or social support system     Problem: Skin/Tissue Integrity  Goal: Skin integrity remains intact  Description: 1.  Monitor for areas of redness and/or skin breakdown  2.  Assess vascular access sites hourly  3.  Every 4-6 hours minimum:  Change oxygen saturation probe site  4.  Every 4-6 hours:  If on nasal continuous positive airway pressure, respiratory therapy assess nares and determine need for appliance change or resting period  4/5/2025 0919 by Becky Rivero RN  Outcome: Adequate for Discharge  Flowsheets (Taken 4/5/2025 0840)  Skin Integrity Remains Intact: Monitor for areas of redness and/or skin breakdown  4/4/2025 2334 by Ching Posey RN  Outcome: Progressing  Flowsheets (Taken 4/4/2025 2206 by Ángel Crouch)  Skin Integrity Remains Intact:   Monitor  and hemodynamic stability  4/5/2025 0919 by Becky Rivero RN  Outcome: Adequate for Discharge  4/4/2025 2334 by Ching Posey RN  Outcome: Progressing  Flowsheets (Taken 4/4/2025 2206 by Ángel Crouch)  Maintains optimal cardiac output and hemodynamic stability:   Monitor blood pressure and heart rate   Monitor urine output and notify Licensed Independent Practitioner for values outside of normal range   Assess for signs of decreased cardiac output   Administer fluid and/or volume expanders as ordered  Goal: Absence of cardiac dysrhythmias or at baseline  4/5/2025 0919 by Becky Rivero RN  Outcome: Adequate for Discharge  4/4/2025 2334 by Ching Posey RN  Outcome: Progressing  Flowsheets (Taken 4/4/2025 2206 by Ángel Crouch)  Absence of cardiac dysrhythmias or at baseline:   Monitor cardiac rate and rhythm   Assess for signs of decreased cardiac output     Problem: Musculoskeletal - Adult  Goal: Return mobility to safest level of function  4/5/2025 0919 by Becky Rivero RN  Outcome: Adequate for Discharge  Flowsheets (Taken 4/5/2025 0840)  Return Mobility to Safest Level of Function: Assess patient stability and activity tolerance for standing, transferring and ambulating with or without assistive devices  4/4/2025 2334 by Ching Posey RN  Outcome: Progressing  Flowsheets (Taken 4/4/2025 2206 by Ángel Crouch)  Return Mobility to Safest Level of Function:   Assess patient stability and activity tolerance for standing, transferring and ambulating with or without assistive devices   Assist with transfers and ambulation using safe patient handling equipment as needed     Problem: Gastrointestinal - Adult  Goal: Maintains adequate nutritional intake  4/5/2025 0919 by Becky Rivero RN  Outcome: Adequate for Discharge  4/4/2025 2334 by Ching Posey RN  Outcome: Progressing  Flowsheets (Taken 4/4/2025 2206 by Ángel Crouch)  Maintains adequate nutritional intake:   Monitor percentage of each  meal consumed   Identify factors contributing to decreased intake, treat as appropriate   Assist with meals as needed   Monitor intake and output, weight and lab values     Problem: Metabolic/Fluid and Electrolytes - Adult  Goal: Electrolytes maintained within normal limits  4/5/2025 0919 by Becky Rivero RN  Outcome: Adequate for Discharge  Flowsheets (Taken 4/5/2025 0840)  Electrolytes maintained within normal limits: Monitor labs and assess patient for signs and symptoms of electrolyte imbalances  4/4/2025 2334 by Ching Posey RN  Outcome: Progressing  Flowsheets (Taken 4/4/2025 2206 by Ángel Crouch)  Electrolytes maintained within normal limits: Monitor labs and assess patient for signs and symptoms of electrolyte imbalances  Goal: Hemodynamic stability and optimal renal function maintained  4/5/2025 0919 by Becky Rivero RN  Outcome: Adequate for Discharge  Flowsheets (Taken 4/5/2025 0840)  Hemodynamic stability and optimal renal function maintained: Monitor labs and assess for signs and symptoms of volume excess or deficit  4/4/2025 2334 by Ching Posey RN  Outcome: Progressing  Flowsheets (Taken 4/4/2025 2206 by Ángel Crouch)  Hemodynamic stability and optimal renal function maintained:   Monitor labs and assess for signs and symptoms of volume excess or deficit   Monitor intake, output and patient weight   Monitor urine specific gravity, serum osmolarity and serum sodium as indicated or ordered   Monitor response to interventions for patient's volume status, including labs, urine output, blood pressure (other measures as available)   Encourage oral intake as appropriate   Instruct patient on fluid and nutrition restrictions as appropriate  Goal: Glucose maintained within prescribed range  4/5/2025 0919 by Becky Rivero RN  Outcome: Adequate for Discharge  4/4/2025 2334 by Ching Posey RN  Outcome: Progressing     Problem: Pain  Goal: Verbalizes/displays adequate comfort level or baseline

## 2025-04-05 NOTE — PLAN OF CARE
Injury Assessment  Goal: Absence of physical injury  4/4/2025 2334 by Ching Posey RN  Outcome: Progressing  Flowsheets (Taken 4/4/2025 2206 by Ángel Crouch)  Absence of Physical Injury: Implement safety measures based on patient assessment  4/4/2025 1149 by Venita Uribe RN  Outcome: Progressing     Problem: Neurosensory - Adult  Goal: Achieves stable or improved neurological status  4/4/2025 2334 by Ching Posey RN  Outcome: Progressing  Flowsheets (Taken 4/4/2025 2206 by Ángel Crouch)  Achieves stable or improved neurological status:   Assess for and report changes in neurological status   Maintain blood pressure and fluid volume within ordered parameters to optimize cerebral perfusion and minimize risk of hemorrhage   Monitor temperature, glucose, and sodium. Initiate appropriate interventions as ordered  4/4/2025 1149 by Venita Uribe RN  Outcome: Progressing     Problem: Respiratory - Adult  Goal: Achieves optimal ventilation and oxygenation  4/4/2025 2334 by Ching Posey RN  Outcome: Progressing  Flowsheets (Taken 4/4/2025 2206 by Ángel Crouch)  Achieves optimal ventilation and oxygenation:   Assess for changes in respiratory status   Assess for changes in mentation and behavior   Position to facilitate oxygenation and minimize respiratory effort   Oxygen supplementation based on oxygen saturation or arterial blood gases   Encourage broncho-pulmonary hygiene including cough, deep breathe, incentive spirometry   Assess the need for suctioning and aspirate as needed   Assess and instruct to report shortness of breath or any respiratory difficulty   Respiratory therapy support as indicated  4/4/2025 1149 by Venita Uribe RN  Outcome: Progressing     Problem: Cardiovascular - Adult  Goal: Maintains optimal cardiac output and hemodynamic stability  4/4/2025 2334 by Ching Posey RN  Outcome: Progressing  Flowsheets (Taken 4/4/2025 2206 by Ángel Crouch)  Maintains  Ángel)  Electrolytes maintained within normal limits: Monitor labs and assess patient for signs and symptoms of electrolyte imbalances  4/4/2025 1149 by Venita Uribe RN  Outcome: Progressing  Goal: Hemodynamic stability and optimal renal function maintained  4/4/2025 2334 by Ching Posey RN  Outcome: Progressing  Flowsheets (Taken 4/4/2025 2206 by Ángel Crouch)  Hemodynamic stability and optimal renal function maintained:   Monitor labs and assess for signs and symptoms of volume excess or deficit   Monitor intake, output and patient weight   Monitor urine specific gravity, serum osmolarity and serum sodium as indicated or ordered   Monitor response to interventions for patient's volume status, including labs, urine output, blood pressure (other measures as available)   Encourage oral intake as appropriate   Instruct patient on fluid and nutrition restrictions as appropriate  4/4/2025 1149 by Venita Uribe RN  Outcome: Progressing  Goal: Glucose maintained within prescribed range  4/4/2025 2334 by Ching Posey RN  Outcome: Progressing  4/4/2025 1149 by Venita Uribe RN  Outcome: Progressing     Problem: Pain  Goal: Verbalizes/displays adequate comfort level or baseline comfort level  4/4/2025 2334 by Ching Posey RN  Outcome: Progressing  4/4/2025 1149 by Venita Uribe RN  Outcome: Progressing     Problem: Nutrition Deficit:  Goal: Optimize nutritional status  4/4/2025 2334 by Ching Posey RN  Outcome: Progressing  4/4/2025 1149 by Venita Uribe RN  Outcome: Progressing  Flowsheets (Taken 4/4/2025 1010 by Jacqueline Alcantara)  Nutrient intake appropriate for improving, restoring, or maintaining nutritional needs:   Assess nutritional status and recommend course of action   Monitor oral intake, labs, and treatment plans   Recommend appropriate diets, oral nutritional supplements, and vitamin/mineral supplements     Problem: Confusion  Goal: Confusion, delirium, dementia, or  psychosis is improved or at baseline  Description: INTERVENTIONS:  1. Assess for possible contributors to thought disturbance, including medications, impaired vision or hearing, underlying metabolic abnormalities, dehydration, psychiatric diagnoses, and notify attending LIP  2. [place] high risk fall precautions, as indicated  3. Provide frequent short contacts to provide reality reorientation, refocusing and direction  4. Decrease environmental stimuli, including noise as appropriate  5. Monitor and intervene to maintain adequate nutrition, hydration, elimination, sleep and activity  6. If unable to ensure safety without constant attention obtain sitter and review sitter guidelines with assigned personnel  7. Initiate Psychosocial CNS and Spiritual Care consult, as indicated  4/4/2025 2334 by Ching Posey, RN  Outcome: Progressing  4/4/2025 1149 by Venita Uribe, RN  Outcome: Progressing

## 2025-04-05 NOTE — DISCHARGE SUMMARY
[facility]  Office: [phone]  Ulises Yoo DO, Glenn Patterson DO, Fernando Wilson DO, Reji Gagnon DO, Radha Grayson MD, Shanell Denney MD, Juanis Zaragoza MD, Carmela Jennings MD,  Kali Harp MD, Mitra Willson MD, Alex Adair MD,  aPmela Hodge DO, Ar Pizano MD, Jaylen Sepulveda MD, Raoul Yoo DO, Katina Deras MD,  Lawrence Davila DO, Belinda Whalen MD, Crystal Rai MD, Lance Spence MD,  Wilson Granger MD, Dom Rogers MD, Bhavana Wyatt MD, Kylah Richmond MD, Humphrey Ureña MD, Guille Garcia MD, Pedro Hernandez DO, Yasir Parker MD, Pamela Patterson MD, Mohsin Reza, MD, Carlie Cohen MD, Shirley Waterhouse, CNP,  Sridevi Muñoz CNP, Pedro Kuhn, CNP,  Maria G Norman, [facility], Edelmira Oviedo, CNP, Niki Hernandez, CNP, Penelope Reyes, CNP, Shiela Oropeza, CNP, Doris Tolliver, PA-C, Екатерина Thomas, CNP, Nena Baltazar, CNP,  Iliana Rivero, CNP, Simona Foley, CNP, Ramin Villegas, PA-C, Jimena Castillo, CNP,  Veronica Hogue, CNS, Miladys Matamoros, CNP, Kirsty Blanco, CNP,   Rosaura Wolff, CNP         [facility]   IN-PATIENT SERVICE   [facility]    Discharge Summary     Patient ID: Aracelis Jimenez  :  1950   MRN: [id number]     ACCOUNT:  [id number]   Patient's PCP: Francisca Daniels MD  Admit Date: 3/28/2025   Discharge Date: 2025  Length of Stay: 8  Code Status:  Full Code  Admitting Physician: Radha Grayson MD  Discharge Physician: Raoul T Retholtz, DO     Active Discharge Diagnoses:     Hospital Problem Lists:  Principal Problem:    Respiratory failure requiring intubation  Active Problems:    Acute kidney injury    Primary hypertension    Type 2 diabetes mellitus with hyperglycemia, with long-term current use of insulin (HCC)    Hypothyroidism    CKD stage 3 due to type 2 diabetes mellitus (HCC)    Bradycardia    Seizure-like activity (HCC)    Hyperkalemia    Action tremor    Toxic metabolic encephalopathy  Resolved Problems:    * No

## 2025-04-05 NOTE — PROGRESS NOTES
NEUROLOGY INPATIENT PROGRESS NOTE    4/5/2025         Aracelis Jimenez is a  74 y.o. female admitted on 3/28/2025 with  Hyperkalemia [E87.5]  Bradycardia [R00.1]  Acute kidney injury [N17.9]  Respiratory failure requiring intubation [J96.90]    Reason for consult: Acute metabolic encephalopathy and not following commands off sedation on ventilator   History is obtained mostly from the medical record and from patient's daughter at bedside and the caregivers. Chart is reviewed and patient is examined.   Briefly, this is a  74 y.o. female with hx of HTN, diabetes, CKD and prior cva and hx of tremors was admitted on 3/28/2025 with bradycardia and altered mentation.  Patient was sitting on the toilet and having difficulty having a bowel movement.  As per patient's family; her antihypertensive medications were recently adjusted and then onwards patient was feeling tired and lightheaded over the past 2 weeks.   On arrival by EMS, patient was found to be bradycardic and hypotensive.  Patient was given atropine without improvement; then she was given Versed and transcutaneous pacing was started.  On arrival to ER, patient's mentation decreased and patient is unable to protect her airway therefore was intubated.  Patient has been on Versed drip.  This early morning patient has seizure-like activity lasting for about 1 minute.  Caregivers witnessed that episode and described it as shaking/tremoring of both upper extremities with eyes staring and fixed for \"less than 1 minute\".  Vitals at that time were 155/85; pulse 101/min.  Patient was on Versed drip.  Patient does not have any history of seizures.  Of note; patient was following up at [facility] neurology; patient has several year hx of tremors involving bilateral upper extremities for which she was on primidone and it was discontinued as it was ineffective and caused patient to be drowsy. Patient's dad also has hx of tremors.  Patient was on gabapentin \"for a while\" to help  tablet by mouth daily      aspirin 81 MG chewable tablet Take 1 tablet by mouth daily 30 tablet 3    Elastic Bandages & Supports (JOBST KNEE HIGH COMPRESSION SM) MISC 1 each by Does not apply route daily as needed (prologed standing) 1 each 0     Allergies: Aracelis Jimenez is allergic to morphine, metformin, sulfa antibiotics, and adhesive tape.    Past Medical History:   Diagnosis Date    Depression     Diabetes mellitus (HCC)     Hypertension     Stroke (HCC)     Tremors of nervous system 2020    being evaluated for parkinsons       Past Surgical History:   Procedure Laterality Date    CHOLECYSTECTOMY      HYSTERECTOMY (CERVIX STATUS UNKNOWN)      RIB FRACTURE SURGERY  2022    [facility]     Social History: Aracelis Jimenez  reports that she has never smoked. She has never used smokeless tobacco. She reports that she does not drink alcohol and does not use drugs.    Family History   Problem Relation Age of Onset    Kidney Disease Mother         only had one kidney       Current Medications:     metoprolol succinate  50 mg Oral Daily    topiramate  25 mg Oral BID    losartan  50 mg Oral Daily    traZODone  200 mg Oral Nightly    clonazePAM  0.25 mg Oral Nightly    lansoprazole  30 mg Oral QAM AC    levothyroxine  75 mcg Oral Daily    atorvastatin  40 mg Oral Daily    insulin lispro  0-8 Units SubCUTAneous 4x Daily AC & HS    [Held by provider] insulin glargine  14 Units SubCUTAneous Nightly    sodium chloride flush  5-40 mL IntraVENous 2 times per day    heparin (porcine)  5,000 Units SubCUTAneous 3 times per day    sodium chloride flush  5-40 mL IntraVENous 2 times per day     PRN Meds include: potassium chloride **OR** potassium alternative oral replacement **OR** potassium chloride, aluminum & magnesium hydroxide-simethicone, albuterol, magnesium sulfate, sennosides-docusate sodium, hydrALAZINE, glucose, dextrose bolus **OR** dextrose bolus, glucagon (rDNA), dextrose, sodium chloride flush, sodium chloride, acetaminophen

## 2025-04-05 NOTE — PROGRESS NOTES
[facility]   Progress Note                   Date:   4/5/2025  Patient name: Aracelis Jimenez  Date of admission:  3/28/2025  2:47 AM  MRN:   [id number]  YOB: 1950  PCP: Francisca Daniels MD    Reason for Admission: Hyperkalemia [E87.5]  Bradycardia [R00.1]  Acute kidney injury [N17.9]  Respiratory failure requiring intubation [J96.90]    Subjective:       Clinical Changes / Abnormalities: Pt seen and examined in the room.  Patient resting in bed  Labs, vitals and tele reviewed- NSR 90's       Medications:   Scheduled Meds:   metoprolol succinate  50 mg Oral Daily    topiramate  25 mg Oral BID    losartan  50 mg Oral Daily    traZODone  200 mg Oral Nightly    clonazePAM  0.25 mg Oral Nightly    lansoprazole  30 mg Oral QAM AC    levothyroxine  75 mcg Oral Daily    atorvastatin  40 mg Oral Daily    insulin lispro  0-8 Units SubCUTAneous 4x Daily AC & HS    [Held by provider] insulin glargine  14 Units SubCUTAneous Nightly    sodium chloride flush  5-40 mL IntraVENous 2 times per day    heparin (porcine)  5,000 Units SubCUTAneous 3 times per day    sodium chloride flush  5-40 mL IntraVENous 2 times per day     Continuous Infusions:   dextrose      sodium chloride       CBC:   Recent Labs     04/03/25  0554 04/04/25  0312 04/05/25  0305   WBC 7.6 6.9 8.4   HGB 11.1* 11.0* 10.6*    167 162     BMP:    Recent Labs     04/03/25  0554 04/04/25  0312 04/05/25  0305    139 138   K 3.6* 3.8 3.5*    102 101   CO2 22 22 26   BUN 17 23 28*   CREATININE 1.1* 1.3* 1.8*   GLUCOSE 203* 263* 215*     Hepatic:   No results for input(s): \"AST\", \"ALT\", \"BILITOT\", \"ALKPHOS\" in the last 72 hours.    Invalid input(s): \"ALB\"    Troponin: No results for input(s): \"TROPHS\" in the last 72 hours.  BNP: No results for input(s): \"BNP\" in the last 72 hours.  Lipids: No results for input(s): \"CHOL\", \"HDL\" in the last 72 hours.    Invalid input(s): \"LDLCALCU\"  INR:   No results for input(s): \"INR\" in the  intubation (HCC)     Acute kidney injury     Symptomatic bradycardia      Plan of Treatment:   HR SR/mild ST. Continue BB   No indications for permanent pacing at this time.   IV antibiotics for PNA per primary   Hypothyroidism-currently uncontrolled which is also contributing to arrhythmias. Per primary   Replace k.  Keep K> 4 and MAG > 2   Ok with d/c to SNF today. Will follow up as outpt in clinic.      Electronically signed by DARYL CRAFT CNP on 4/5/2025 at 8:17 AM  [facility]  [phone]

## 2025-04-05 NOTE — PROGRESS NOTES
Pulmonary Critical Care Progress Note    Patient seen for the follow up of Respiratory failure requiring intubation     Subjective:    She weaned down oxygen to 2 L..  She is improved shortness of breath now much more awake baseline mental status.  Poor appetiteHeart rate improved.  No chest pain.    Examination:    Vitals: BP (!) 152/56   Pulse 84   Temp 97.8 °F (36.6 °C) (Temporal)   Resp 13   Ht 1.575 m (5' 2\")   Wt 86.8 kg (191 lb 6.4 oz)   SpO2 96%   BMI 35.01 kg/m²   SpO2  Av.8 %  Min: 95 %  Max: 98 %  General appearance: Awake in no acute distress  Neck: No JVD  Lungs: Decreased breath sound no crackles or wheezing  Heart: regular rate and rhythm, S1, S2 normal, no gallop  Abdomen: Soft, non tender, + BS  Extremities: no cyanosis or clubbing. No significant edema    LABs:    CBC:   Recent Labs     25  0312 25  0305   WBC 6.9 8.4   HGB 11.0* 10.6*   HCT 33.8* 32.6*    162     BMP:   Recent Labs     25  0312 25  0305    138   K 3.8 3.5*   CO2 22 26   BUN 23 28*   CREATININE 1.3* 1.8*   LABGLOM 44* 30*   GLUCOSE 263* 215*      Latest Reference Range & Units 25 08:27   POC HCO3 21.0 - 28.0 mmol/L 21.5   POC O2 SAT 94.0 - 98.0 % 92.9 (L)   POC pCO2 35.0 - 48.0 mm Hg 42.9   POC pH 7.350 - 7.450  7.308 (L)   POC PO2 83.0 - 108.0 mm Hg 73.0 (L)   (L): Data is abnormally low   Latest Reference Range & Units 25 05:45 25 08:51 25 03:37   Procalcitonin 0.00 - 0.09 ng/mL 0.58 (H) 0.32 (H) 0.25 (H)   (H): Data is abnormally high    Radiology:    Chest x-ray   Slightly decreased perihilar opacities. Otherwise no significant change.       Chest x-ray 4/3 reviewed  Perihilar infiltrates that are improved compared to prior.     Trace effusions.    Chest x-ray     Removal of ET tube and enteric tube.     Perihilar airspace disease suggesting central pulmonary edema. Small right  pleural effusion.         Chest x-ray 3/31 reviewed  1. Endotracheal

## 2025-04-05 NOTE — CARE COORDINATION
Social work: ambulance time moved up faxed mya and hens to both building [facility] and main admissions fax also.  Report [phone]  Fax [phone] floor fax [phone]  Maria G hall

## 2025-04-05 NOTE — PROGRESS NOTES
End Of Shift Note  [facility]    Summary of shift:Patient had an uneventful night. She remained hemodynamically stable. Discharging today pickup set for 2pm to [facility].  Vitals:    Vitals:    04/04/25 2028 04/05/25 0000 04/05/25 0400 04/05/25 0433   BP: (!) 155/79 (!) 145/53     Pulse:       Resp: 20 16 18    Temp: 98.3 °F (36.8 °C) 97.1 °F (36.2 °C) 97.7 °F (36.5 °C)    TempSrc: Oral Temporal Oral    SpO2:    98%   Weight:   86.8 kg (191 lb 6.4 oz)    Height:            I&O:   Intake/Output Summary (Last 24 hours) at 4/5/2025 0603  Last data filed at 4/5/2025 0400  Gross per 24 hour   Intake --   Output 350 ml   Net -350 ml       Resp Status: 2L    Ventilator Settings:  Vent Mode: CPAP/PS Resp Rate (Set): 12 bpm/Vt (Set, mL): 550 mL/ /FiO2 : 100 %    Critical Care IV infusions:   dextrose      sodium chloride          LDA:   Peripheral IV 04/01/25 Distal;Left;Ventral Cephalic (Active)   Number of days: 3

## 2025-04-05 NOTE — PROGRESS NOTES
Rn attempted to call report to [facility], was able to leave voicemail, Pt discharged via wheelchair, IV removed. Tele removed. Pt discharged reviewed with patient daughter per request. All personal belongings sent with patient.

## 2025-04-07 ENCOUNTER — HOSPITAL ENCOUNTER (OUTPATIENT)
Age: 75
Setting detail: SPECIMEN
Discharge: HOME | End: 2025-04-07

## 2025-04-07 LAB
ALBUMIN SERPL-MCNC: 3.1 G/DL (ref 3.5–5.2)
ALBUMIN/GLOB SERPL: 1 {RATIO} (ref 1–2.5)
ALP SERPL-CCNC: 66 U/L (ref 35–104)
ALT SERPL-CCNC: 13 U/L (ref 10–35)
ANION GAP SERPL CALCULATED.3IONS-SCNC: 12 MMOL/L (ref 9–16)
AST SERPL-CCNC: 21 U/L (ref 10–35)
BASOPHILS # BLD: 0.07 K/UL (ref 0–0.2)
BASOPHILS NFR BLD: 1 % (ref 0–2)
BILIRUB SERPL-MCNC: 0.4 MG/DL (ref 0–1.2)
BUN SERPL-MCNC: 26 MG/DL (ref 8–23)
CALCIUM SERPL-MCNC: 8.8 MG/DL (ref 8.8–10.2)
CHLORIDE SERPL-SCNC: 104 MMOL/L (ref 98–107)
CO2 SERPL-SCNC: 25 MMOL/L (ref 20–31)
CREAT SERPL-MCNC: 1.5 MG/DL (ref 0.5–0.9)
EOSINOPHIL # BLD: 0.25 K/UL (ref 0–0.44)
EOSINOPHILS RELATIVE PERCENT: 3 % (ref 1–4)
ERYTHROCYTE [DISTWIDTH] IN BLOOD BY AUTOMATED COUNT: 13.5 % (ref 11.8–14.4)
EST. AVERAGE GLUCOSE BLD GHB EST-MCNC: 203 MG/DL
GFR, ESTIMATED: 36 ML/MIN/1.73M2
GLUCOSE SERPL-MCNC: 327 MG/DL (ref 82–115)
HBA1C MFR BLD: 8.7 % (ref 4–6)
HCT VFR BLD AUTO: 33.7 % (ref 36.3–47.1)
HGB BLD-MCNC: 10.9 G/DL (ref 11.9–15.1)
IMM GRANULOCYTES # BLD AUTO: 0.17 K/UL (ref 0–0.3)
IMM GRANULOCYTES NFR BLD: 2 %
LYMPHOCYTES NFR BLD: 2.41 K/UL (ref 1.1–3.7)
LYMPHOCYTES RELATIVE PERCENT: 32 % (ref 24–43)
MCH RBC QN AUTO: 31.1 PG (ref 25.2–33.5)
MCHC RBC AUTO-ENTMCNC: 32.3 G/DL (ref 28.4–34.8)
MCV RBC AUTO: 96 FL (ref 82.6–102.9)
MONOCYTES NFR BLD: 0.58 K/UL (ref 0.1–1.2)
MONOCYTES NFR BLD: 8 % (ref 3–12)
NEUTROPHILS NFR BLD: 54 % (ref 36–65)
NEUTS SEG NFR BLD: 4.14 K/UL (ref 1.5–8.1)
NRBC BLD-RTO: 0 PER 100 WBC
PLATELET # BLD AUTO: 183 K/UL (ref 138–453)
PMV BLD AUTO: 9.8 FL (ref 8.1–13.5)
POTASSIUM SERPL-SCNC: 4.1 MMOL/L (ref 3.7–5.3)
PROT SERPL-MCNC: 6.2 G/DL (ref 6.6–8.7)
RBC # BLD AUTO: 3.51 M/UL (ref 3.95–5.11)
SODIUM SERPL-SCNC: 140 MMOL/L (ref 136–145)
WBC OTHER # BLD: 7.6 K/UL (ref 3.5–11.3)

## 2025-04-07 PROCEDURE — 80053 COMPREHEN METABOLIC PANEL: CPT

## 2025-04-07 PROCEDURE — 83036 HEMOGLOBIN GLYCOSYLATED A1C: CPT

## 2025-04-07 PROCEDURE — 85025 COMPLETE CBC W/AUTO DIFF WBC: CPT

## 2025-04-07 PROCEDURE — 36415 COLL VENOUS BLD VENIPUNCTURE: CPT

## 2025-04-14 NOTE — PROGRESS NOTES
Physician Progress Note      PATIENT:               LADARIUS HORTON  CSN #:                  [id number]  :                       1950  ADMIT DATE:       3/28/2025 2:47 AM  DISCH DATE:        2025 9:54 AM  RESPONDING  PROVIDER #:        Alex Adair MD          QUERY TEXT:    The attending physician is required to clarify conflicting documentation in   the medical record.  Noted documentation of \"bradycardia is due to adverse   reaction of increased metoprolol dosing\" per  query response by Dr. Adair   and \"bradycardia was felt to be metabolic in nature\" per d/c summary. Per   query response on 4/10 due to conflicting documentation, Dr. Yoo response   was \"Ask CV or Dr. Adair, I did not see the patient. I did the DC summary.\"    The clinical indicators include:  *Per query response on  by Dr. Adair, \"Bradycardia is due to adverse   reaction of increased metoprolol dosing.\"  *Per d/c summary, \"The patient's bradycardia was felt to be metabolic in   nature and no role for pacemaker was indicated\"  -- Conflicting documentation noted due to statements above- query sent to Dr. Yoo who responded on 4/10 as \"  *Per 3/28 cardiology consult note, \"Cardiology was consulted for junctional   bradycardia ,patient seen and examined with family at the bedside per family   her    PCP had increased her Lopressor to 100 mg daily from 25 mg p.o. twice   daily\"  *Per EP consult, \"Junctional bradycardia, now resolved, due to DKA with   acidosis, hyperkalemia and recent increase in metoprolol dose.\"  *Per  IM note, \"Severe vagal episode causing symptomatic bradycardia with   complication of prior beta-blocker usage  *Per  Pulm note, \" Bradycardia ?  Related to beta-blocker/sick sinus   syndrome status post transvenous pacemaker/pulmonary edema\"    -----  EKG 3/28  *** Critical Test Result: Low HR  Junctional bradycardia  Possible Inferior infarct , age undetermined  Abnormal ECG  When compared

## 2025-05-07 ENCOUNTER — OFFICE VISIT (OUTPATIENT)
Dept: NEUROLOGY | Age: 75
End: 2025-05-07
Payer: MEDICARE

## 2025-05-07 VITALS
WEIGHT: 184 LBS | BODY MASS INDEX: 33.86 KG/M2 | HEART RATE: 63 BPM | HEIGHT: 62 IN | SYSTOLIC BLOOD PRESSURE: 163 MMHG | DIASTOLIC BLOOD PRESSURE: 63 MMHG

## 2025-05-07 DIAGNOSIS — G25.2 INTENTION TREMOR: Primary | ICD-10-CM

## 2025-05-07 DIAGNOSIS — Z86.73 HISTORY OF STROKE: ICD-10-CM

## 2025-05-07 DIAGNOSIS — R41.82 ALTERED MENTAL STATUS, UNSPECIFIED ALTERED MENTAL STATUS TYPE: ICD-10-CM

## 2025-05-07 PROCEDURE — 3078F DIAST BP <80 MM HG: CPT | Performed by: PHYSICIAN ASSISTANT

## 2025-05-07 PROCEDURE — 1160F RVW MEDS BY RX/DR IN RCRD: CPT | Performed by: PHYSICIAN ASSISTANT

## 2025-05-07 PROCEDURE — 1159F MED LIST DOCD IN RCRD: CPT | Performed by: PHYSICIAN ASSISTANT

## 2025-05-07 PROCEDURE — 99204 OFFICE O/P NEW MOD 45 MIN: CPT | Performed by: PHYSICIAN ASSISTANT

## 2025-05-07 PROCEDURE — 1123F ACP DISCUSS/DSCN MKR DOCD: CPT | Performed by: PHYSICIAN ASSISTANT

## 2025-05-07 PROCEDURE — 3077F SYST BP >= 140 MM HG: CPT | Performed by: PHYSICIAN ASSISTANT

## 2025-05-07 RX ORDER — TOPIRAMATE 25 MG/1
25 TABLET, FILM COATED ORAL 2 TIMES DAILY
COMMUNITY
End: 2025-05-07 | Stop reason: SDUPTHER

## 2025-05-07 RX ORDER — OMEPRAZOLE 20 MG/1
20 CAPSULE, DELAYED RELEASE ORAL DAILY
COMMUNITY

## 2025-05-07 RX ORDER — TOPIRAMATE 25 MG/1
25 TABLET, FILM COATED ORAL 2 TIMES DAILY
Qty: 180 TABLET | Refills: 1 | Status: SHIPPED | OUTPATIENT
Start: 2025-05-07

## 2025-05-07 NOTE — PROGRESS NOTES
[address]  [facility] [address]  Dept: [phone]    PATIENT NAME: Aracelis Jimenez  PATIENT MRN: [id number]  PRIMARY CARE PHYSICIAN: Francisca Daniels MD    HPI:      Aracelis Jimenez is a 75 y.o. female who presents to clinic today for hospitalization follow up regarding altered mental status. She has a history of CVA and tremors for which she followed with [facility].      She travelled to [facility] due to witnessed altered mental status -  onset while sitting on the toilet for a difficult bowel moveement. Found to be significantly hypotensive, bradycardia requiting transcutaneous pacing.  Her antihypertensive medications had been adjusted in the weeks prior to onset. While in the hospital and intubated, family witnessed 1 minute episode of upper ext tremors with associated staring. Subsequent EEG showed no epileptogenic findings.    She was started on topiramate 25 mg BID for tremor with excellent result- well tolerated and tremor is essentially eliminated. Tremor was first noted around 2020 following stroke. It is present in bilateral hands, not head. Noted in hands at rest and when holding objects, outstretched hands. Her father had resting head tremor.     She has been discharged from rehab (April 24) and her cognitive issues have vastly improved \"I couldn't count to 20, now I can.\" She does not note major persisting memory issues.     There have been no witnessed episodes concerning for seizure- no tongue-biting, uncontrolled extremity movements, weakness, concussion, staring episodes, abrupt complete incontinence.     She was discharged on trazodone, clonazepam for sleep and continues to experience frequent awakenings. Of note she had taken tylenol PM for years as a sleep aid and no longer uses this.    No falls.     Routine EEG April 2025: EEG demonstrated moderate diffuse slowing but no ongoing seizure activity     MRI brain April 2025: Subtle punctate foci of acute infarction

## 2025-08-06 ENCOUNTER — OFFICE VISIT (OUTPATIENT)
Dept: NEUROLOGY | Age: 75
End: 2025-08-06
Payer: MEDICARE

## 2025-08-06 VITALS
WEIGHT: 185.4 LBS | SYSTOLIC BLOOD PRESSURE: 196 MMHG | BODY MASS INDEX: 34.12 KG/M2 | HEIGHT: 62 IN | DIASTOLIC BLOOD PRESSURE: 73 MMHG | HEART RATE: 46 BPM

## 2025-08-06 DIAGNOSIS — Z86.73 HISTORY OF STROKE: ICD-10-CM

## 2025-08-06 DIAGNOSIS — G25.2 INTENTION TREMOR: Primary | ICD-10-CM

## 2025-08-06 PROCEDURE — 3077F SYST BP >= 140 MM HG: CPT | Performed by: PHYSICIAN ASSISTANT

## 2025-08-06 PROCEDURE — 1159F MED LIST DOCD IN RCRD: CPT | Performed by: PHYSICIAN ASSISTANT

## 2025-08-06 PROCEDURE — 3078F DIAST BP <80 MM HG: CPT | Performed by: PHYSICIAN ASSISTANT

## 2025-08-06 PROCEDURE — 99214 OFFICE O/P EST MOD 30 MIN: CPT | Performed by: PHYSICIAN ASSISTANT

## 2025-08-06 PROCEDURE — 1123F ACP DISCUSS/DSCN MKR DOCD: CPT | Performed by: PHYSICIAN ASSISTANT

## 2025-08-06 PROCEDURE — 1160F RVW MEDS BY RX/DR IN RCRD: CPT | Performed by: PHYSICIAN ASSISTANT

## 2025-08-06 RX ORDER — PANTOPRAZOLE SODIUM 40 MG/1
40 TABLET, DELAYED RELEASE ORAL DAILY
COMMUNITY
Start: 2025-07-28

## 2025-08-06 RX ORDER — TRAZODONE HYDROCHLORIDE 150 MG/1
150 TABLET ORAL NIGHTLY
COMMUNITY
Start: 2025-07-14

## 2025-08-06 RX ORDER — OXYBUTYNIN CHLORIDE 15 MG/1
15 TABLET, EXTENDED RELEASE ORAL DAILY
COMMUNITY
Start: 2025-07-25

## 2025-08-06 RX ORDER — ESCITALOPRAM OXALATE 10 MG/1
10 TABLET ORAL DAILY
COMMUNITY
Start: 2025-07-25

## 2025-08-06 RX ORDER — METOPROLOL TARTRATE 50 MG
50 TABLET ORAL NIGHTLY
COMMUNITY